# Patient Record
Sex: MALE | Race: WHITE | HISPANIC OR LATINO | Employment: OTHER | URBAN - METROPOLITAN AREA
[De-identification: names, ages, dates, MRNs, and addresses within clinical notes are randomized per-mention and may not be internally consistent; named-entity substitution may affect disease eponyms.]

---

## 2019-11-15 DIAGNOSIS — I63.50 CEREBRAL ARTERY OCCLUSION WITH CEREBRAL INFARCTION: ICD-10-CM

## 2019-11-15 DIAGNOSIS — D68.52 HETEROZYGOUS FOR PROTHROMBIN G20210A MUTATION: ICD-10-CM

## 2019-11-15 DIAGNOSIS — R13.19 OTHER DYSPHAGIA: Primary | ICD-10-CM

## 2019-11-15 DIAGNOSIS — R13.19 CONSTANT LOW-GRADE DYSPHAGIA: Primary | ICD-10-CM

## 2019-12-11 ENCOUNTER — HOSPITAL ENCOUNTER (OUTPATIENT)
Dept: RADIOLOGY | Facility: HOSPITAL | Age: 69
Discharge: HOME | End: 2019-12-11
Attending: NURSE PRACTITIONER
Payer: MEDICARE

## 2019-12-11 DIAGNOSIS — I63.50 CEREBRAL ARTERY OCCLUSION WITH CEREBRAL INFARCTION: ICD-10-CM

## 2019-12-11 DIAGNOSIS — R13.19 CONSTANT LOW-GRADE DYSPHAGIA: ICD-10-CM

## 2019-12-11 PROCEDURE — 74230 X-RAY XM SWLNG FUNCJ C+: CPT | Mod: TC

## 2019-12-12 ENCOUNTER — HOSPITAL ENCOUNTER (EMERGENCY)
Facility: HOSPITAL | Age: 69
Discharge: NURSING, REHAB OR LONG-TERM CARE | End: 2019-12-13
Attending: EMERGENCY MEDICINE
Payer: MEDICARE

## 2019-12-12 DIAGNOSIS — S09.90XA INJURY OF HEAD, INITIAL ENCOUNTER: ICD-10-CM

## 2019-12-12 DIAGNOSIS — S00.83XA CONTUSION OF FOREHEAD, INITIAL ENCOUNTER: Primary | ICD-10-CM

## 2019-12-12 PROCEDURE — 99284 EMERGENCY DEPT VISIT MOD MDM: CPT

## 2019-12-13 VITALS
OXYGEN SATURATION: 88 % | WEIGHT: 150 LBS | DIASTOLIC BLOOD PRESSURE: 75 MMHG | RESPIRATION RATE: 20 BRPM | TEMPERATURE: 98 F | HEART RATE: 97 BPM | HEIGHT: 60 IN | SYSTOLIC BLOOD PRESSURE: 173 MMHG | BODY MASS INDEX: 29.45 KG/M2

## 2019-12-13 NOTE — ED PROVIDER NOTES
Encounter Date: 12/12/2019       History     Chief Complaint   Patient presents with    Head Injury     Chief complaint is fall HPI this patient was noted to have fallen at the nursing home.  Does have a history of stroke.  He is basically nonverbal.  He moves his legs and his arms fairly well. He has had a history of stroke and hypertension.  He is at his baseline per EMS report.  Blood sugar normal. Patient with minor superficial laceration right eyebrow.  No obvious complaints of pain on examination and palpation.  He cannot answer questions.        Review of patient's allergies indicates:  No Known Allergies  No past medical history on file.  No past surgical history on file.  No family history on file.  Social History     Tobacco Use    Smoking status: Not on file   Substance Use Topics    Alcohol use: Not on file    Drug use: Not on file     Review of Systems   Unable to perform ROS: Patient nonverbal       Physical Exam     Initial Vitals [12/12/19 2244]   BP Pulse Resp Temp SpO2   126/62 70 20 98.2 °F (36.8 °C) 97 %      MAP       --         Physical Exam    Nursing note and vitals reviewed.  Constitutional: He appears well-developed and well-nourished.   Small superficial laceration right eyebrow   HENT:   Head: Normocephalic and atraumatic.   Eyes: Conjunctivae, EOM and lids are normal. Pupils are equal, round, and reactive to light.   Neck: Trachea normal. Neck supple. No thyroid mass present.   Cardiovascular: Normal rate, regular rhythm and normal heart sounds.   Pulmonary/Chest: Breath sounds normal. No respiratory distress.   Abdominal: Soft. Bowel sounds are normal. There is no tenderness.   Musculoskeletal:   Patient does move both lower extremities. He does have  in both arms.   Neurological: He is alert. He has normal strength. No cranial nerve deficit or sensory deficit.   Patient unable answer questions.   Skin: Skin is warm and dry.   Psychiatric: He has a normal mood and affect. His  speech is normal and behavior is normal. Judgment and thought content normal.         ED Course   Procedures  Labs Reviewed - No data to display       Imaging Results    None                       Attending Attestation:             Attending ED Notes:   CT scans negative will discharge home no laceration repair needed.                        Clinical Impression:       ICD-10-CM ICD-9-CM   1. Contusion of forehead, initial encounter S00.83XA 920   2. Injury of head, initial encounter S09.90XA 959.01                             Mynor Hanna MD  12/13/19 0404

## 2019-12-13 NOTE — ED NOTES
CLEANED SMALL LACERATION ABOVE RIGHT EYE IN BROW LINE WITH NS.  APPLIED BACITRACIN OINTMENT AND BAND-AID.  PT TOLERATED WELL.

## 2020-03-19 ENCOUNTER — HOSPITAL ENCOUNTER (INPATIENT)
Facility: HOSPITAL | Age: 70
LOS: 18 days | Discharge: HOSPICE | DRG: 208 | End: 2020-04-07
Attending: EMERGENCY MEDICINE | Admitting: INTERNAL MEDICINE
Payer: MEDICARE

## 2020-03-19 DIAGNOSIS — R00.0 SINUS TACHYCARDIA: ICD-10-CM

## 2020-03-19 DIAGNOSIS — R06.02 SHORTNESS OF BREATH: ICD-10-CM

## 2020-03-19 DIAGNOSIS — J96.01 ACUTE HYPOXEMIC RESPIRATORY FAILURE: ICD-10-CM

## 2020-03-19 DIAGNOSIS — J96.91 RESPIRATORY FAILURE WITH HYPOXIA: ICD-10-CM

## 2020-03-19 DIAGNOSIS — R94.31 ABNORMAL EKG: ICD-10-CM

## 2020-03-19 DIAGNOSIS — Z20.822 SUSPECTED COVID-19 VIRUS INFECTION: ICD-10-CM

## 2020-03-19 DIAGNOSIS — J69.0 ASPIRATION PNEUMONIA OF RIGHT UPPER LOBE, UNSPECIFIED ASPIRATION PNEUMONIA TYPE: ICD-10-CM

## 2020-03-19 DIAGNOSIS — J18.9 PNEUMONIA OF BOTH LUNGS DUE TO INFECTIOUS ORGANISM: ICD-10-CM

## 2020-03-19 DIAGNOSIS — J18.9 PNEUMONIA OF BOTH LUNGS DUE TO INFECTIOUS ORGANISM, UNSPECIFIED PART OF LUNG: Primary | ICD-10-CM

## 2020-03-19 PROBLEM — E11.9 TYPE 2 DIABETES MELLITUS: Status: ACTIVE | Noted: 2020-03-19

## 2020-03-19 PROBLEM — R50.9 FEVER: Status: ACTIVE | Noted: 2020-03-19

## 2020-03-19 PROBLEM — R06.82 TACHYPNEA: Status: ACTIVE | Noted: 2020-03-19

## 2020-03-19 PROBLEM — I10 HTN (HYPERTENSION): Status: ACTIVE | Noted: 2020-03-19

## 2020-03-19 LAB
ALBUMIN SERPL BCP-MCNC: 3.9 G/DL (ref 3.5–5.2)
ALLENS TEST: ABNORMAL
ALP SERPL-CCNC: 70 U/L (ref 55–135)
ALT SERPL W/O P-5'-P-CCNC: 16 U/L (ref 10–44)
ANION GAP SERPL CALC-SCNC: 11 MMOL/L (ref 8–16)
APTT PPP: 33.1 SEC (ref 23.6–33.3)
AST SERPL-CCNC: 24 U/L (ref 10–40)
BASOPHILS # BLD AUTO: 0 K/UL (ref 0–0.2)
BASOPHILS NFR BLD: 0 % (ref 0–1.9)
BILIRUB SERPL-MCNC: 0.6 MG/DL (ref 0.1–1)
BILIRUB UR QL STRIP: NEGATIVE
BNP SERPL-MCNC: 13 PG/ML (ref 0–99)
BUN SERPL-MCNC: 22 MG/DL (ref 8–23)
CALCIUM SERPL-MCNC: 9.1 MG/DL (ref 8.7–10.5)
CHLORIDE SERPL-SCNC: 104 MMOL/L (ref 95–110)
CLARITY UR: CLEAR
CO2 SERPL-SCNC: 24 MMOL/L (ref 23–29)
COLOR UR: YELLOW
CREAT SERPL-MCNC: 0.7 MG/DL (ref 0.5–1.4)
DELSYS: ABNORMAL
DEPRECATED S PYO AG THROAT QL EIA: NEGATIVE
DIFFERENTIAL METHOD: ABNORMAL
EOSINOPHIL # BLD AUTO: 0 K/UL (ref 0–0.5)
EOSINOPHIL NFR BLD: 0 % (ref 0–8)
ERYTHROCYTE [DISTWIDTH] IN BLOOD BY AUTOMATED COUNT: 12.2 % (ref 11.5–14.5)
EST. GFR  (AFRICAN AMERICAN): >60 ML/MIN/1.73 M^2
EST. GFR  (NON AFRICAN AMERICAN): >60 ML/MIN/1.73 M^2
FLOW: 3
GLUCOSE SERPL-MCNC: 105 MG/DL (ref 70–110)
GLUCOSE SERPL-MCNC: 95 MG/DL (ref 70–110)
GLUCOSE UR QL STRIP: NEGATIVE
HCO3 UR-SCNC: 21.2 MMOL/L (ref 24–28)
HCT VFR BLD AUTO: 34.9 % (ref 40–54)
HCT VFR BLD CALC: 33 %PCV (ref 36–54)
HGB BLD-MCNC: 11.7 G/DL (ref 14–18)
HGB UR QL STRIP: NEGATIVE
IMM GRANULOCYTES # BLD AUTO: 0.01 K/UL (ref 0–0.04)
IMM GRANULOCYTES NFR BLD AUTO: 0.2 % (ref 0–0.5)
INFLUENZA A, MOLECULAR: NEGATIVE
INFLUENZA B, MOLECULAR: NEGATIVE
INR PPP: 1.3
KETONES UR QL STRIP: ABNORMAL
LACTATE SERPL-SCNC: 1.1 MMOL/L (ref 0.5–1.9)
LEUKOCYTE ESTERASE UR QL STRIP: NEGATIVE
LIPASE SERPL-CCNC: 35 U/L (ref 4–60)
LYMPHOCYTES # BLD AUTO: 1.3 K/UL (ref 1–4.8)
LYMPHOCYTES NFR BLD: 27.1 % (ref 18–48)
MAGNESIUM SERPL-MCNC: 2.1 MG/DL (ref 1.6–2.6)
MCH RBC QN AUTO: 32.1 PG (ref 27–31)
MCHC RBC AUTO-ENTMCNC: 33.5 G/DL (ref 32–36)
MCV RBC AUTO: 96 FL (ref 82–98)
MODE: ABNORMAL
MONOCYTES # BLD AUTO: 0.3 K/UL (ref 0.3–1)
MONOCYTES NFR BLD: 7.1 % (ref 4–15)
NEUTROPHILS # BLD AUTO: 3 K/UL (ref 1.8–7.7)
NEUTROPHILS NFR BLD: 65.6 % (ref 38–73)
NITRITE UR QL STRIP: NEGATIVE
NRBC BLD-RTO: 0 /100 WBC
PCO2 BLDA: 32.7 MMHG (ref 35–45)
PH SMN: 7.42 [PH] (ref 7.35–7.45)
PH UR STRIP: 6 [PH] (ref 5–8)
PHOSPHATE SERPL-MCNC: 3.3 MG/DL (ref 2.7–4.5)
PLATELET # BLD AUTO: 153 K/UL (ref 150–350)
PMV BLD AUTO: 10.9 FL (ref 9.2–12.9)
PO2 BLDA: 87 MMHG (ref 80–100)
POC BE: -3 MMOL/L
POC IONIZED CALCIUM: 1.1 MMOL/L (ref 1.06–1.42)
POC SATURATED O2: 97 % (ref 95–100)
POC TCO2: 22 MMOL/L (ref 23–27)
POTASSIUM BLD-SCNC: 4 MMOL/L (ref 3.5–5.1)
POTASSIUM SERPL-SCNC: 4.1 MMOL/L (ref 3.5–5.1)
PROCALCITONIN SERPL IA-MCNC: <0.05 NG/ML (ref 0–0.5)
PROT SERPL-MCNC: 7.3 G/DL (ref 6–8.4)
PROT UR QL STRIP: ABNORMAL
PROTHROMBIN TIME: 16 SEC (ref 10.6–14.8)
RBC # BLD AUTO: 3.65 M/UL (ref 4.6–6.2)
SAMPLE: ABNORMAL
SITE: ABNORMAL
SODIUM BLD-SCNC: 142 MMOL/L (ref 136–145)
SODIUM SERPL-SCNC: 139 MMOL/L (ref 136–145)
SP GR UR STRIP: 1.03 (ref 1–1.03)
SPECIMEN SOURCE: NORMAL
TROPONIN I SERPL DL<=0.01 NG/ML-MCNC: <0.03 NG/ML
URN SPEC COLLECT METH UR: ABNORMAL
UROBILINOGEN UR STRIP-ACNC: NEGATIVE EU/DL
WBC # BLD AUTO: 4.62 K/UL (ref 3.9–12.7)

## 2020-03-19 PROCEDURE — 63600175 PHARM REV CODE 636 W HCPCS: Performed by: EMERGENCY MEDICINE

## 2020-03-19 PROCEDURE — 87502 INFLUENZA DNA AMP PROBE: CPT

## 2020-03-19 PROCEDURE — 85014 HEMATOCRIT: CPT

## 2020-03-19 PROCEDURE — 94761 N-INVAS EAR/PLS OXIMETRY MLT: CPT

## 2020-03-19 PROCEDURE — 25000003 PHARM REV CODE 250: Performed by: EMERGENCY MEDICINE

## 2020-03-19 PROCEDURE — 84145 PROCALCITONIN (PCT): CPT

## 2020-03-19 PROCEDURE — 87081 CULTURE SCREEN ONLY: CPT

## 2020-03-19 PROCEDURE — S0077 INJECTION, CLINDAMYCIN PHOSP: HCPCS | Performed by: EMERGENCY MEDICINE

## 2020-03-19 PROCEDURE — G0378 HOSPITAL OBSERVATION PER HR: HCPCS

## 2020-03-19 PROCEDURE — 96365 THER/PROPH/DIAG IV INF INIT: CPT | Mod: 59

## 2020-03-19 PROCEDURE — 81003 URINALYSIS AUTO W/O SCOPE: CPT

## 2020-03-19 PROCEDURE — 83735 ASSAY OF MAGNESIUM: CPT

## 2020-03-19 PROCEDURE — 36600 WITHDRAWAL OF ARTERIAL BLOOD: CPT

## 2020-03-19 PROCEDURE — 85730 THROMBOPLASTIN TIME PARTIAL: CPT

## 2020-03-19 PROCEDURE — 84132 ASSAY OF SERUM POTASSIUM: CPT

## 2020-03-19 PROCEDURE — 93005 ELECTROCARDIOGRAM TRACING: CPT | Performed by: INTERNAL MEDICINE

## 2020-03-19 PROCEDURE — 84295 ASSAY OF SERUM SODIUM: CPT

## 2020-03-19 PROCEDURE — 87880 STREP A ASSAY W/OPTIC: CPT

## 2020-03-19 PROCEDURE — 96375 TX/PRO/DX INJ NEW DRUG ADDON: CPT

## 2020-03-19 PROCEDURE — 85610 PROTHROMBIN TIME: CPT

## 2020-03-19 PROCEDURE — 83605 ASSAY OF LACTIC ACID: CPT

## 2020-03-19 PROCEDURE — 99900035 HC TECH TIME PER 15 MIN (STAT)

## 2020-03-19 PROCEDURE — 82330 ASSAY OF CALCIUM: CPT

## 2020-03-19 PROCEDURE — 96368 THER/DIAG CONCURRENT INF: CPT

## 2020-03-19 PROCEDURE — 87086 URINE CULTURE/COLONY COUNT: CPT

## 2020-03-19 PROCEDURE — 87040 BLOOD CULTURE FOR BACTERIA: CPT | Mod: 59

## 2020-03-19 PROCEDURE — 84100 ASSAY OF PHOSPHORUS: CPT

## 2020-03-19 PROCEDURE — 84484 ASSAY OF TROPONIN QUANT: CPT

## 2020-03-19 PROCEDURE — 83690 ASSAY OF LIPASE: CPT

## 2020-03-19 PROCEDURE — U0002 COVID-19 LAB TEST NON-CDC: HCPCS

## 2020-03-19 PROCEDURE — 83880 ASSAY OF NATRIURETIC PEPTIDE: CPT

## 2020-03-19 PROCEDURE — 96361 HYDRATE IV INFUSION ADD-ON: CPT

## 2020-03-19 PROCEDURE — 87641 MR-STAPH DNA AMP PROBE: CPT

## 2020-03-19 PROCEDURE — 82803 BLOOD GASES ANY COMBINATION: CPT

## 2020-03-19 PROCEDURE — 94640 AIRWAY INHALATION TREATMENT: CPT

## 2020-03-19 PROCEDURE — 85025 COMPLETE CBC W/AUTO DIFF WBC: CPT

## 2020-03-19 PROCEDURE — 27000221 HC OXYGEN, UP TO 24 HOURS

## 2020-03-19 PROCEDURE — 80053 COMPREHEN METABOLIC PANEL: CPT

## 2020-03-19 PROCEDURE — 51701 INSERT BLADDER CATHETER: CPT

## 2020-03-19 PROCEDURE — 99285 EMERGENCY DEPT VISIT HI MDM: CPT | Mod: 25

## 2020-03-19 PROCEDURE — 25000242 PHARM REV CODE 250 ALT 637 W/ HCPCS: Performed by: EMERGENCY MEDICINE

## 2020-03-19 RX ORDER — CHOLECALCIFEROL (VITAMIN D3) 25 MCG
1000 TABLET ORAL DAILY
COMMUNITY

## 2020-03-19 RX ORDER — MIRTAZAPINE 15 MG/1
15 TABLET, FILM COATED ORAL NIGHTLY
COMMUNITY

## 2020-03-19 RX ORDER — ASPIRIN 81 MG/1
81 TABLET ORAL DAILY
Status: ON HOLD | COMMUNITY
End: 2020-03-27 | Stop reason: HOSPADM

## 2020-03-19 RX ORDER — TRIAMCINOLONE ACETONIDE 0.25 MG/G
1 CREAM TOPICAL 2 TIMES DAILY
COMMUNITY

## 2020-03-19 RX ORDER — ACETAMINOPHEN 650 MG/1
650 SUPPOSITORY RECTAL
Status: COMPLETED | OUTPATIENT
Start: 2020-03-19 | End: 2020-03-19

## 2020-03-19 RX ORDER — CARBIDOPA AND LEVODOPA 50; 200 MG/1; MG/1
2 TABLET, EXTENDED RELEASE ORAL 3 TIMES DAILY
COMMUNITY

## 2020-03-19 RX ORDER — LISINOPRIL 10 MG/1
10 TABLET ORAL NIGHTLY
COMMUNITY

## 2020-03-19 RX ORDER — KETOCONAZOLE 20 MG/ML
1 SHAMPOO, SUSPENSION TOPICAL
COMMUNITY

## 2020-03-19 RX ORDER — CLINDAMYCIN PHOSPHATE 600 MG/50ML
600 INJECTION, SOLUTION INTRAVENOUS
Status: COMPLETED | OUTPATIENT
Start: 2020-03-19 | End: 2020-03-20

## 2020-03-19 RX ORDER — CARBIDOPA AND LEVODOPA 25; 100 MG/1; MG/1
1 TABLET ORAL 3 TIMES DAILY
Status: ON HOLD | COMMUNITY
End: 2020-03-27 | Stop reason: HOSPADM

## 2020-03-19 RX ORDER — MULTIVITAMIN
1 TABLET ORAL DAILY
COMMUNITY

## 2020-03-19 RX ORDER — DEXTROMETHORPHAN HYDROBROMIDE, GUAIFENESIN 5; 100 MG/5ML; MG/5ML
650 LIQUID ORAL EVERY 6 HOURS PRN
COMMUNITY

## 2020-03-19 RX ORDER — IPRATROPIUM BROMIDE AND ALBUTEROL SULFATE 2.5; .5 MG/3ML; MG/3ML
3 SOLUTION RESPIRATORY (INHALATION)
Status: COMPLETED | OUTPATIENT
Start: 2020-03-19 | End: 2020-03-19

## 2020-03-19 RX ORDER — METHYLPREDNISOLONE SOD SUCC 125 MG
125 VIAL (EA) INJECTION
Status: COMPLETED | OUTPATIENT
Start: 2020-03-19 | End: 2020-03-19

## 2020-03-19 RX ORDER — METFORMIN HYDROCHLORIDE 500 MG/1
500 TABLET ORAL
COMMUNITY
End: 2020-03-19 | Stop reason: CLARIF

## 2020-03-19 RX ADMIN — IPRATROPIUM BROMIDE AND ALBUTEROL SULFATE 3 ML: .5; 3 SOLUTION RESPIRATORY (INHALATION) at 10:03

## 2020-03-19 RX ADMIN — AZITHROMYCIN MONOHYDRATE 500 MG: 500 INJECTION, POWDER, LYOPHILIZED, FOR SOLUTION INTRAVENOUS at 09:03

## 2020-03-19 RX ADMIN — CLINDAMYCIN IN 5 PERCENT DEXTROSE 600 MG: 12 INJECTION, SOLUTION INTRAVENOUS at 10:03

## 2020-03-19 RX ADMIN — CEFTRIAXONE SODIUM 2 G: 2 INJECTION, SOLUTION INTRAVENOUS at 09:03

## 2020-03-19 RX ADMIN — METHYLPREDNISOLONE SODIUM SUCCINATE 125 MG: 125 INJECTION, POWDER, FOR SOLUTION INTRAMUSCULAR; INTRAVENOUS at 10:03

## 2020-03-19 RX ADMIN — SODIUM CHLORIDE 2109 ML: 9 INJECTION, SOLUTION INTRAVENOUS at 07:03

## 2020-03-19 RX ADMIN — ACETAMINOPHEN 650 MG: 650 SUPPOSITORY RECTAL at 07:03

## 2020-03-20 ENCOUNTER — CLINICAL SUPPORT (OUTPATIENT)
Dept: CARDIOLOGY | Facility: HOSPITAL | Age: 70
DRG: 208 | End: 2020-03-20
Attending: EMERGENCY MEDICINE
Payer: MEDICARE

## 2020-03-20 VITALS — WEIGHT: 152.31 LBS | BODY MASS INDEX: 25.38 KG/M2 | HEIGHT: 65 IN

## 2020-03-20 PROBLEM — R13.13 PHARYNGEAL DYSPHAGIA: Status: ACTIVE | Noted: 2020-03-20

## 2020-03-20 PROBLEM — J18.9 PNEUMONIA OF BOTH LUNGS DUE TO INFECTIOUS ORGANISM: Status: ACTIVE | Noted: 2020-03-20

## 2020-03-20 PROBLEM — I63.9 CVA (CEREBRAL VASCULAR ACCIDENT): Chronic | Status: ACTIVE | Noted: 2020-03-20

## 2020-03-20 PROBLEM — R26.81 GAIT INSTABILITY: Status: ACTIVE | Noted: 2020-03-20

## 2020-03-20 LAB
ANION GAP SERPL CALC-SCNC: 5 MMOL/L (ref 8–16)
AORTIC ROOT ANNULUS: 3.2 CM
AORTIC VALVE CUSP SEPERATION: 2.05 CM
AV INDEX (PROSTH): 0.73
AV MEAN GRADIENT: 3 MMHG
AV PEAK GRADIENT: 5 MMHG
AV VALVE AREA: 2.7 CM2
AV VELOCITY RATIO: 72.66
BASOPHILS # BLD AUTO: 0 K/UL (ref 0–0.2)
BASOPHILS NFR BLD: 0 % (ref 0–1.9)
BSA FOR ECHO PROCEDURE: 1.78 M2
BUN SERPL-MCNC: 16 MG/DL (ref 8–23)
CALCIUM SERPL-MCNC: 8.3 MG/DL (ref 8.7–10.5)
CHLORIDE SERPL-SCNC: 113 MMOL/L (ref 95–110)
CO2 SERPL-SCNC: 24 MMOL/L (ref 23–29)
CREAT SERPL-MCNC: 0.6 MG/DL (ref 0.5–1.4)
CV ECHO LV RWT: 0.39 CM
DIFFERENTIAL METHOD: ABNORMAL
DOP CALC AO PEAK VEL: 1.09 M/S
DOP CALC AO VTI: 24.8 CM
DOP CALC LVOT AREA: 3.7 CM2
DOP CALC LVOT DIAMETER: 2.17 CM
DOP CALC LVOT PEAK VEL: 79.2 M/S
DOP CALC LVOT STROKE VOLUME: 66.91 CM3
DOP CALCLVOT PEAK VEL VTI: 18.1 CM
E WAVE DECELERATION TIME: 223.77 MSEC
E/A RATIO: 1.71
E/E' RATIO: 6.96 M/S
ECHO LV POSTERIOR WALL: 0.93 CM (ref 0.6–1.1)
EOSINOPHIL # BLD AUTO: 0 K/UL (ref 0–0.5)
EOSINOPHIL NFR BLD: 0 % (ref 0–8)
ERYTHROCYTE [DISTWIDTH] IN BLOOD BY AUTOMATED COUNT: 12.2 % (ref 11.5–14.5)
EST. GFR  (AFRICAN AMERICAN): >60 ML/MIN/1.73 M^2
EST. GFR  (NON AFRICAN AMERICAN): >60 ML/MIN/1.73 M^2
FRACTIONAL SHORTENING: 31 % (ref 28–44)
GLUCOSE SERPL-MCNC: 128 MG/DL (ref 70–110)
GLUCOSE SERPL-MCNC: 152 MG/DL (ref 70–110)
HCT VFR BLD AUTO: 33.2 % (ref 40–54)
HGB BLD-MCNC: 10.9 G/DL (ref 14–18)
IMM GRANULOCYTES # BLD AUTO: 0.01 K/UL (ref 0–0.04)
IMM GRANULOCYTES NFR BLD AUTO: 0.3 % (ref 0–0.5)
INTERVENTRICULAR SEPTUM: 0.93 CM (ref 0.6–1.1)
IVRT: 58.01 MSEC
LACTATE SERPL-SCNC: 0.9 MMOL/L (ref 0.5–1.9)
LEFT ATRIUM SIZE: 2.8 CM
LEFT INTERNAL DIMENSION IN SYSTOLE: 3.28 CM (ref 2.1–4)
LEFT VENTRICLE MASS INDEX: 86 G/M2
LEFT VENTRICULAR INTERNAL DIMENSION IN DIASTOLE: 4.76 CM (ref 3.5–6)
LEFT VENTRICULAR MASS: 152.25 G
LV LATERAL E/E' RATIO: 5.8 M/S
LV SEPTAL E/E' RATIO: 8.7 M/S
LYMPHOCYTES # BLD AUTO: 0.6 K/UL (ref 1–4.8)
LYMPHOCYTES NFR BLD: 19.9 % (ref 18–48)
MAGNESIUM SERPL-MCNC: 2.1 MG/DL (ref 1.6–2.6)
MCH RBC QN AUTO: 31.8 PG (ref 27–31)
MCHC RBC AUTO-ENTMCNC: 32.8 G/DL (ref 32–36)
MCV RBC AUTO: 97 FL (ref 82–98)
MONOCYTES # BLD AUTO: 0.1 K/UL (ref 0.3–1)
MONOCYTES NFR BLD: 2.6 % (ref 4–15)
MRSA SCREEN BY PCR: NEGATIVE
MV PEAK A VEL: 0.51 M/S
MV PEAK E VEL: 0.87 M/S
NEUTROPHILS # BLD AUTO: 2.3 K/UL (ref 1.8–7.7)
NEUTROPHILS NFR BLD: 77.2 % (ref 38–73)
NRBC BLD-RTO: 0 /100 WBC
PHOSPHATE SERPL-MCNC: 3.8 MG/DL (ref 2.7–4.5)
PISA TR MAX VEL: 2.07 M/S
PLATELET # BLD AUTO: 132 K/UL (ref 150–350)
PMV BLD AUTO: 10.7 FL (ref 9.2–12.9)
POTASSIUM SERPL-SCNC: 3.6 MMOL/L (ref 3.5–5.1)
PV PEAK VELOCITY: 63.56 CM/S
RA PRESSURE: 3 MMHG
RBC # BLD AUTO: 3.43 M/UL (ref 4.6–6.2)
RIGHT VENTRICULAR END-DIASTOLIC DIMENSION: 203 CM
SODIUM SERPL-SCNC: 142 MMOL/L (ref 136–145)
TDI LATERAL: 0.15 M/S
TDI SEPTAL: 0.1 M/S
TDI: 0.13 M/S
TR MAX PG: 17 MMHG
TV REST PULMONARY ARTERY PRESSURE: 20 MMHG
WBC # BLD AUTO: 3.02 K/UL (ref 3.9–12.7)

## 2020-03-20 PROCEDURE — 63600175 PHARM REV CODE 636 W HCPCS: Performed by: NURSE PRACTITIONER

## 2020-03-20 PROCEDURE — 25500020 PHARM REV CODE 255: Performed by: INTERNAL MEDICINE

## 2020-03-20 PROCEDURE — 12000002 HC ACUTE/MED SURGE SEMI-PRIVATE ROOM

## 2020-03-20 PROCEDURE — 83605 ASSAY OF LACTIC ACID: CPT

## 2020-03-20 PROCEDURE — 94640 AIRWAY INHALATION TREATMENT: CPT

## 2020-03-20 PROCEDURE — 80048 BASIC METABOLIC PNL TOTAL CA: CPT

## 2020-03-20 PROCEDURE — 83735 ASSAY OF MAGNESIUM: CPT

## 2020-03-20 PROCEDURE — 82962 GLUCOSE BLOOD TEST: CPT

## 2020-03-20 PROCEDURE — 27000221 HC OXYGEN, UP TO 24 HOURS

## 2020-03-20 PROCEDURE — 85025 COMPLETE CBC W/AUTO DIFF WBC: CPT

## 2020-03-20 PROCEDURE — 36415 COLL VENOUS BLD VENIPUNCTURE: CPT

## 2020-03-20 PROCEDURE — 25000242 PHARM REV CODE 250 ALT 637 W/ HCPCS: Performed by: NURSE PRACTITIONER

## 2020-03-20 PROCEDURE — 25000003 PHARM REV CODE 250: Performed by: INTERNAL MEDICINE

## 2020-03-20 PROCEDURE — 99900035 HC TECH TIME PER 15 MIN (STAT)

## 2020-03-20 PROCEDURE — 94761 N-INVAS EAR/PLS OXIMETRY MLT: CPT

## 2020-03-20 PROCEDURE — 84100 ASSAY OF PHOSPHORUS: CPT

## 2020-03-20 PROCEDURE — 63600175 PHARM REV CODE 636 W HCPCS: Performed by: INTERNAL MEDICINE

## 2020-03-20 PROCEDURE — 93306 TTE W/DOPPLER COMPLETE: CPT

## 2020-03-20 RX ORDER — SODIUM CHLORIDE 9 MG/ML
INJECTION, SOLUTION INTRAVENOUS CONTINUOUS
Status: DISCONTINUED | OUTPATIENT
Start: 2020-03-20 | End: 2020-03-20

## 2020-03-20 RX ORDER — ENOXAPARIN SODIUM 100 MG/ML
40 INJECTION SUBCUTANEOUS
Status: DISCONTINUED | OUTPATIENT
Start: 2020-03-20 | End: 2020-03-29

## 2020-03-20 RX ORDER — IPRATROPIUM BROMIDE AND ALBUTEROL SULFATE 2.5; .5 MG/3ML; MG/3ML
3 SOLUTION RESPIRATORY (INHALATION) EVERY 6 HOURS
Status: DISCONTINUED | OUTPATIENT
Start: 2020-03-20 | End: 2020-03-22

## 2020-03-20 RX ORDER — HYDROXYCHLOROQUINE SULFATE 200 MG/1
200 TABLET, FILM COATED ORAL 2 TIMES DAILY
Status: DISCONTINUED | OUTPATIENT
Start: 2020-03-20 | End: 2020-03-20

## 2020-03-20 RX ORDER — CARBIDOPA AND LEVODOPA 25; 100 MG/1; MG/1
1 TABLET ORAL 3 TIMES DAILY
Status: DISCONTINUED | OUTPATIENT
Start: 2020-03-20 | End: 2020-03-26

## 2020-03-20 RX ORDER — SODIUM CHLORIDE 9 MG/ML
INJECTION, SOLUTION INTRAVENOUS CONTINUOUS
Status: DISCONTINUED | OUTPATIENT
Start: 2020-03-20 | End: 2020-03-25

## 2020-03-20 RX ORDER — LISINOPRIL 10 MG/1
10 TABLET ORAL NIGHTLY
Status: DISCONTINUED | OUTPATIENT
Start: 2020-03-20 | End: 2020-03-26

## 2020-03-20 RX ORDER — HYDROXYCHLOROQUINE SULFATE 200 MG/1
400 TABLET, FILM COATED ORAL 2 TIMES DAILY
Status: COMPLETED | OUTPATIENT
Start: 2020-03-20 | End: 2020-03-20

## 2020-03-20 RX ORDER — MIRTAZAPINE 15 MG/1
15 TABLET, FILM COATED ORAL NIGHTLY
Status: DISCONTINUED | OUTPATIENT
Start: 2020-03-20 | End: 2020-03-20

## 2020-03-20 RX ORDER — ACETAMINOPHEN 650 MG/1
650 SUPPOSITORY RECTAL EVERY 6 HOURS PRN
Status: DISCONTINUED | OUTPATIENT
Start: 2020-03-20 | End: 2020-03-29

## 2020-03-20 RX ORDER — SODIUM CHLORIDE 0.9 % (FLUSH) 0.9 %
10 SYRINGE (ML) INJECTION
Status: DISCONTINUED | OUTPATIENT
Start: 2020-03-20 | End: 2020-03-29

## 2020-03-20 RX ORDER — ACETAMINOPHEN 325 MG/1
650 TABLET ORAL EVERY 6 HOURS PRN
Status: DISCONTINUED | OUTPATIENT
Start: 2020-03-20 | End: 2020-03-29

## 2020-03-20 RX ORDER — HYDROXYCHLOROQUINE SULFATE 200 MG/1
200 TABLET, FILM COATED ORAL 2 TIMES DAILY
Status: DISCONTINUED | OUTPATIENT
Start: 2020-03-21 | End: 2020-03-25

## 2020-03-20 RX ORDER — ASPIRIN 81 MG/1
81 TABLET ORAL DAILY
Status: DISCONTINUED | OUTPATIENT
Start: 2020-03-20 | End: 2020-03-22

## 2020-03-20 RX ADMIN — ASPIRIN 81 MG: 81 TABLET, DELAYED RELEASE ORAL at 04:03

## 2020-03-20 RX ADMIN — CARBIDOPA AND LEVODOPA 1 TABLET: 25; 100 TABLET ORAL at 09:03

## 2020-03-20 RX ADMIN — IOHEXOL 100 ML: 350 INJECTION, SOLUTION INTRAVENOUS at 09:03

## 2020-03-20 RX ADMIN — IPRATROPIUM BROMIDE AND ALBUTEROL SULFATE 3 ML: .5; 3 SOLUTION RESPIRATORY (INHALATION) at 02:03

## 2020-03-20 RX ADMIN — SODIUM CHLORIDE: 0.9 INJECTION, SOLUTION INTRAVENOUS at 09:03

## 2020-03-20 RX ADMIN — AZITHROMYCIN MONOHYDRATE 500 MG: 500 INJECTION, POWDER, LYOPHILIZED, FOR SOLUTION INTRAVENOUS at 09:03

## 2020-03-20 RX ADMIN — IPRATROPIUM BROMIDE AND ALBUTEROL SULFATE 3 ML: .5; 3 SOLUTION RESPIRATORY (INHALATION) at 08:03

## 2020-03-20 RX ADMIN — ENOXAPARIN SODIUM 40 MG: 100 INJECTION SUBCUTANEOUS at 04:03

## 2020-03-20 RX ADMIN — IPRATROPIUM BROMIDE AND ALBUTEROL SULFATE 3 ML: .5; 3 SOLUTION RESPIRATORY (INHALATION) at 09:03

## 2020-03-20 RX ADMIN — HYDROXYCHLOROQUINE SULFATE 400 MG: 200 TABLET, FILM COATED ORAL at 02:03

## 2020-03-20 RX ADMIN — LISINOPRIL 10 MG: 10 TABLET ORAL at 09:03

## 2020-03-20 RX ADMIN — CARBIDOPA AND LEVODOPA 1 TABLET: 25; 100 TABLET ORAL at 04:03

## 2020-03-20 RX ADMIN — HYDROXYCHLOROQUINE SULFATE 400 MG: 200 TABLET, FILM COATED ORAL at 09:03

## 2020-03-20 RX ADMIN — CEFTRIAXONE 1 G: 1 INJECTION, SOLUTION INTRAVENOUS at 10:03

## 2020-03-20 RX ADMIN — SODIUM CHLORIDE: 0.9 INJECTION, SOLUTION INTRAVENOUS at 03:03

## 2020-03-20 NOTE — CONSULTS
"[facility]  Adult Nutrition   Consult Note (Initial Assessment)     SUMMARY     Recommendations/Interventions:    Recommendation/Intervention: 1. Advance diet when medically appropriate per MD. 2. RD to monitor # of days NPO.  Goals: 1. Diet to advance and patient to meet at least 75% of estimated needs via PO intake of meals and supplements.  Nutrition Goal Status: new    Dietitian Rounds Brief:  · Seen 2' consult RE: hx of stroke. Patient was sent from [facility] for possible aspiration pneumonia. Patient has fever and SOB. Patient is non-verbal (due to CVA and parkinson's disease) therefore unable to obtain nutrition related hx. Just recently admitted last night.  Will continue to monitor diet advancement, labs, and plan of care.  Reason for Assessment  Reason For Assessment: consult  Diagnosis: (aspiration pneumonia )  Relevant Medical History: DM, GERD, HTN, Stroke    Nutrition Risk Screen  Nutrition Risk Screen: dysphagia or difficulty swallowing     MST Score: 2  Have you recently lost weight without trying?: Unsure  Weight loss score: 2  Have you been eating poorly because of a decreased appetite?: No(Unable to assess)  Appetite score: 0       Nutrition/Diet History  Spiritual, Cultural Beliefs, Denominational Practices, Values that Affect Care: other (see comments)(pt non verbal)  Food Allergies: NKFA  Factors Affecting Nutritional Intake: NPO    Anthropometrics  Temp: 96.1 °F (35.6 °C)  Height Method: Estimated  Height: 5' 5" (165.1 cm)  Height (inches): 65 in  Weight Method: Bed Scale  Weight: 69.1 kg (152 lb 5.4 oz)  Weight (lb): 152.34 lb  Ideal Body Weight (IBW), Male: 136 lb  % Ideal Body Weight, Male (lb): 109.1 %  BMI (Calculated): 25.4  BMI Grade: 25 - 29.9 - overweight     Weight History:  Wt Readings from Last 10 Encounters:   03/20/20 69.1 kg (152 lb 5.4 oz)   03/20/20 69.1 kg (152 lb 5.4 oz)   12/12/19 68 kg (150 lb)   09/13/16 81.6 kg (180 lb)     Lab/Procedures/Meds: Pertinent " Labs Reviewed  Clinical Chemistry:  Recent Labs   Lab 03/19/20 1930 03/20/20  0416    142   K 4.1 3.6    113*   CO2 24 24    152*   BUN 22 16   CREATININE 0.7 0.6   CALCIUM 9.1 8.3*   PROT 7.3  --    ALBUMIN 3.9  --    BILITOT 0.6  --    ALKPHOS 70  --    AST 24  --    ALT 16  --    ANIONGAP 11 5*   ESTGFRAFRICA >60.0 >60.0   EGFRNONAA >60.0 >60.0   MG 2.1 2.1   PHOS 3.3 3.8   LIPASE 35  --      CBC:   Recent Labs   Lab 03/20/20 0416   WBC 3.02*   RBC 3.43*   HGB 10.9*   HCT 33.2*   *   MCV 97   MCH 31.8*   MCHC 32.8   Cardiac Profile:  Recent Labs   Lab 03/19/20 1930   BNP 13   TROPONINI <0.030     Medications: Pertinent Medications reviewed  Scheduled Meds:   albuterol-ipratropium  3 mL Nebulization Q6H    cefTRIAXone (ROCEPHIN) IVPB  1 g Intravenous Q24H    And    azithromycin  500 mg Intravenous Q24H     Continuous Infusions:  PRN Meds:.acetaminophen, sodium chloride 0.9%    Estimated/Assessed Needs    Weight Used For Calorie Calculations: 69.1 kg (152 lb 5.4 oz)  Energy Calorie Requirements (kcal): 8824-8567 kcals/day (25-30 kcals/kg)  Energy Need Method: Kcal/kg  Protein Requirements: 69-90 g/day (1.0-1.3 g/kg)  Weight Used For Protein Calculations: 69.1 kg (152 lb 5.4 oz)     Estimated Fluid Requirement Method: RDA Method    Nutrition Prescription Ordered    Current Diet Order: NPO    Evaluation of Received Nutrient/Fluid Intake    Energy Calories Required: not meeting needs  Protein Required: not meeting needs  Fluid Required: meeting needs  Tolerance: (NPO)  % Intake of Estimated Energy Needs: 0%  % Meal Intake: NPO    Intake/Output Summary (Last 24 hours) at 3/20/2020 1038  Last data filed at 3/20/2020 0610  Gross per 24 hour   Intake 2650 ml   Output 1500 ml   Net 1150 ml      Nutrition Risk    Level of Risk/Frequency of Follow-up: high   Monitor and Evaluation    Food and Nutrient Adminstration: diet order  Physical Activity and Function: factors affecting access to  physical activity, nutrition-related ADLs and IADLs  Anthropometric Measurements: weight, weight change  Biochemical Data, Medical Tests and Procedures: electrolyte and renal panel, lipid profile, gastrointestinal profile, glucose/endocrine profile, inflammatory profile  Nutrition-Focused Physical Findings: overall appearance     Nutrition Follow-Up    RD Follow-up?: Yes  Mindy Pink RD 03/20/2020 10:42 AM

## 2020-03-20 NOTE — ASSESSMENT & PLAN NOTE
Monitor oxygen levels  Oxygen to keep sats greater then 90%  SLP swallow assessment in am, NPO till then.  IV Azthromycin daily.  Consult to ID regarding Pneumonia vs COVID.

## 2020-03-20 NOTE — PROGRESS NOTES
[facility] Medicine  Progress Note    Patient Name: Mukesh Addison  MRN: [id number]  Patient Class: IP- Inpatient   Admission Date: 3/19/2020  Length of Stay: 0 days  Attending Physician: Elie Mcghee MD  Primary Care Provider: Primary Doctor No        Subjective:     Principal Problem:Pneumonia of both lungs due to infectious organism      Interval History: Admitted for pneumonia. Pt is non-verbal at baseline due to hx of CVA. Tmax overnight of 102.2F. Needing 2L of supplemental oxygen. Appears comfortable during my encounter. No cough.     Review of Systems   Unable to perform ROS: Patient nonverbal     Objective:     Vital Signs (Most Recent):  Temp: 98.4 °F (36.9 °C) (03/20/20 1121)  Pulse: 61 (03/20/20 1121)  Resp: 17 (03/20/20 1121)  BP: 105/60 (03/20/20 1121)  SpO2: 96 % (03/20/20 1121) Vital Signs (24h Range):  Temp:  [96.1 °F (35.6 °C)-102.2 °F (39 °C)] 98.4 °F (36.9 °C)  Pulse:  [58-95] 61  Resp:  [16-38] 17  SpO2:  [93 %-98 %] 96 %  BP: (105-135)/(60-73) 105/60     Weight: 69.1 kg (152 lb 5.4 oz)  Body mass index is 25.35 kg/m².    Intake/Output Summary (Last 24 hours) at 3/20/2020 1405  Last data filed at 3/20/2020 0610  Gross per 24 hour   Intake 2650 ml   Output 1500 ml   Net 1150 ml      Physical Exam   Constitutional: No distress.   Frail  male in no acute distress   HENT:   Head: Normocephalic and atraumatic.   Eyes: Conjunctivae are normal. No scleral icterus.   Neck: Neck supple. No thyromegaly present.   Cardiovascular: Normal rate, regular rhythm, normal heart sounds and intact distal pulses.   Pulmonary/Chest: Effort normal. No accessory muscle usage. No tachypnea. No respiratory distress. He has no wheezes.   Scattered coarse rhonchi (R>L)   Abdominal: Soft. Bowel sounds are normal. He exhibits no distension.   Musculoskeletal: He exhibits deformity. He exhibits no edema.   Mild contractures to upper and lower extremities   Neurological: He is alert. He exhibits  abnormal muscle tone. He displays no seizure activity. GCS eye subscore is 3. GCS verbal subscore is 1. GCS motor subscore is 6.   Following commands    Skin: Skin is warm and dry. Capillary refill takes less than 2 seconds. He is not diaphoretic.   Flushed appearance   Psychiatric: He has a normal mood and affect. He is slowed.       Significant Labs:   CBC:   Recent Labs   Lab 03/19/20 1930 03/19/20 2253 03/20/20  0416   WBC 4.62  --  3.02*   HGB 11.7*  --  10.9*   HCT 34.9* 33* 33.2*     --  132*     CMP:   Recent Labs   Lab 03/19/20 1930 03/20/20  0416    142   K 4.1 3.6    113*   CO2 24 24    152*   BUN 22 16   CREATININE 0.7 0.6   CALCIUM 9.1 8.3*   PROT 7.3  --    ALBUMIN 3.9  --    BILITOT 0.6  --    ALKPHOS 70  --    AST 24  --    ALT 16  --    ANIONGAP 11 5*   EGFRNONAA >60.0 >60.0     Cardiac Markers:   Recent Labs   Lab 03/19/20 1930   BNP 13     Magnesium:   Recent Labs   Lab 03/19/20 1930 03/20/20 0416   MG 2.1 2.1     Troponin:   Recent Labs   Lab 03/19/20 1930   TROPONINI <0.030       Significant Imaging: I have reviewed all pertinent imaging results/findings within the past 24 hours.     Procedure Component Value Units Date/Time   CTA Chest Non Coronary [[id number]] Resulted: 03/20/20 1000   Order Status: Completed Updated: 03/20/20 1003   Narrative:     EXAMINATION:  CTA CHEST NON CORONARY    CLINICAL HISTORY:  PE suspected;    TECHNIQUE:  CMS MANDATED QUALITY DATA - CT RADIATION - 436    All CT scans at this facility utilize dose modulation, iterative reconstruction, and/or weight based dosing when appropriate to reduce radiation dose to as low as reasonably achievable.    Maximum intensity projection coronal and sagittal reformations were created at a separate workstation and stored in the patients permanent medical record.    100  cc Omnipaque 350 was administered.    COMPARISON:  Chest radiograph 03/20/2020    FINDINGS:  Contrast bolus timing is adequate.  No  central pulmonary embolism is evident.  There is moderate respiratory motion which significantly degrades image quality and exam sensitivity regarding peripheral pulmonary arteries.  Within these limitations, no convincing segmental or subsegmental pulmonary arterial filling defect is evident.    There are patchy ground-glass and alveolar opacities within both lungs, upper lobe predominant.  No pleural fluid.  No pneumothorax.    Thoracic aorta is normal in caliber noting mild atherosclerotic calcification of the arch.  Coronary artery atherosclerotic calcification also noted.    Bone window images demonstrate no acute or aggressive osseous abnormality.    Images through the upper abdomen demonstrate cholelithiasis and are otherwise unremarkable.   Impression:       Negative for central pulmonary embolism.  Limited assessment of peripheral pulmonary arteries due to motion artifact.    Patchy opacities within both lungs consistent with multifocal pneumonia.    Atherosclerosis, cholelithiasis, and other incidental findings as above.      Electronically signed by: Cresencio Shaw MD  Date: 03/20/2020  Time: 10:00           Assessment/Plan:      Active Hospital Problems    Diagnosis  POA    *Pneumonia of both lungs due to infectious organism [J18.9]  Yes    Suspected Covid-19 Virus Infection [R68.89]  Yes     Priority: 2     Pharyngeal dysphagia [R13.13]  Yes    CVA (cerebral vascular accident) [I63.9]  Yes     Chronic    Gait instability [R26.81]  Yes    Aspiration pneumonia [J69.0]  Yes    HTN (hypertension) [I10]  Yes      Resolved Hospital Problems   No resolved problems to display.       Plan:  Supplemental oxygen; wean as tolerated  Discussed case with infectious diseases; appreciate input  Continue empiric antibiotic therapy with ceftriaxone and azithromycin; follow blood cultures   Start hydroxychloroquine for suspected COVID-19; PCR is currently pending  Reviewed CTA chest; no central PE, pattern appears  to be lobar (aspiration vs bacterial)  Appreciate recommendations from speech therapy  Recent MBSS with severe pharyngeal dysphagia  Mechanical soft diet as per speech therapy recommendation  Air bone and droplet isolation precautions for suspected COVID-19  Continue home medications for chronic medical conditions  QTc monitoring while on hydroxychloroquine. Will hold home mirtazapine for now        VTE Risk Mitigation (From admission, onward)         Ordered     enoxaparin injection 40 mg  Every 24 hours (non-standard times)      03/20/20 1439     IP VTE LOW RISK PATIENT  Once      03/20/20 0247     Place sequential compression device  Until discontinued      03/20/20 0247                      Elie Mcghee MD  Department of Hospital Medicine   [facility]

## 2020-03-20 NOTE — HPI
Mr Addison is a 68 yo CM pt who resides in [facility]. He was sent here from there for possible aspiration pneumonia, on xray R>L. They had noted that his temperature spiked at 103 degrees. On arrival here it was 102.2 and now 100.9. He is experiencing tachypnea ( respiratory rate 35 with no hypoxia) and is flushed in appearance. He is nonverbal and cannot provide any history. Has no family listed on his demographics. Pt has a history of a CVA and Parkinson's making him non verbal. He also has contractures to his upper and lower extremities. Respirations with some wheezing. Other history from past records GERD and HTN.

## 2020-03-20 NOTE — ED PROVIDER NOTES
Encounter Date: 3/19/2020       History     Chief Complaint   Patient presents with    Shortness of Breath    Fever     Patient unable to give history.  Per nursing home records patient sent here for temperature 103° associated with shortness of breath.  There was concern for aspiration pneumonia.  No other history of present illness available at this time.        Review of patient's allergies indicates:  No Known Allergies  Past Medical History:   Diagnosis Date    Diabetes mellitus     GERD (gastroesophageal reflux disease)     Hypertension     Stroke      History reviewed. No pertinent surgical history.  History reviewed. No pertinent family history.  Social History     Tobacco Use    Smoking status: Never Smoker    Smokeless tobacco: Never Used   Substance Use Topics    Alcohol use: Not on file    Drug use: Not on file     Review of Systems   Unable to perform ROS: Dementia       Physical Exam     Initial Vitals [03/19/20 1912]   BP Pulse Resp Temp SpO2   135/73 81 (!) 28 (!) 102.2 °F (39 °C) (!) 93 %      MAP       --         Physical Exam    Nursing note and vitals reviewed.  Constitutional: He is not diaphoretic. No distress.   HENT:   Head: Normocephalic and atraumatic.   Eyes: Conjunctivae and EOM are normal.   Neck: Normal range of motion. Neck supple.   Cardiovascular: Regular rhythm.   Pulmonary/Chest: Breath sounds normal.   Abdominal: Soft. There is no tenderness.   Musculoskeletal: Normal range of motion.   Skin: No rash noted.   No significant decubitus   Psychiatric:   Patient follows some simple commands but does not answer questions.  Essentially nonverbal.         ED Course   Procedures  Labs Reviewed   CBC W/ AUTO DIFFERENTIAL - Abnormal; Notable for the following components:       Result Value    RBC 3.65 (*)     Hemoglobin 11.7 (*)     Hematocrit 34.9 (*)     Mean Corpuscular Hemoglobin 32.1 (*)     All other components within normal limits   URINALYSIS - Abnormal; Notable for the  following components:    Protein, UA Trace (*)     Ketones, UA Trace (*)     All other components within normal limits   PROTIME-INR - Abnormal; Notable for the following components:    PT 16.0 (*)     All other components within normal limits   THROAT SCREEN, RAPID   CULTURE, BLOOD   CULTURE, BLOOD   CULTURE, URINE   CULTURE, STREP A,  THROAT   COMPREHENSIVE METABOLIC PANEL   LACTIC ACID, PLASMA   MAGNESIUM   PHOSPHORUS   APTT   B-TYPE NATRIURETIC PEPTIDE   LIPASE   TROPONIN I   INFLUENZA A AND B ANTIGEN    Narrative:     Specimen Source->Nasopharyngeal Swab   PROCALCITONIN   SARS-COV-2 (COVID-19) QUALITATIVE PCR          Imaging Results          X-Ray Chest AP Portable (Final result)  Result time 03/19/20 19:41:51    Final result by Gavino Lazo MD (03/19/20 19:41:51)                 Impression:      1. Faint bilateral pulmonary opacities suspicious for bilateral pulmonary infiltrates, right greater than left.  2. No other significant findings.      Electronically signed by: Gavino Lazo MD  Date:    03/19/2020  Time:    19:41             Narrative:    EXAMINATION:  XR CHEST AP PORTABLE    CLINICAL HISTORY:  Shortness of breath, fever    COMPARISON:  June 2014    FINDINGS:  Heart size is normal.  The aorta is elongated.  Faint bilateral pulmonary opacities are noted suspicious for bilateral pulmonary infiltrates, right greater than left.  There are no pleural effusions.  No acute osseous abnormalities are identified.                                 Medical Decision Making:   History:   Old Medical Records: I decided to obtain old medical records.  Clinical Tests:   Lab Tests: Reviewed  Radiological Study: Reviewed  Medical Tests: Reviewed  ED Management:  Patient presents with fever, shortness of breath and possible bilateral pneumonia.  Given group home environment and [place] 19 is a possibility.  Testing initiated.  Broad-spectrum antibiotics initiated for usual pathogens of pneumonia and aspiration.   Hospitalist consulted for admission.    Patient reexamined.  Patient is slightly more tachypneic with slight squeaky expiratory wheeze.  Will give DuoNeb.  Patient low likelihood for corona virus and benefits outweigh risk for corticosteroids.                                 Clinical Impression:       ICD-10-CM ICD-9-CM   1. Pneumonia of both lungs due to infectious organism, unspecified part of lung J18.9 483.8   2. Shortness of breath R06.02 786.05                                Kevin Singh MD  03/19/20 8417

## 2020-03-20 NOTE — ASSESSMENT & PLAN NOTE
Monitor pt's oxygenation and respiratory rate.  Oxygen PRN via NC  Duo nebs Q 6 H  Concern for PE in immobilized pt check CTA of chest.  ABG's reassuring

## 2020-03-20 NOTE — H&P
[facility]  History & Physical    Patient Name: Mukesh Addison  MRN: [id number]  Admission Date: 3/19/2020  Attending Physician: ECTOR Biggs  Primary Care Provider: Primary Doctor No         Patient information was obtained from nursing home, past medical records and ER records.     Subjective:     Principal Problem:Aspiration pneumonia    Chief Complaint:   Chief Complaint   Patient presents with    Shortness of Breath    Fever        HPI: Mr Addison is a 68 yo CM pt who resides in [facility]. He was sent here from there for possible aspiration pneumonia, on xray R>L. They had noted that his temperature spiked at 103 degrees. On arrival here it was 102.2 and now 100.9. He is experiencing tachypnea ( respiratory rate 35 with no hypoxia) and is flushed in appearance. He is nonverbal and cannot provide any history. Has no family listed on his demographics. Pt has a history of a CVA and Parkinson's making him non verbal. He also has contractures to his upper and lower extremities. Respirations with some wheezing. Other history from past records GERD and HTN.    Past Medical History:   Diagnosis Date    Diabetes mellitus     GERD (gastroesophageal reflux disease)     Hypertension     Stroke        History reviewed. No pertinent surgical history.    Review of patient's allergies indicates:  No Known Allergies    No current facility-administered medications on file prior to encounter.      Current Outpatient Medications on File Prior to Encounter   Medication Sig    acetaminophen (TYLENOL) 650 MG TbSR Take 650 mg by mouth every 6 (six) hours as needed.    aspirin (ECOTRIN) 81 MG EC tablet Take 81 mg by mouth once daily.    carbidopa-levodopa  mg (SINEMET)  mg per tablet Take 1 tablet by mouth 3 (three) times daily.    carbidopa-levodopa  mg (SINEMET CR)  mg TbSR Take 2 tablets by mouth 3 (three) times daily.    dext 70/polycarbophil/peg/NaCl  (ARTIFICIAL TEAR SOLUTION OPHT) Place 2 drops into both eyes 2 (two) times daily.    dextromethorphan-quinidine 20-10 mg (NUEDEXTA) 20-10 mg per capsule Take 1 capsule by mouth 2 (two) times daily.    ketoconazole (NIZORAL) 2 % shampoo Apply 1 application topically every Mon, Wed, Fri.    lisinopriL 10 MG tablet Take 10 mg by mouth every evening.    mirtazapine (REMERON) 15 MG tablet Take 15 mg by mouth every evening.    multivitamin (THERAGRAN) per tablet Take 1 tablet by mouth once daily.    soap (BABY SHAMPOO TOP) Apply 1 application topically 2 (two) times daily.    triamcinolone acetonide 0.025% (KENALOG) 0.025 % cream Apply 1 application topically 2 (two) times daily.    vitamin D (VITAMIN D3) 1000 units Tab Take 1,000 Units by mouth once daily.    [DISCONTINUED] metFORMIN (GLUCOPHAGE) 500 MG tablet Take 500 mg by mouth daily with breakfast.     Family History     None        Tobacco Use    Smoking status: Never Smoker    Smokeless tobacco: Never Used   Substance and Sexual Activity    Alcohol use: Not on file    Drug use: Not on file    Sexual activity: Not on file     Review of Systems   Unable to perform ROS: Patient nonverbal     Objective:     Vital Signs (Most Recent):  Temp: (!) 100.9 °F (38.3 °C) (03/19/20 2216)  Pulse: 91 (03/19/20 2235)  Resp: 16 (03/19/20 2235)  BP: 113/64 (03/19/20 2216)  SpO2: 96 % (03/19/20 2235) Vital Signs (24h Range):  Temp:  [100.9 °F (38.3 °C)-102.2 °F (39 °C)] 100.9 °F (38.3 °C)  Pulse:  [81-95] 91  Resp:  [16-38] 16  SpO2:  [93 %-96 %] 96 %  BP: (113-135)/(60-73) 113/64     Weight: 70.3 kg (155 lb)  Body mass index is 24.28 kg/m².    Physical Exam   Constitutional:   Frail appearing male in mild respiratory distress.   HENT:   Head: Normocephalic.   Eyes: Pupils are equal, round, and reactive to light.   Redness to inner eyelids   Neck: Normal range of motion. Neck supple. No thyromegaly present.   Cardiovascular: Regular rhythm, normal heart sounds and  intact distal pulses.   Tachycardic   Pulmonary/Chest: Accessory muscle usage present. Tachypnea noted. He is in respiratory distress. He has wheezes.   Abdominal: Soft. Bowel sounds are normal.   Musculoskeletal: Normal range of motion. He exhibits deformity.   Mild contractures to upper and lower extremities   Neurological: He is alert.   Skin: Skin is warm and dry. Capillary refill takes 2 to 3 seconds.   Flushed appearance   Psychiatric: He has a normal mood and affect.         CRANIAL NERVES     CN III, IV, VI   Pupils are equal, round, and reactive to light.       Significant Labs:   ABGs: No results for input(s): PH, PCO2, HCO3, POCSATURATED, BE, TOTALHB, COHB, METHB, O2HB, POCFIO2 in the last 48 hours.  CBC:   Recent Labs   Lab 03/19/20 1930   WBC 4.62   HGB 11.7*   HCT 34.9*        CMP:   Recent Labs   Lab 03/19/20 1930      K 4.1      CO2 24      BUN 22   CREATININE 0.7   CALCIUM 9.1   PROT 7.3   ALBUMIN 3.9   BILITOT 0.6   ALKPHOS 70   AST 24   ALT 16   ANIONGAP 11   EGFRNONAA >60.0     Cardiac Markers:   Recent Labs   Lab 03/19/20 1930   BNP 13     Lactic Acid:   Recent Labs   Lab 03/19/20 1930   LACTATE 1.1     Urine Studies:   Recent Labs   Lab 03/19/20  2103   COLORU Yellow   APPEARANCEUA Clear   PHUR 6.0   SPECGRAV 1.030   PROTEINUA Trace*   GLUCUA Negative   KETONESU Trace*   BILIRUBINUA Negative   OCCULTUA Negative   NITRITE Negative   UROBILINOGEN Negative   LEUKOCYTESUR Negative       Significant Imaging:  Imaging Results          X-Ray Chest AP Portable (Final result)  Result time 03/19/20 19:41:51    Final result by Gavino Lazo MD (03/19/20 19:41:51)                 Impression:      1. Faint bilateral pulmonary opacities suspicious for bilateral pulmonary infiltrates, right greater than left.  2. No other significant findings.      Electronically signed by: Gavino Lazo MD  Date:    03/19/2020  Time:    19:41             Narrative:    EXAMINATION:  XR  CHEST AP PORTABLE    CLINICAL HISTORY:  Shortness of breath, fever    COMPARISON:  June 2014    FINDINGS:  Heart size is normal.  The aorta is elongated.  Faint bilateral pulmonary opacities are noted suspicious for bilateral pulmonary infiltrates, right greater than left.  There are no pleural effusions.  No acute osseous abnormalities are identified.                            No results found for this or any previous visit.    Assessment/Plan:     * Aspiration pneumonia  Monitor oxygen levels  Oxygen to keep sats greater then 90%  SLP swallow assessment in am, NPO till then.  IV Azthromycin daily.  Consult to ID regarding Pneumonia vs COVID.      Tachypnea  Monitor pt's oxygenation and respiratory rate.  Oxygen PRN via NC  Duo nebs Q 6 H  Concern for PE in immobilized pt check CTA of chest.  ABG's reassuring      HTN (hypertension)  Monitor BP  Hold PO meds for now.      Fever  Monitor VS Q 3 h  Tylenol supp PRN for temp >101  IV  cc/h      Suspected Covid-19 Virus Infection  COVID possibility pt resides in a nursing facility, testing done  Keep in isolation        VTE Risk Mitigation (From admission, onward)    None             Vanessa Mccullough, TREEP  Department of Hospital Medicine   [facility]

## 2020-03-20 NOTE — RESPIRATORY THERAPY
This note also relates to the following rows which could not be included:  SpO2 - Cannot attach notes to unvalidated device data  Pulse - Cannot attach notes to unvalidated device data  Resp - Cannot attach notes to unvalidated device data       03/19/20 4681   PRE-TX-O2   O2 Device (Oxygen Therapy) nasal cannula   Flow (L/min) 3   Labs   $ Was an ABG obtained? Arterial Puncture;ISTAT - Blood gas;ISTAT - Calcium;ISTAT - Hematocrit;ISTAT - Potassium;ISTAT - Sodium   $ Labs Tech Time 15 min   ABG

## 2020-03-20 NOTE — PLAN OF CARE
03/20/20 1213   Discharge Assessment   Assessment Type Discharge Planning Assessment   Confirmed/corrected address and phone number on facesheet? Yes   Assessment information obtained from? Caregiver   Expected Length of Stay (days) 2   Communicated expected length of stay with patient/caregiver yes   Prior to hospitilization cognitive status: Not Oriented to Place;Not Oriented to Time   Prior to hospitalization functional status: Assistive Equipment;Needs Assistance   Current cognitive status: Not Oriented to Place;Not Oriented to Time   Current Functional Status: Assistive Equipment;Needs Assistance   Facility Arrived From: [facility]   Lives With alone   Able to Return to Prior Arrangements yes   Is patient able to care for self after discharge? Unable to determine at this time (comments)   Who are your caregiver(s) and their phone number(s)? [facility] Staff and his Sister Mrs Martin at cell [phone]   Patient's perception of discharge disposition nursing home   Readmission Within the Last 30 Days no previous admission in last 30 days   Patient currently being followed by outpatient case management? Yes   If yes, name of outpatient case management following: other (comments)  ([facility] CM/SW.)   Patient currently receives any other outside agency services? No   Equipment Currently Used at Home shower chair;wheelchair   Do you have any problems affording any of your prescribed medications? No   Is the patient taking medications as prescribed? yes   Does the patient have transportation home? Yes   Transportation Anticipated agency  ([facility])   Discharge Plan A Return to nursing home   Discharge Plan B Return to Nursing Home   DME Needed Upon Discharge  none   Patient/Family in Agreement with Plan yes     Pt with non verbal hx, no family numbers on FS, went to room doorway and asked pt about doing an assessment, he opened his eyes upon request but no response to yes or no questions . Called  [facility] at [phone] and spoke with Mrs Holliday, gave this CM sister Mrs Martin cell [phone], called and spoke pt sister and completed initial assesment, she has this CM number [phone] and the unit number [id number] for any further needs. CM will follow for DC Planning needs.

## 2020-03-20 NOTE — RESPIRATORY THERAPY
03/19/20 2238   Patient Assessment/Suction   Level of Consciousness (AVPU) responds to voice   Respiratory Effort Shallow   Expansion/Accessory Muscles/Retractions expansion symmetric;no retractions;no use of accessory muscles   All Lung Fields Breath Sounds diminished   Rhythm/Pattern, Respiratory pattern regular;unlabored   PRE-TX-O2   O2 Device (Oxygen Therapy) nasal cannula   $ Is the patient on Low Flow Oxygen? Yes   Flow (L/min) 3   SpO2 96 %   Pulse Oximetry Type Continuous   $ Pulse Oximetry - Multiple Charge Pulse Oximetry - Multiple   Pulse 91   Resp 16   Aerosol Therapy   $ Aerosol Therapy Charges Aerosol Treatment   Daily Review of Necessity (SVN) completed   Respiratory Treatment Status (SVN) given   Treatment Route (SVN) air;mask   Patient Position (SVN) Canales's   Post Treatment Assessment (SVN) breath sounds unchanged   Signs of Intolerance (SVN) none   Breath Sounds Post-Respiratory Treatment   Throughout All Fields Post-Treatment All Fields   Throughout All Fields Post-Treatment no change   Post-treatment Heart Rate (beats/min) 96   Post-treatment Resp Rate (breaths/min) 22   Respiratory Evaluation   $ Care Plan Tech Time 15 min   Evaluation For New Orders

## 2020-03-20 NOTE — PLAN OF CARE
Pt stable. VSS WDL. Pt turned q 2hrs; pt has a pillow to elevated his heels & I put a pillow under his back (when turning from side to side). Pt had some redness on his sacrum and heels, but it is blanchable.    Tried to feed pt, but he has very low appetite. Pt will restart IV fluids at 1930        Problem: Infection  Goal: Infection Symptom Resolution  Outcome: Ongoing, Progressing

## 2020-03-20 NOTE — SUBJECTIVE & OBJECTIVE
Past Medical History:   Diagnosis Date    Diabetes mellitus     GERD (gastroesophageal reflux disease)     Hypertension     Stroke        History reviewed. No pertinent surgical history.    Review of patient's allergies indicates:  No Known Allergies    No current facility-administered medications on file prior to encounter.      Current Outpatient Medications on File Prior to Encounter   Medication Sig    acetaminophen (TYLENOL) 650 MG TbSR Take 650 mg by mouth every 6 (six) hours as needed.    aspirin (ECOTRIN) 81 MG EC tablet Take 81 mg by mouth once daily.    carbidopa-levodopa  mg (SINEMET)  mg per tablet Take 1 tablet by mouth 3 (three) times daily.    carbidopa-levodopa  mg (SINEMET CR)  mg TbSR Take 2 tablets by mouth 3 (three) times daily.    dext 70/polycarbophil/peg/NaCl (ARTIFICIAL TEAR SOLUTION OPHT) Place 2 drops into both eyes 2 (two) times daily.    dextromethorphan-quinidine 20-10 mg (NUEDEXTA) 20-10 mg per capsule Take 1 capsule by mouth 2 (two) times daily.    ketoconazole (NIZORAL) 2 % shampoo Apply 1 application topically every Mon, Wed, Fri.    lisinopriL 10 MG tablet Take 10 mg by mouth every evening.    mirtazapine (REMERON) 15 MG tablet Take 15 mg by mouth every evening.    multivitamin (THERAGRAN) per tablet Take 1 tablet by mouth once daily.    soap (BABY SHAMPOO TOP) Apply 1 application topically 2 (two) times daily.    triamcinolone acetonide 0.025% (KENALOG) 0.025 % cream Apply 1 application topically 2 (two) times daily.    vitamin D (VITAMIN D3) 1000 units Tab Take 1,000 Units by mouth once daily.    [DISCONTINUED] metFORMIN (GLUCOPHAGE) 500 MG tablet Take 500 mg by mouth daily with breakfast.     Family History     None        Tobacco Use    Smoking status: Never Smoker    Smokeless tobacco: Never Used   Substance and Sexual Activity    Alcohol use: Not on file    Drug use: Not on file    Sexual activity: Not on file     Review of  Systems   Unable to perform ROS: Patient nonverbal     Objective:     Vital Signs (Most Recent):  Temp: (!) 100.9 °F (38.3 °C) (03/19/20 2216)  Pulse: 91 (03/19/20 2235)  Resp: 16 (03/19/20 2235)  BP: 113/64 (03/19/20 2216)  SpO2: 96 % (03/19/20 2235) Vital Signs (24h Range):  Temp:  [100.9 °F (38.3 °C)-102.2 °F (39 °C)] 100.9 °F (38.3 °C)  Pulse:  [81-95] 91  Resp:  [16-38] 16  SpO2:  [93 %-96 %] 96 %  BP: (113-135)/(60-73) 113/64     Weight: 70.3 kg (155 lb)  Body mass index is 24.28 kg/m².    Physical Exam   Constitutional:   Frail appearing male in mild respiratory distress.   HENT:   Head: Normocephalic.   Eyes: Pupils are equal, round, and reactive to light.   Redness to inner eyelids   Neck: Normal range of motion. Neck supple. No thyromegaly present.   Cardiovascular: Regular rhythm, normal heart sounds and intact distal pulses.   Tachycardic   Pulmonary/Chest: Accessory muscle usage present. Tachypnea noted. He is in respiratory distress. He has wheezes.   Abdominal: Soft. Bowel sounds are normal.   Musculoskeletal: Normal range of motion. He exhibits deformity.   Mild contractures to upper and lower extremities   Neurological: He is alert.   Skin: Skin is warm and dry. Capillary refill takes 2 to 3 seconds.   Flushed appearance   Psychiatric: He has a normal mood and affect.         CRANIAL NERVES     CN III, IV, VI   Pupils are equal, round, and reactive to light.       Significant Labs:   ABGs: No results for input(s): PH, PCO2, HCO3, POCSATURATED, BE, TOTALHB, COHB, METHB, O2HB, POCFIO2 in the last 48 hours.  CBC:   Recent Labs   Lab 03/19/20 1930   WBC 4.62   HGB 11.7*   HCT 34.9*        CMP:   Recent Labs   Lab 03/19/20 1930      K 4.1      CO2 24      BUN 22   CREATININE 0.7   CALCIUM 9.1   PROT 7.3   ALBUMIN 3.9   BILITOT 0.6   ALKPHOS 70   AST 24   ALT 16   ANIONGAP 11   EGFRNONAA >60.0     Cardiac Markers:   Recent Labs   Lab 03/19/20  1930   BNP 13     Lactic Acid:    Recent Labs   Lab 03/19/20  1930   LACTATE 1.1     Urine Studies:   Recent Labs   Lab 03/19/20  2103   COLORU Yellow   APPEARANCEUA Clear   PHUR 6.0   SPECGRAV 1.030   PROTEINUA Trace*   GLUCUA Negative   KETONESU Trace*   BILIRUBINUA Negative   OCCULTUA Negative   NITRITE Negative   UROBILINOGEN Negative   LEUKOCYTESUR Negative       Significant Imaging:  Imaging Results          X-Ray Chest AP Portable (Final result)  Result time 03/19/20 19:41:51    Final result by Gavino Lazo MD (03/19/20 19:41:51)                 Impression:      1. Faint bilateral pulmonary opacities suspicious for bilateral pulmonary infiltrates, right greater than left.  2. No other significant findings.      Electronically signed by: Gavino Lazo MD  Date:    03/19/2020  Time:    19:41             Narrative:    EXAMINATION:  XR CHEST AP PORTABLE    CLINICAL HISTORY:  Shortness of breath, fever    COMPARISON:  June 2014    FINDINGS:  Heart size is normal.  The aorta is elongated.  Faint bilateral pulmonary opacities are noted suspicious for bilateral pulmonary infiltrates, right greater than left.  There are no pleural effusions.  No acute osseous abnormalities are identified.                            No results found for this or any previous visit.

## 2020-03-20 NOTE — PT/OT/SLP PROGRESS
Speech Language Pathology      Mukesh Addison  MRN: [id number]    Patient not seen today secondary to: orders received from ED. Order being discharged secondary to current workup and isolation status. Please note Pt with history of severe pharyngeal dysphagia. Recent MBS completed 12/2010 w/ aspiration of thin and nectar thick liquids. Nectar thick liquids with CHIN TUCK & mechanical soft solids recommended however, if Pt unable to consistently complete chin tuck, HONEY THICK LIQUID would be necessary.     MD to consider honey thick liquid with mechanical soft OR puree for pleasure pending Pt's medical presentation.     Kaveh Davis, REBECA-SLP

## 2020-03-20 NOTE — SUBJECTIVE & OBJECTIVE
Interval History: Admitted for pneumonia. Pt is non-verbal at baseline due to hx of CVA. Tmax overnight of 102.2F. Needing 2L of supplemental oxygen. Appears comfortable during my encounter. No cough.     Review of Systems   Unable to perform ROS: Patient nonverbal     Objective:     Vital Signs (Most Recent):  Temp: 98.4 °F (36.9 °C) (03/20/20 1121)  Pulse: 61 (03/20/20 1121)  Resp: 17 (03/20/20 1121)  BP: 105/60 (03/20/20 1121)  SpO2: 96 % (03/20/20 1121) Vital Signs (24h Range):  Temp:  [96.1 °F (35.6 °C)-102.2 °F (39 °C)] 98.4 °F (36.9 °C)  Pulse:  [58-95] 61  Resp:  [16-38] 17  SpO2:  [93 %-98 %] 96 %  BP: (105-135)/(60-73) 105/60     Weight: 69.1 kg (152 lb 5.4 oz)  Body mass index is 25.35 kg/m².    Intake/Output Summary (Last 24 hours) at 3/20/2020 1405  Last data filed at 3/20/2020 0610  Gross per 24 hour   Intake 2650 ml   Output 1500 ml   Net 1150 ml      Physical Exam   Constitutional: No distress.   Frail  male in no acute distress   HENT:   Head: Normocephalic and atraumatic.   Eyes: Conjunctivae are normal. No scleral icterus.   Neck: Neck supple. No thyromegaly present.   Cardiovascular: Normal rate, regular rhythm, normal heart sounds and intact distal pulses.   Pulmonary/Chest: Effort normal. No accessory muscle usage. No tachypnea. No respiratory distress. He has no wheezes.   Scattered coarse rhonchi (R>L)   Abdominal: Soft. Bowel sounds are normal. He exhibits no distension.   Musculoskeletal: He exhibits deformity. He exhibits no edema.   Mild contractures to upper and lower extremities   Neurological: He is alert. He exhibits abnormal muscle tone. He displays no seizure activity. GCS eye subscore is 3. GCS verbal subscore is 1. GCS motor subscore is 6.   Following commands    Skin: Skin is warm and dry. Capillary refill takes less than 2 seconds. He is not diaphoretic.   Flushed appearance   Psychiatric: He has a normal mood and affect. He is slowed.       Significant Labs:   CBC:    Recent Labs   Lab 03/19/20 1930 03/19/20  2253 03/20/20  0416   WBC 4.62  --  3.02*   HGB 11.7*  --  10.9*   HCT 34.9* 33* 33.2*     --  132*     CMP:   Recent Labs   Lab 03/19/20 1930 03/20/20  0416    142   K 4.1 3.6    113*   CO2 24 24    152*   BUN 22 16   CREATININE 0.7 0.6   CALCIUM 9.1 8.3*   PROT 7.3  --    ALBUMIN 3.9  --    BILITOT 0.6  --    ALKPHOS 70  --    AST 24  --    ALT 16  --    ANIONGAP 11 5*   EGFRNONAA >60.0 >60.0     Cardiac Markers:   Recent Labs   Lab 03/19/20 1930   BNP 13     Magnesium:   Recent Labs   Lab 03/19/20 1930 03/20/20 0416   MG 2.1 2.1     Troponin:   Recent Labs   Lab 03/19/20 1930   TROPONINI <0.030       Significant Imaging: I have reviewed all pertinent imaging results/findings within the past 24 hours.     Procedure Component Value Units Date/Time   CTA Chest Non Coronary [[id number]] Resulted: 03/20/20 1000   Order Status: Completed Updated: 03/20/20 1003   Narrative:     EXAMINATION:  CTA CHEST NON CORONARY    CLINICAL HISTORY:  PE suspected;    TECHNIQUE:  CMS MANDATED QUALITY DATA - CT RADIATION - 436    All CT scans at this facility utilize dose modulation, iterative reconstruction, and/or weight based dosing when appropriate to reduce radiation dose to as low as reasonably achievable.    Maximum intensity projection coronal and sagittal reformations were created at a separate workstation and stored in the patients permanent medical record.    100  cc Omnipaque 350 was administered.    COMPARISON:  Chest radiograph 03/20/2020    FINDINGS:  Contrast bolus timing is adequate.  No central pulmonary embolism is evident.  There is moderate respiratory motion which significantly degrades image quality and exam sensitivity regarding peripheral pulmonary arteries.  Within these limitations, no convincing segmental or subsegmental pulmonary arterial filling defect is evident.    There are patchy ground-glass and alveolar opacities within both  lungs, upper lobe predominant.  No pleural fluid.  No pneumothorax.    Thoracic aorta is normal in caliber noting mild atherosclerotic calcification of the arch.  Coronary artery atherosclerotic calcification also noted.    Bone window images demonstrate no acute or aggressive osseous abnormality.    Images through the upper abdomen demonstrate cholelithiasis and are otherwise unremarkable.   Impression:       Negative for central pulmonary embolism.  Limited assessment of peripheral pulmonary arteries due to motion artifact.    Patchy opacities within both lungs consistent with multifocal pneumonia.    Atherosclerosis, cholelithiasis, and other incidental findings as above.      Electronically signed by: Cresencio Shaw MD  Date: 03/20/2020  Time: 10:00

## 2020-03-21 LAB
ANION GAP SERPL CALC-SCNC: 8 MMOL/L (ref 8–16)
BASOPHILS # BLD AUTO: 0 K/UL (ref 0–0.2)
BASOPHILS NFR BLD: 0 % (ref 0–1.9)
BUN SERPL-MCNC: 19 MG/DL (ref 8–23)
CALCIUM SERPL-MCNC: 8.6 MG/DL (ref 8.7–10.5)
CHLORIDE SERPL-SCNC: 112 MMOL/L (ref 95–110)
CO2 SERPL-SCNC: 23 MMOL/L (ref 23–29)
CREAT SERPL-MCNC: 0.7 MG/DL (ref 0.5–1.4)
DIFFERENTIAL METHOD: ABNORMAL
EOSINOPHIL # BLD AUTO: 0 K/UL (ref 0–0.5)
EOSINOPHIL NFR BLD: 0 % (ref 0–8)
ERYTHROCYTE [DISTWIDTH] IN BLOOD BY AUTOMATED COUNT: 12.2 % (ref 11.5–14.5)
EST. GFR  (AFRICAN AMERICAN): >60 ML/MIN/1.73 M^2
EST. GFR  (NON AFRICAN AMERICAN): >60 ML/MIN/1.73 M^2
GLUCOSE SERPL-MCNC: 102 MG/DL (ref 70–110)
GLUCOSE SERPL-MCNC: 111 MG/DL (ref 70–110)
GLUCOSE SERPL-MCNC: 115 MG/DL (ref 70–110)
GLUCOSE SERPL-MCNC: 120 MG/DL (ref 70–110)
GLUCOSE SERPL-MCNC: 141 MG/DL (ref 70–110)
HCT VFR BLD AUTO: 33.6 % (ref 40–54)
HGB BLD-MCNC: 11.1 G/DL (ref 14–18)
IMM GRANULOCYTES # BLD AUTO: 0.02 K/UL (ref 0–0.04)
IMM GRANULOCYTES NFR BLD AUTO: 0.3 % (ref 0–0.5)
LYMPHOCYTES # BLD AUTO: 1.1 K/UL (ref 1–4.8)
LYMPHOCYTES NFR BLD: 16.8 % (ref 18–48)
MAGNESIUM SERPL-MCNC: 2.2 MG/DL (ref 1.6–2.6)
MCH RBC QN AUTO: 32 PG (ref 27–31)
MCHC RBC AUTO-ENTMCNC: 33 G/DL (ref 32–36)
MCV RBC AUTO: 97 FL (ref 82–98)
MONOCYTES # BLD AUTO: 0.6 K/UL (ref 0.3–1)
MONOCYTES NFR BLD: 8.5 % (ref 4–15)
NEUTROPHILS # BLD AUTO: 5.1 K/UL (ref 1.8–7.7)
NEUTROPHILS NFR BLD: 74.4 % (ref 38–73)
NRBC BLD-RTO: 0 /100 WBC
PHOSPHATE SERPL-MCNC: 3.2 MG/DL (ref 2.7–4.5)
PLATELET # BLD AUTO: 150 K/UL (ref 150–350)
PMV BLD AUTO: 11 FL (ref 9.2–12.9)
POTASSIUM SERPL-SCNC: 3.5 MMOL/L (ref 3.5–5.1)
RBC # BLD AUTO: 3.47 M/UL (ref 4.6–6.2)
SODIUM SERPL-SCNC: 143 MMOL/L (ref 136–145)
WBC # BLD AUTO: 6.79 K/UL (ref 3.9–12.7)

## 2020-03-21 PROCEDURE — 25000003 PHARM REV CODE 250: Performed by: INTERNAL MEDICINE

## 2020-03-21 PROCEDURE — 25000242 PHARM REV CODE 250 ALT 637 W/ HCPCS: Performed by: NURSE PRACTITIONER

## 2020-03-21 PROCEDURE — 94640 AIRWAY INHALATION TREATMENT: CPT

## 2020-03-21 PROCEDURE — 84100 ASSAY OF PHOSPHORUS: CPT

## 2020-03-21 PROCEDURE — 85025 COMPLETE CBC W/AUTO DIFF WBC: CPT

## 2020-03-21 PROCEDURE — 99900035 HC TECH TIME PER 15 MIN (STAT)

## 2020-03-21 PROCEDURE — 63600175 PHARM REV CODE 636 W HCPCS: Performed by: NURSE PRACTITIONER

## 2020-03-21 PROCEDURE — 83735 ASSAY OF MAGNESIUM: CPT

## 2020-03-21 PROCEDURE — 12000002 HC ACUTE/MED SURGE SEMI-PRIVATE ROOM

## 2020-03-21 PROCEDURE — 80048 BASIC METABOLIC PNL TOTAL CA: CPT

## 2020-03-21 PROCEDURE — 63600175 PHARM REV CODE 636 W HCPCS: Performed by: INTERNAL MEDICINE

## 2020-03-21 PROCEDURE — 94761 N-INVAS EAR/PLS OXIMETRY MLT: CPT

## 2020-03-21 PROCEDURE — 36415 COLL VENOUS BLD VENIPUNCTURE: CPT

## 2020-03-21 PROCEDURE — 27000221 HC OXYGEN, UP TO 24 HOURS

## 2020-03-21 RX ADMIN — ACETAMINOPHEN 650 MG: 325 TABLET ORAL at 08:03

## 2020-03-21 RX ADMIN — ASPIRIN 81 MG: 81 TABLET, DELAYED RELEASE ORAL at 09:03

## 2020-03-21 RX ADMIN — HYDROXYCHLOROQUINE SULFATE 200 MG: 200 TABLET, FILM COATED ORAL at 09:03

## 2020-03-21 RX ADMIN — CARBIDOPA AND LEVODOPA 1 TABLET: 25; 100 TABLET ORAL at 04:03

## 2020-03-21 RX ADMIN — ENOXAPARIN SODIUM 40 MG: 100 INJECTION SUBCUTANEOUS at 04:03

## 2020-03-21 RX ADMIN — IPRATROPIUM BROMIDE AND ALBUTEROL SULFATE 3 ML: .5; 3 SOLUTION RESPIRATORY (INHALATION) at 02:03

## 2020-03-21 RX ADMIN — CARBIDOPA AND LEVODOPA 1 TABLET: 25; 100 TABLET ORAL at 08:03

## 2020-03-21 RX ADMIN — IPRATROPIUM BROMIDE AND ALBUTEROL SULFATE 3 ML: .5; 3 SOLUTION RESPIRATORY (INHALATION) at 08:03

## 2020-03-21 RX ADMIN — SODIUM CHLORIDE: 0.9 INJECTION, SOLUTION INTRAVENOUS at 04:03

## 2020-03-21 RX ADMIN — LISINOPRIL 10 MG: 10 TABLET ORAL at 08:03

## 2020-03-21 RX ADMIN — AZITHROMYCIN MONOHYDRATE 500 MG: 500 INJECTION, POWDER, LYOPHILIZED, FOR SOLUTION INTRAVENOUS at 08:03

## 2020-03-21 RX ADMIN — HYDROXYCHLOROQUINE SULFATE 200 MG: 200 TABLET, FILM COATED ORAL at 08:03

## 2020-03-21 RX ADMIN — CARBIDOPA AND LEVODOPA 1 TABLET: 25; 100 TABLET ORAL at 09:03

## 2020-03-21 RX ADMIN — IPRATROPIUM BROMIDE AND ALBUTEROL SULFATE 3 ML: .5; 3 SOLUTION RESPIRATORY (INHALATION) at 01:03

## 2020-03-21 RX ADMIN — CEFTRIAXONE 1 G: 1 INJECTION, SOLUTION INTRAVENOUS at 10:03

## 2020-03-21 RX ADMIN — SODIUM CHLORIDE: 0.9 INJECTION, SOLUTION INTRAVENOUS at 11:03

## 2020-03-21 NOTE — PROGRESS NOTES
[facility] Medicine  Progress Note    Patient Name: Mukesh Addison  MRN: [id number]  Patient Class: IP- Inpatient   Admission Date: 3/19/2020  Length of Stay: 1 days  Attending Physician: Elie Mcghee MD  Primary Care Provider: Primary Doctor No        Subjective:     Principal Problem:Pneumonia of both lungs due to infectious organism      Interval History: Afebrile overnight. Appears comfortable during my encounter. No reported cough.     Review of Systems   Unable to perform ROS: Patient nonverbal     Objective:     Vital Signs (Most Recent):  Temp: 98.2 °F (36.8 °C) (03/21/20 1645)  Pulse: 84 (03/21/20 1645)  Resp: 18 (03/21/20 1645)  BP: 134/63 (03/21/20 1645)  SpO2: (!) 93 % (03/21/20 1645) Vital Signs (24h Range):  Temp:  [97.4 °F (36.3 °C)-98.8 °F (37.1 °C)] 98.2 °F (36.8 °C)  Pulse:  [66-97] 84  Resp:  [16-30] 18  SpO2:  [92 %-98 %] 93 %  BP: (102-134)/(50-65) 134/63     Weight: 69.1 kg (152 lb 5.4 oz)  Body mass index is 25.35 kg/m².    Intake/Output Summary (Last 24 hours) at 3/21/2020 1725  Last data filed at 3/21/2020 1645  Gross per 24 hour   Intake 1137.5 ml   Output 1850 ml   Net -712.5 ml      Physical Exam   Constitutional: No distress.   Frail  male in no acute distress   HENT:   Head: Normocephalic and atraumatic.   Eyes: Conjunctivae are normal. No scleral icterus.   Neck: Neck supple. No thyromegaly present.   Cardiovascular: Normal rate, regular rhythm, normal heart sounds and intact distal pulses.   Pulmonary/Chest: Effort normal. No accessory muscle usage. No tachypnea. No respiratory distress. He has no wheezes.   Scattered coarse rhonchi (R>L)   Abdominal: Soft. Bowel sounds are normal. He exhibits no distension.   Musculoskeletal: He exhibits deformity. He exhibits no edema.   Mild contractures to upper and lower extremities   Neurological: He is alert. He exhibits abnormal muscle tone. He displays no seizure activity. GCS eye subscore is 3. GCS verbal  subscore is 1. GCS motor subscore is 6.   Following commands    Skin: Skin is warm and dry. Capillary refill takes less than 2 seconds. He is not diaphoretic.   Flushed appearance   Psychiatric: He has a normal mood and affect. He is slowed.   Nursing note and vitals reviewed.      Significant Labs:   CBC:   Recent Labs   Lab 03/19/20 1930 03/19/20  2253 03/20/20 0416 03/21/20  0347   WBC 4.62  --  3.02* 6.79   HGB 11.7*  --  10.9* 11.1*   HCT 34.9* 33* 33.2* 33.6*     --  132* 150     CMP:   Recent Labs   Lab 03/19/20 1930 03/20/20 0416 03/21/20  0347    142 143   K 4.1 3.6 3.5    113* 112*   CO2 24 24 23    152* 115*   BUN 22 16 19   CREATININE 0.7 0.6 0.7   CALCIUM 9.1 8.3* 8.6*   PROT 7.3  --   --    ALBUMIN 3.9  --   --    BILITOT 0.6  --   --    ALKPHOS 70  --   --    AST 24  --   --    ALT 16  --   --    ANIONGAP 11 5* 8   EGFRNONAA >60.0 >60.0 >60.0     Microbiology Results (last 7 days)     Procedure Component Value Units Date/Time    Strep A culture, throat [[id number]] Collected:  03/19/20 2000    Order Status:  Completed Specimen:  Throat Updated:  03/21/20 0820     Strep A Culture No significant growth    Urine culture [[id number]] Collected:  03/19/20 2103    Order Status:  Completed Specimen:  Urine, Clean Catch Updated:  03/21/20 0800     Urine Culture, Routine No growth to date    Narrative:       Indicated criteria for high risk culture:->Other  Other (specify):->possible sepsis    Blood culture x two cultures. Draw prior to antibiotics. [[id number]] Collected:  03/19/20 2005    Order Status:  Completed Specimen:  Blood from Peripheral, Antecubital, Right Updated:  03/20/20 2232     Blood Culture, Routine No Growth to date      No Growth to date    Narrative:       Aerobic and anaerobic    Blood culture x two cultures. Draw prior to antibiotics. [[id number]] Collected:  03/19/20 1930    Order Status:  Completed Specimen:  Blood from Peripheral, Upper Arm, Right Updated:   03/20/20 2232     Blood Culture, Routine No Growth to date      No Growth to date    Narrative:       Aerobic and anaerobic    MRSA Screen by PCR [[id number]] Collected:  03/19/20 2210    Order Status:  Completed Specimen:  Nasopharyngeal Swab from Nasal Updated:  03/20/20 0033     MRSA SCREEN BY PCR Negative    Throat Screen, Rapid [[id number]] Collected:  03/19/20 2000    Order Status:  Completed Specimen:  Throat Updated:  03/19/20 2041     Rapid Strep A Screen Negative     Comment: See Micro for reflexed Strep culture.               Assessment/Plan:      Active Hospital Problems    Diagnosis  POA    *Pneumonia of both lungs due to infectious organism [J18.9]  Yes    Suspected Covid-19 Virus Infection [R68.89]  Yes     Priority: 2     Pharyngeal dysphagia [R13.13]  Yes    CVA (cerebral vascular accident) [I63.9]  Yes     Chronic    Gait instability [R26.81]  Yes    Aspiration pneumonia [J69.0]  Yes    HTN (hypertension) [I10]  Yes      Resolved Hospital Problems   No resolved problems to display.       Plan:  Supplemental oxygen; wean as tolerated  Continue empiric antibiotic therapy with ceftriaxone and azithromycin; follow blood cultures   Continue hydroxychloroquine for suspected COVID-19; PCR is currently pending  Reviewed CTA chest; no central PE, pattern appears to be lobar (aspiration vs bacterial)  Appreciate recommendations from speech therapy  Recent MBSS with severe pharyngeal dysphagia. Honey thick diet as per speech therapy recommendation  Air bone and droplet isolation precautions for suspected COVID-19  Continue home medications for chronic medical conditions  QTc monitoring while on hydroxychloroquine. Will hold home mirtazapine for now      VTE Risk Mitigation (From admission, onward)         Ordered     enoxaparin injection 40 mg  Every 24 hours (non-standard times)      03/20/20 1439     IP VTE LOW RISK PATIENT  Once      03/20/20 0247     Place sequential compression device  Until  discontinued      03/20/20 0247                      Elie Mcghee MD  Department of Hospital Medicine   [facility]

## 2020-03-21 NOTE — CARE UPDATE
03/21/20 1432   Patient Assessment/Suction   Level of Consciousness (AVPU) responds to voice   Respiratory Effort Normal;Unlabored   Expansion/Accessory Muscles/Retractions no use of accessory muscles;no retractions;expansion symmetric   All Lung Fields Breath Sounds diminished   Rhythm/Pattern, Respiratory unlabored;pattern regular   Cough Frequency infrequent   Cough Type no productive sputum   PRE-TX-O2   O2 Device (Oxygen Therapy) nasal cannula   Flow (L/min) 2   SpO2 (!) 93 %   Pulse Oximetry Type Intermittent   $ Pulse Oximetry - Multiple Charge Pulse Oximetry - Multiple   Pulse 76   Resp 16   Aerosol Therapy   $ Aerosol Therapy Charges Aerosol Treatment   Daily Review of Necessity (SVN) completed   Respiratory Treatment Status (SVN) given   Treatment Route (SVN) oxygen;mask   Patient Position (SVN) semi-Canales's   Post Treatment Assessment (SVN) breath sounds unchanged   Signs of Intolerance (SVN) none   Respiratory Evaluation   $ Care Plan Tech Time 15 min

## 2020-03-21 NOTE — CARE UPDATE
03/21/20 0834   Patient Assessment/Suction   Level of Consciousness (AVPU) alert   Expansion/Accessory Muscles/Retractions no retractions;no use of accessory muscles   All Lung Fields Breath Sounds diminished   Rhythm/Pattern, Respiratory tachypneic;shallow   Cough Frequency frequent   Cough Type no productive sputum;nonproductive   PRE-TX-O2   O2 Device (Oxygen Therapy) nasal cannula   $ Is the patient on Low Flow Oxygen? Yes   Flow (L/min) 2   SpO2 (!) 92 %   Pulse Oximetry Type Intermittent   $ Pulse Oximetry - Multiple Charge Pulse Oximetry - Multiple   Pulse 97   Resp (!) 22   Positioning HOB elevated 45 degrees   Aerosol Therapy   $ Aerosol Therapy Charges Aerosol Treatment   Daily Review of Necessity (SVN) completed   Respiratory Treatment Status (SVN) given   Treatment Route (SVN) mask;air   Patient Position (SVN) semi-Canales's   Post Treatment Assessment (SVN) breath sounds unchanged   Signs of Intolerance (SVN) none   Respiratory Evaluation   $ Care Plan Tech Time 15 min

## 2020-03-21 NOTE — SUBJECTIVE & OBJECTIVE
Interval History:  No overnight events reported. Plan to d/c to nursing home with hospice. Awaiting COVID-19 result.    Review of Systems   Unable to perform ROS: Patient nonverbal     Objective:     Vital Signs (Most Recent):  Temp: 98.6 °F (37 °C) (03/28/20 1126)  Pulse: (!) 56 (03/28/20 1126)  Resp: 18 (03/28/20 1126)  BP: 131/63 (03/28/20 1126)  SpO2: 97 % (03/28/20 1126) Vital Signs (24h Range):  Temp:  [98.5 °F (36.9 °C)-99 °F (37.2 °C)] 98.6 °F (37 °C)  Pulse:  [56-66] 56  Resp:  [18-26] 18  SpO2:  [96 %-99 %] 97 %  BP: (118-134)/(63-79) 131/63     Weight: 70.2 kg (154 lb 12.2 oz)  Body mass index is 25.75 kg/m².    Intake/Output Summary (Last 24 hours) at 3/28/2020 1547  Last data filed at 3/28/2020 0549  Gross per 24 hour   Intake --   Output 1050 ml   Net -1050 ml      Physical Exam   Constitutional:   Frail  male in no acute distress   HENT:   Head: Normocephalic and atraumatic.   Eyes: Conjunctivae are normal. No scleral icterus.   Neck: Neck supple. No thyromegaly present.   Cardiovascular: Normal rate, regular rhythm, normal heart sounds and intact distal pulses.   Pulmonary/Chest:   Scattered coarse rhonchi (R>L)   Abdominal: Soft. Bowel sounds are normal. He exhibits no distension.   Musculoskeletal: He exhibits deformity. He exhibits no edema.   Mild contractures to upper and lower extremities   Neurological: He is alert. He displays no atrophy.   Non-focal    Skin: Skin is warm and dry. Capillary refill takes less than 2 seconds. He is not diaphoretic.   Nursing note and vitals reviewed.      Significant Labs:   CBC:   Recent Labs   Lab 03/27/20 0411 03/28/20  0406   WBC 5.54 6.08   HGB 10.4* 9.9*   HCT 30.7* 29.8*    237     CMP:   Recent Labs   Lab 03/27/20  0411 03/28/20  0406    138   K 3.5 4.1    107   CO2 25 25   * 116*   BUN 17 17   CREATININE 0.5 0.4*   CALCIUM 8.1* 8.3*   PROT 6.7 6.8   ALBUMIN 2.8* 2.6*   BILITOT 0.5 0.5   ALKPHOS 46* 66   AST 47* 91*    ALT 38 85*   ANIONGAP 9 6*   EGFRNONAA >60.0 >60.0

## 2020-03-21 NOTE — PLAN OF CARE
Problem: Infection  Goal: Infection Symptom Resolution  Outcome: Ongoing, Not Progressing     Problem: Adult Inpatient Plan of Care  Goal: Plan of Care Review  Outcome: Ongoing, Not Progressing  Goal: Patient-Specific Goal (Individualization)  Outcome: Ongoing, Not Progressing  Goal: Absence of Hospital-Acquired Illness or Injury  Outcome: Ongoing, Not Progressing  Goal: Optimal Comfort and Wellbeing  Outcome: Ongoing, Not Progressing  Goal: Readiness for Transition of Care  Outcome: Ongoing, Not Progressing  Goal: Rounds/Family Conference  Outcome: Ongoing, Not Progressing     Problem: Fall Injury Risk  Goal: Absence of Fall and Fall-Related Injury  Outcome: Ongoing, Not Progressing     Problem: Skin Injury Risk Increased  Goal: Skin Health and Integrity  Outcome: Ongoing, Not Progressing     Problem: Gas Exchange Impaired  Goal: Optimal Gas Exchange  Outcome: Ongoing, Not Progressing     Problem: Fluid Imbalance (Pneumonia)  Goal: Fluid Balance  Outcome: Ongoing, Not Progressing     Problem: Infection (Pneumonia)  Goal: Resolution of Infection Signs/Symptoms  Outcome: Ongoing, Not Progressing     Problem: Respiratory Compromise (Pneumonia)  Goal: Effective Oxygenation and Ventilation  Outcome: Ongoing, Not Progressing

## 2020-03-21 NOTE — NURSING
I spoke to lab tech today, who confirmed COVID-19 test was done in the E.R. on 03/19/20, as ordered by Dr. Singh, and was sent to [facility]. We are still waiting on results.

## 2020-03-21 NOTE — PLAN OF CARE
Problem: Infection  Goal: Infection Symptom Resolution  Outcome: Ongoing, Progressing     Problem: Adult Inpatient Plan of Care  Goal: Plan of Care Review  Outcome: Ongoing, Progressing     Problem: Fall Injury Risk  Goal: Absence of Fall and Fall-Related Injury  Outcome: Ongoing, Progressing     Problem: Skin Injury Risk Increased  Goal: Skin Health and Integrity  Outcome: Ongoing, Progressing

## 2020-03-21 NOTE — PLAN OF CARE
03/20/20 2002   Patient Assessment/Suction   Level of Consciousness (AVPU) responds to voice   Respiratory Effort Normal;Unlabored   Expansion/Accessory Muscles/Retractions no use of accessory muscles   All Lung Fields Breath Sounds diminished   Rhythm/Pattern, Respiratory unlabored   PRE-TX-O2   O2 Device (Oxygen Therapy) nasal cannula   Flow (L/min) 2   SpO2 98 %   Pulse 68   Resp 18   Aerosol Therapy   $ Aerosol Therapy Charges Aerosol Treatment   Daily Review of Necessity (SVN) completed   Respiratory Treatment Status (SVN) given   Treatment Route (SVN) mask   Patient Position (SVN) HOB elevated   Post Treatment Assessment (SVN) breath sounds unchanged   Signs of Intolerance (SVN) none   Breath Sounds Post-Respiratory Treatment   Post-treatment Heart Rate (beats/min) 63   Post-treatment Resp Rate (breaths/min) 18   Respiratory Evaluation   $ Care Plan Tech Time 15 min   Evaluation For Transfer

## 2020-03-22 ENCOUNTER — ANESTHESIA (OUTPATIENT)
Dept: INTENSIVE CARE | Facility: HOSPITAL | Age: 70
DRG: 208 | End: 2020-03-22
Payer: MEDICARE

## 2020-03-22 ENCOUNTER — ANESTHESIA EVENT (OUTPATIENT)
Dept: INTENSIVE CARE | Facility: HOSPITAL | Age: 70
DRG: 208 | End: 2020-03-22
Payer: MEDICARE

## 2020-03-22 PROBLEM — E87.6 HYPOKALEMIA: Status: ACTIVE | Noted: 2020-03-22

## 2020-03-22 PROBLEM — J96.01 ACUTE RESPIRATORY FAILURE WITH HYPOXIA: Status: ACTIVE | Noted: 2020-03-22

## 2020-03-22 PROBLEM — D64.9 ANEMIA: Status: ACTIVE | Noted: 2020-03-22

## 2020-03-22 LAB
ALLENS TEST: ABNORMAL
ALLENS TEST: ABNORMAL
ANION GAP SERPL CALC-SCNC: 13 MMOL/L (ref 8–16)
APTT PPP: 28.1 SEC (ref 23.6–33.3)
BACTERIA THROAT CULT: NORMAL
BACTERIA THROAT CULT: NORMAL
BACTERIA UR CULT: NO GROWTH
BASOPHILS # BLD AUTO: 0.01 K/UL (ref 0–0.2)
BASOPHILS NFR BLD: 0.2 % (ref 0–1.9)
BUN SERPL-MCNC: 20 MG/DL (ref 8–23)
CA-I BLDV-SCNC: 1.12 MMOL/L (ref 1.06–1.42)
CALCIUM SERPL-MCNC: 8.5 MG/DL (ref 8.7–10.5)
CHLORIDE SERPL-SCNC: 108 MMOL/L (ref 95–110)
CO2 SERPL-SCNC: 20 MMOL/L (ref 23–29)
CREAT SERPL-MCNC: 0.6 MG/DL (ref 0.5–1.4)
CRP SERPL-MCNC: 7.64 MG/DL (ref 0–0.75)
DELSYS: ABNORMAL
DELSYS: ABNORMAL
DIFFERENTIAL METHOD: ABNORMAL
EOSINOPHIL # BLD AUTO: 0 K/UL (ref 0–0.5)
EOSINOPHIL NFR BLD: 0 % (ref 0–8)
ERYTHROCYTE [DISTWIDTH] IN BLOOD BY AUTOMATED COUNT: 12.3 % (ref 11.5–14.5)
ERYTHROCYTE [SEDIMENTATION RATE] IN BLOOD BY WESTERGREN METHOD: 16 MM/H
EST. GFR  (AFRICAN AMERICAN): >60 ML/MIN/1.73 M^2
EST. GFR  (NON AFRICAN AMERICAN): >60 ML/MIN/1.73 M^2
FIO2: 40
FLOW: 2
GLUCOSE SERPL-MCNC: 102 MG/DL (ref 70–110)
GLUCOSE SERPL-MCNC: 103 MG/DL (ref 70–110)
GLUCOSE SERPL-MCNC: 109 MG/DL (ref 70–110)
HCO3 UR-SCNC: 23.2 MMOL/L (ref 24–28)
HCO3 UR-SCNC: 26.1 MMOL/L (ref 24–28)
HCT VFR BLD AUTO: 32.5 % (ref 40–54)
HCT VFR BLD CALC: 31 %PCV (ref 36–54)
HCT VFR BLD CALC: 32 %PCV (ref 36–54)
HGB BLD-MCNC: 10.7 G/DL (ref 14–18)
IMM GRANULOCYTES # BLD AUTO: 0.01 K/UL (ref 0–0.04)
IMM GRANULOCYTES NFR BLD AUTO: 0.2 % (ref 0–0.5)
INR PPP: 1.1
LACTATE SERPL-SCNC: 1.2 MMOL/L (ref 0.5–1.9)
LYMPHOCYTES # BLD AUTO: 0.9 K/UL (ref 1–4.8)
LYMPHOCYTES NFR BLD: 18.1 % (ref 18–48)
MAGNESIUM SERPL-MCNC: 2.1 MG/DL (ref 1.6–2.6)
MCH RBC QN AUTO: 31.8 PG (ref 27–31)
MCHC RBC AUTO-ENTMCNC: 32.9 G/DL (ref 32–36)
MCV RBC AUTO: 96 FL (ref 82–98)
MODE: ABNORMAL
MODE: ABNORMAL
MONOCYTES # BLD AUTO: 0.4 K/UL (ref 0.3–1)
MONOCYTES NFR BLD: 8.3 % (ref 4–15)
NEUTROPHILS # BLD AUTO: 3.4 K/UL (ref 1.8–7.7)
NEUTROPHILS NFR BLD: 73.2 % (ref 38–73)
NRBC BLD-RTO: 0 /100 WBC
PCO2 BLDA: 33.9 MMHG (ref 35–45)
PCO2 BLDA: 38.3 MMHG (ref 35–45)
PEEP: 7
PH SMN: 7.44 [PH] (ref 7.35–7.45)
PH SMN: 7.44 [PH] (ref 7.35–7.45)
PHOSPHATE SERPL-MCNC: 3.6 MG/DL (ref 2.7–4.5)
PLATELET # BLD AUTO: 154 K/UL (ref 150–350)
PMV BLD AUTO: 10.6 FL (ref 9.2–12.9)
PO2 BLDA: 112 MMHG (ref 80–100)
PO2 BLDA: 61 MMHG (ref 80–100)
POC BE: -1 MMOL/L
POC BE: 2 MMOL/L
POC IONIZED CALCIUM: 1.19 MMOL/L (ref 1.06–1.42)
POC IONIZED CALCIUM: 1.2 MMOL/L (ref 1.06–1.42)
POC SATURATED O2: 92 % (ref 95–100)
POC SATURATED O2: 99 % (ref 95–100)
POC TCO2: 24 MMOL/L (ref 23–27)
POC TCO2: 27 MMOL/L (ref 23–27)
POTASSIUM BLD-SCNC: 3.1 MMOL/L (ref 3.5–5.1)
POTASSIUM BLD-SCNC: 3.4 MMOL/L (ref 3.5–5.1)
POTASSIUM SERPL-SCNC: 3.3 MMOL/L (ref 3.5–5.1)
PROTHROMBIN TIME: 14.1 SEC (ref 10.6–14.8)
RBC # BLD AUTO: 3.37 M/UL (ref 4.6–6.2)
SAMPLE: ABNORMAL
SAMPLE: ABNORMAL
SITE: ABNORMAL
SITE: ABNORMAL
SODIUM BLD-SCNC: 141 MMOL/L (ref 136–145)
SODIUM BLD-SCNC: 143 MMOL/L (ref 136–145)
SODIUM SERPL-SCNC: 141 MMOL/L (ref 136–145)
SP02: 94
TROPONIN I SERPL DL<=0.01 NG/ML-MCNC: <0.03 NG/ML
VT: 350
WBC # BLD AUTO: 4.69 K/UL (ref 3.9–12.7)

## 2020-03-22 PROCEDURE — 25000003 PHARM REV CODE 250

## 2020-03-22 PROCEDURE — 25000003 PHARM REV CODE 250: Performed by: INTERNAL MEDICINE

## 2020-03-22 PROCEDURE — 99291 PR CRITICAL CARE, E/M 30-74 MINUTES: ICD-10-PCS | Mod: ,,, | Performed by: INTERNAL MEDICINE

## 2020-03-22 PROCEDURE — 84132 ASSAY OF SERUM POTASSIUM: CPT

## 2020-03-22 PROCEDURE — 20000000 HC ICU ROOM

## 2020-03-22 PROCEDURE — 80048 BASIC METABOLIC PNL TOTAL CA: CPT

## 2020-03-22 PROCEDURE — 85014 HEMATOCRIT: CPT

## 2020-03-22 PROCEDURE — 82330 ASSAY OF CALCIUM: CPT

## 2020-03-22 PROCEDURE — 85730 THROMBOPLASTIN TIME PARTIAL: CPT

## 2020-03-22 PROCEDURE — 36600 WITHDRAWAL OF ARTERIAL BLOOD: CPT

## 2020-03-22 PROCEDURE — 31500 INSERT EMERGENCY AIRWAY: CPT

## 2020-03-22 PROCEDURE — 83605 ASSAY OF LACTIC ACID: CPT

## 2020-03-22 PROCEDURE — 85610 PROTHROMBIN TIME: CPT

## 2020-03-22 PROCEDURE — 36620 INSERTION CATHETER ARTERY: CPT

## 2020-03-22 PROCEDURE — 83735 ASSAY OF MAGNESIUM: CPT

## 2020-03-22 PROCEDURE — 87070 CULTURE OTHR SPECIMN AEROBIC: CPT

## 2020-03-22 PROCEDURE — 86140 C-REACTIVE PROTEIN: CPT

## 2020-03-22 PROCEDURE — 94640 AIRWAY INHALATION TREATMENT: CPT

## 2020-03-22 PROCEDURE — 37799 UNLISTED PX VASCULAR SURGERY: CPT

## 2020-03-22 PROCEDURE — 85041 AUTOMATED RBC COUNT: CPT

## 2020-03-22 PROCEDURE — 63600175 PHARM REV CODE 636 W HCPCS: Performed by: NURSE PRACTITIONER

## 2020-03-22 PROCEDURE — 25000242 PHARM REV CODE 250 ALT 637 W/ HCPCS: Performed by: NURSE PRACTITIONER

## 2020-03-22 PROCEDURE — 94002 VENT MGMT INPAT INIT DAY: CPT

## 2020-03-22 PROCEDURE — 94761 N-INVAS EAR/PLS OXIMETRY MLT: CPT

## 2020-03-22 PROCEDURE — S0028 INJECTION, FAMOTIDINE, 20 MG: HCPCS | Performed by: INTERNAL MEDICINE

## 2020-03-22 PROCEDURE — 36415 COLL VENOUS BLD VENIPUNCTURE: CPT

## 2020-03-22 PROCEDURE — 27000221 HC OXYGEN, UP TO 24 HOURS

## 2020-03-22 PROCEDURE — 84100 ASSAY OF PHOSPHORUS: CPT

## 2020-03-22 PROCEDURE — 85025 COMPLETE CBC W/AUTO DIFF WBC: CPT

## 2020-03-22 PROCEDURE — 99900026 HC AIRWAY MAINTENANCE (STAT)

## 2020-03-22 PROCEDURE — 84295 ASSAY OF SERUM SODIUM: CPT

## 2020-03-22 PROCEDURE — 63600175 PHARM REV CODE 636 W HCPCS: Performed by: INTERNAL MEDICINE

## 2020-03-22 PROCEDURE — 84484 ASSAY OF TROPONIN QUANT: CPT

## 2020-03-22 PROCEDURE — 87205 SMEAR GRAM STAIN: CPT

## 2020-03-22 PROCEDURE — 99900035 HC TECH TIME PER 15 MIN (STAT)

## 2020-03-22 PROCEDURE — 99291 CRITICAL CARE FIRST HOUR: CPT | Mod: ,,, | Performed by: INTERNAL MEDICINE

## 2020-03-22 PROCEDURE — 82803 BLOOD GASES ANY COMBINATION: CPT

## 2020-03-22 PROCEDURE — 21400001 HC TELEMETRY ROOM

## 2020-03-22 RX ORDER — LANOLIN ALCOHOL/MO/W.PET/CERES
800 CREAM (GRAM) TOPICAL
Status: DISCONTINUED | OUTPATIENT
Start: 2020-03-22 | End: 2020-03-25

## 2020-03-22 RX ORDER — CHLORHEXIDINE GLUCONATE ORAL RINSE 1.2 MG/ML
15 SOLUTION DENTAL 2 TIMES DAILY
Status: DISCONTINUED | OUTPATIENT
Start: 2020-03-22 | End: 2020-03-29

## 2020-03-22 RX ORDER — FAMOTIDINE 10 MG/ML
20 INJECTION INTRAVENOUS 2 TIMES DAILY
Status: DISCONTINUED | OUTPATIENT
Start: 2020-03-22 | End: 2020-03-29

## 2020-03-22 RX ORDER — IPRATROPIUM BROMIDE AND ALBUTEROL SULFATE 2.5; .5 MG/3ML; MG/3ML
3 SOLUTION RESPIRATORY (INHALATION) EVERY 6 HOURS PRN
Status: DISCONTINUED | OUTPATIENT
Start: 2020-03-22 | End: 2020-03-26

## 2020-03-22 RX ORDER — MUPIROCIN 20 MG/G
OINTMENT TOPICAL 2 TIMES DAILY
Status: COMPLETED | OUTPATIENT
Start: 2020-03-22 | End: 2020-03-26

## 2020-03-22 RX ORDER — PROPOFOL 10 MG/ML
5 INJECTION, EMULSION INTRAVENOUS CONTINUOUS
Status: DISCONTINUED | OUTPATIENT
Start: 2020-03-22 | End: 2020-03-25

## 2020-03-22 RX ORDER — SODIUM,POTASSIUM PHOSPHATES 280-250MG
2 POWDER IN PACKET (EA) ORAL
Status: DISCONTINUED | OUTPATIENT
Start: 2020-03-22 | End: 2020-03-25

## 2020-03-22 RX ORDER — NAPROXEN SODIUM 220 MG/1
81 TABLET, FILM COATED ORAL DAILY
Status: DISCONTINUED | OUTPATIENT
Start: 2020-03-22 | End: 2020-03-26

## 2020-03-22 RX ORDER — POTASSIUM CHLORIDE 20 MEQ/15ML
40 SOLUTION ORAL
Status: DISCONTINUED | OUTPATIENT
Start: 2020-03-22 | End: 2020-03-25

## 2020-03-22 RX ADMIN — PIPERACILLIN AND TAZOBACTAM 3.38 G: 3; .375 INJECTION, POWDER, FOR SOLUTION INTRAVENOUS at 09:03

## 2020-03-22 RX ADMIN — MUPIROCIN: 20 OINTMENT TOPICAL at 12:03

## 2020-03-22 RX ADMIN — POTASSIUM CHLORIDE 40 MEQ: 20 SOLUTION ORAL at 04:03

## 2020-03-22 RX ADMIN — HYDROXYCHLOROQUINE SULFATE 200 MG: 200 TABLET, FILM COATED ORAL at 12:03

## 2020-03-22 RX ADMIN — CHLORHEXIDINE GLUCONATE 15 ML: 1.2 RINSE ORAL at 12:03

## 2020-03-22 RX ADMIN — PROPOFOL 50 MCG/KG/MIN: 10 INJECTION, EMULSION INTRAVENOUS at 11:03

## 2020-03-22 RX ADMIN — IPRATROPIUM BROMIDE AND ALBUTEROL SULFATE 3 ML: .5; 3 SOLUTION RESPIRATORY (INHALATION) at 12:03

## 2020-03-22 RX ADMIN — MUPIROCIN: 20 OINTMENT TOPICAL at 09:03

## 2020-03-22 RX ADMIN — PROPOFOL 50 MCG/KG/MIN: 10 INJECTION, EMULSION INTRAVENOUS at 02:03

## 2020-03-22 RX ADMIN — CARBIDOPA AND LEVODOPA 1 TABLET: 25; 100 TABLET ORAL at 03:03

## 2020-03-22 RX ADMIN — FAMOTIDINE 20 MG: 10 INJECTION INTRAVENOUS at 09:03

## 2020-03-22 RX ADMIN — PROPOFOL 5 MCG/KG/MIN: 10 INJECTION, EMULSION INTRAVENOUS at 06:03

## 2020-03-22 RX ADMIN — SODIUM CHLORIDE: 0.9 INJECTION, SOLUTION INTRAVENOUS at 06:03

## 2020-03-22 RX ADMIN — HYDROXYCHLOROQUINE SULFATE 200 MG: 200 TABLET, FILM COATED ORAL at 09:03

## 2020-03-22 RX ADMIN — LISINOPRIL 10 MG: 10 TABLET ORAL at 09:03

## 2020-03-22 RX ADMIN — PIPERACILLIN AND TAZOBACTAM 3.38 G: 3; .375 INJECTION, POWDER, FOR SOLUTION INTRAVENOUS at 12:03

## 2020-03-22 RX ADMIN — PIPERACILLIN AND TAZOBACTAM 3.38 G: 3; .375 INJECTION, POWDER, FOR SOLUTION INTRAVENOUS at 04:03

## 2020-03-22 RX ADMIN — PROPOFOL 50 MCG/KG/MIN: 10 INJECTION, EMULSION INTRAVENOUS at 03:03

## 2020-03-22 RX ADMIN — AZITHROMYCIN MONOHYDRATE 500 MG: 500 INJECTION, POWDER, LYOPHILIZED, FOR SOLUTION INTRAVENOUS at 09:03

## 2020-03-22 RX ADMIN — CHLORHEXIDINE GLUCONATE 15 ML: 1.2 RINSE ORAL at 09:03

## 2020-03-22 RX ADMIN — CARBIDOPA AND LEVODOPA 1 TABLET: 25; 100 TABLET ORAL at 12:03

## 2020-03-22 RX ADMIN — ASPIRIN 81 MG 81 MG: 81 TABLET ORAL at 01:03

## 2020-03-22 RX ADMIN — CARBIDOPA AND LEVODOPA 1 TABLET: 25; 100 TABLET ORAL at 09:03

## 2020-03-22 RX ADMIN — PROPOFOL 50 MCG/KG/MIN: 10 INJECTION, EMULSION INTRAVENOUS at 12:03

## 2020-03-22 RX ADMIN — FAMOTIDINE 20 MG: 10 INJECTION INTRAVENOUS at 12:03

## 2020-03-22 RX ADMIN — IPRATROPIUM BROMIDE AND ALBUTEROL SULFATE 3 ML: .5; 3 SOLUTION RESPIRATORY (INHALATION) at 07:03

## 2020-03-22 NOTE — SUBJECTIVE & OBJECTIVE
Past Medical History:   Diagnosis Date    Diabetes mellitus     GERD (gastroesophageal reflux disease)     Hypertension     Stroke        History reviewed. No pertinent surgical history.    Review of patient's allergies indicates:  No Known Allergies    Family History     None        Tobacco Use    Smoking status: Never Smoker    Smokeless tobacco: Never Used   Substance and Sexual Activity    Alcohol use: Not on file    Drug use: Not on file    Sexual activity: Not on file         Review of Systems   Unable to perform ROS: Intubated     Objective:     Vital Signs (Most Recent):  Temp: 99.3 °F (37.4 °C) (03/22/20 0200)  Pulse: 80 (03/22/20 0739)  Resp: (!) 30 (03/22/20 0739)  BP: (!) 93/56 (03/22/20 0358)  SpO2: 98 % (03/22/20 0739) Vital Signs (24h Range):  Temp:  [98.2 °F (36.8 °C)-101 °F (38.3 °C)] 99.3 °F (37.4 °C)  Pulse:  [] 80  Resp:  [14-30] 30  SpO2:  [93 %-98 %] 98 %  BP: ()/(53-65) 93/56     Weight: 69.1 kg (152 lb 5.4 oz)  Body mass index is 25.35 kg/m².      Intake/Output Summary (Last 24 hours) at 3/22/2020 1024  Last data filed at 3/22/2020 0123  Gross per 24 hour   Intake 540 ml   Output 850 ml   Net -310 ml       Physical Exam   Constitutional: He appears well-developed and well-nourished. No distress.   Intubated, ventilated, sedated  No distress   HENT:   Head: Normocephalic and atraumatic.   Right Ear: External ear normal.   Left Ear: External ear normal.   Nose: Nose normal.   Mouth/Throat: Oropharynx is clear and moist.   intubated   Eyes: Pupils are equal, round, and reactive to light. EOM are normal.   Neck: Normal range of motion. Neck supple. No JVD present. No tracheal deviation present. No thyromegaly present.   Cardiovascular: Normal rate, regular rhythm, normal heart sounds and intact distal pulses. Exam reveals no gallop and no friction rub.   No murmur heard.  Pulmonary/Chest: Effort normal and breath sounds normal. No stridor. No respiratory distress. He has no  wheezes. He has no rales. He exhibits no tenderness.   + rhonchi  No acc m use   Abdominal: Soft. Bowel sounds are normal. He exhibits distension. There is no tenderness. There is no rebound and no guarding.   hyperactive   Genitourinary:   Genitourinary Comments: holbrook   Musculoskeletal: Normal range of motion. He exhibits no edema or tenderness.   Lymphadenopathy:     He has no cervical adenopathy.   Neurological: He has normal reflexes. No cranial nerve deficit.   Sedated, decreased movement  H/o parkinsons   Skin: He is not diaphoretic.   Psychiatric:   Not able to assess   Nursing note and vitals reviewed.      Vents:  Vent Mode: A/C (03/22/20 0521)  Ventilator Initiated: Yes (03/22/20 0255)  Set Rate: 16 BPM (03/22/20 0521)  Vt Set: 350 mL (03/22/20 0521)  Pressure Support: 0 cmH20 (03/22/20 0521)  PEEP/CPAP: 7 cmH20 (03/22/20 0521)  Oxygen Concentration (%): 30 (03/22/20 0739)  Peak Airway Pressure: 19 cmH2O (03/22/20 0521)  Plateau Pressure: 0 cmH20 (03/22/20 0521)  Total Ve: 10.6 mL (03/22/20 0521)  F/VT Ratio<105 (RSBI): (!) 75.68 (03/22/20 0521)    Lines/Drains/Airways     Drain                 Urethral Catheter 03/19/20 2102 Straight-tip 16 Fr. 2 days          Airway                 Airway - Non-Surgical 03/22/20 0230 Endotracheal Tube less than 1 day          Arterial Line                 Arterial Line 03/22/20 0328 Right Radial less than 1 day          Peripheral Intravenous Line                 Peripheral IV - Single Lumen 03/19/20 1830 20 G Left Forearm 2 days         Peripheral IV - Single Lumen 03/19/20 1925 18 G Right Upper Arm 2 days                Significant Labs:    CBC/Anemia Profile:  Recent Labs   Lab 03/21/20  0347 03/22/20  0213 03/22/20  0306 03/22/20  0358   WBC 6.79  --  4.69  --    HGB 11.1*  --  10.7*  --    HCT 33.6* 31* 32.5* 32*     --  154  --    MCV 97  --  96  --    RDW 12.2  --  12.3  --         Chemistries:  Recent Labs   Lab 03/21/20  0347 03/22/20  0306    942    K 3.5 3.3*   * 108   CO2 23 20*   BUN 19 20   CREATININE 0.7 0.6   CALCIUM 8.6* 8.5*   MG 2.2 2.1   PHOS 3.2 3.6       Lactic Acid:   Recent Labs   Lab 03/22/20  0306   LACTATE 1.2     Troponin:   Recent Labs   Lab 03/22/20  0306   TROPONINI <0.030     All pertinent labs within the past 24 hours have been reviewed.    Recent Labs     03/22/20  0358   PH 7.441   PCO2 38.3   PO2 112*   HCO3 26.1   POCSATURATED 99   BE 2     Microbiology Results (last 7 days)     Procedure Component Value Units Date/Time    Strep A culture, throat [[id number]] Collected:  03/19/20 2000    Order Status:  Completed Specimen:  Throat Updated:  03/22/20 0843     Strep A Culture No significant growth      No  Group A  Streptococcus isolated    Urine culture [[id number]] Collected:  03/19/20 2103    Order Status:  Completed Specimen:  Urine, Clean Catch Updated:  03/22/20 0749     Urine Culture, Routine No growth    Narrative:       Indicated criteria for high risk culture:->Other  Other (specify):->possible sepsis    Blood culture x two cultures. Draw prior to antibiotics. [[id number]] Collected:  03/19/20 2005    Order Status:  Completed Specimen:  Blood from Peripheral, Antecubital, Right Updated:  03/21/20 2232     Blood Culture, Routine No Growth to date      No Growth to date      No Growth to date    Narrative:       Aerobic and anaerobic    Blood culture x two cultures. Draw prior to antibiotics. [[id number]] Collected:  03/19/20 1930    Order Status:  Completed Specimen:  Blood from Peripheral, Upper Arm, Right Updated:  03/21/20 2232     Blood Culture, Routine No Growth to date      No Growth to date      No Growth to date    Narrative:       Aerobic and anaerobic    MRSA Screen by PCR [[id number]] Collected:  03/19/20 2210    Order Status:  Completed Specimen:  Nasopharyngeal Swab from Nasal Updated:  03/20/20 0033     MRSA SCREEN BY PCR Negative    Throat Screen, Rapid [[id number]] Collected:  03/19/20 2000    Order Status:   Completed Specimen:  Throat Updated:  03/19/20 2041     Rapid Strep A Screen Negative     Comment: See Micro for reflexed Strep culture.                 Significant Imaging:   I have reviewed and interpreted all pertinent imaging results/findings within the past 24 hours.     XR CHEST AP PORTABLE    CLINICAL HISTORY:  ET tube placement;    FINDINGS:  Portable chest at 02:37 is compared to a prior study dated 03/20/2020 shows normal cardiomediastinal silhouette.    There is placement of an endotracheal tube with the tip approximately 2.5 cm above the dominique.    There is hypoinflation with progression of the interstitial and alveolar opacities in both lungs predominantly in the right upper lobe.  There is relatively sparing of the lung bases.  Pulmonary vasculature is normal. No acute osseous abnormality.      Impression       Progression of the interstitial and alveolar opacities in both lungs predominantly in the upper and mid lungs.  Differential includes viral or atypical pneumonia versus edema      Electronically signed by: Roz Eugene MD  Date: 03/22/2020  Time: 07:10

## 2020-03-22 NOTE — NURSING
Upon entering pt's room at 0155, noted decline in pt's resp status- pt's Resp rate has increased to 40-48 and breathing is very labored. VS: T 99.3 Axillary, /56, HR , O2 sat 94% on 4L NC. Notified William Respiratory therapist(arrived bedside at approx 0200), charge nurse, Dr. Ball(arrived bedside at approx 0205); order obtained to move pt to ICU; Pt being transferred to ICU at this time.

## 2020-03-22 NOTE — PLAN OF CARE
03/21/20 2023   Patient Assessment/Suction   Level of Consciousness (AVPU) alert   Respiratory Effort Normal;Unlabored   Expansion/Accessory Muscles/Retractions no use of accessory muscles   All Lung Fields Breath Sounds rhonchi;equal bilaterally;diminished   Rhythm/Pattern, Respiratory unlabored   Cough Frequency frequent   Cough Type loose;nonproductive   PRE-TX-O2   O2 Device (Oxygen Therapy) nasal cannula   Flow (L/min) 2   SpO2 96 %   Pulse Oximetry Type Intermittent   $ Pulse Oximetry - Multiple Charge Pulse Oximetry - Multiple   Pulse 80   Resp 20   Aerosol Therapy   $ Aerosol Therapy Charges Aerosol Treatment   Daily Review of Necessity (SVN) completed   Respiratory Treatment Status (SVN) given   Treatment Route (SVN) mask   Patient Position (SVN) HOB elevated   Post Treatment Assessment (SVN) breath sounds unchanged   Signs of Intolerance (SVN) none   Breath Sounds Post-Respiratory Treatment   Post-treatment Heart Rate (beats/min) 72   Post-treatment Resp Rate (breaths/min) 20   Respiratory Evaluation   $ Care Plan Tech Time 15 min   Evaluation For Re-Eval 3 day

## 2020-03-22 NOTE — NURSING
Notified Pt's sister, Veronica, regarding pt's transfer to ICU, room 3026; pt's ICU nurse, Cira, is resuming pt's care; Veronica verbalizes understanding.

## 2020-03-22 NOTE — RESPIRATORY THERAPY
Pt orally intubated per md. Pt intubated via 8.0mm ett and secured at 24cm donavan at lip. Pt placed on pb 840 vent at settings as charted. Pt tolerated well with no adverse reactions noted. Pt appears to be resting comfortably with no apparent distress noted.

## 2020-03-22 NOTE — CARE UPDATE
This note also relates to the following rows which could not be included:  Oxygen Concentration (%) - Cannot attach notes to unvalidated device data  SpO2 - Cannot attach notes to unvalidated device data  Pulse - Cannot attach notes to unvalidated device data  Resp - Cannot attach notes to unvalidated device data  Ventilation Type - Cannot attach notes to unvalidated device data  Vent Mode - Cannot attach notes to unvalidated device data  Set Rate - Cannot attach notes to unvalidated device data  Vt Set - Cannot attach notes to unvalidated device data  PEEP/CPAP - Cannot attach notes to unvalidated device data  Pressure Support - Cannot attach notes to unvalidated device data  Waveform - Cannot attach notes to unvalidated device data  Peak Flow - Cannot attach notes to unvalidated device data  Plateau Set/Insp. Hold (sec) - Cannot attach notes to unvalidated device data  Trigger Sensitivity Flow/I-Trigger - Cannot attach notes to unvalidated device data  Resp Rate Total - Cannot attach notes to unvalidated device data  Peak Airway Pressure - Cannot attach notes to unvalidated device data  Mean Airway Pressure - Cannot attach notes to unvalidated device data  Plateau Pressure - Cannot attach notes to unvalidated device data  Exhaled Vt - Cannot attach notes to unvalidated device data  Total Ve - Cannot attach notes to unvalidated device data  I:E Ratio Measured - Cannot attach notes to unvalidated device data  Resp Rate High Alarm - Cannot attach notes to unvalidated device data  Press High Alarm - Cannot attach notes to unvalidated device data  Apnea Rate - Cannot attach notes to unvalidated device data  Apnea Volume (mL) - Cannot attach notes to unvalidated device data  Apnea Oxygen Concentration  - Cannot attach notes to unvalidated device data  Apnea Flow Rate (L/min) - Cannot attach notes to unvalidated device data  T Apnea - Cannot attach notes to unvalidated device data       03/22/20 0741   Patient  Assessment/Suction   Level of Consciousness (AVPU) unresponsive   Respiratory Effort Normal;Unlabored   Expansion/Accessory Muscles/Retractions no use of accessory muscles;no retractions;expansion symmetric   All Lung Fields Breath Sounds equal bilaterally;diminished   Rhythm/Pattern, Respiratory assisted mechanically   Cough Frequency infrequent;with stimulation   Cough Type assisted   Suction Method tracheal;oral   $ Suction Charges Inline Suction Procedure Stat Charge   Secretions Amount moderate   Secretions Color white;yellow   Secretions Characteristics thin   Aerosol Therapy   $ Aerosol Therapy Charges Aerosol Treatment   Daily Review of Necessity (SVN) completed   Respiratory Treatment Status (SVN) given   Treatment Route (SVN) in-line;ventilator   Patient Position (SVN) HOB elevated   Post Treatment Assessment (SVN) breath sounds unchanged   Signs of Intolerance (SVN) none   Breath Sounds Post-Respiratory Treatment   Throughout All Fields Post-Treatment All Fields   Throughout All Fields Post-Treatment diminished   Post-treatment Heart Rate (beats/min) 84   Post-treatment Resp Rate (breaths/min) 27        Airway - Non-Surgical 03/22/20 0230 Endotracheal Tube   Placement Date/Time: 03/22/20 0230   Present Prior to Hospital Arrival?: No  Method of Intubation: Direct laryngoscopy  Inserted by: MD  Staff/Resident Name(s): DR WITT  Airway Device: Endotracheal Tube  Mask Ventilation: Easy  Airway Device Size: ...   Secured at 24 cm   Measured At Lips   Secured Location Left   Secured by Commercial tube byrnes   Bite Block none   Site Condition Cool;Dry   Status Secured   Site Assessment Dry;Clean   Vent Select   Charged w/in last 24h YES   Preset Conventional Ventilator Settings   Vent ID [id number]   Vent Type    Conventional Ventilator Alarms   Alarms On Y   Respiratory Evaluation   $ Care Plan Tech Time 15 min   oral care done.

## 2020-03-22 NOTE — PROGRESS NOTES
Formerly [facility], later [facility] Medicine  Progress Note    Patient Name: Mukehs Addison  MRN: [id number]  Patient Class: IP- Inpatient   Admission Date: 3/19/2020  Length of Stay: 2 days  Attending Physician: Elie Mcghee MD  Primary Care Provider: Primary Doctor No        Subjective:     Principal Problem:Acute hypoxemic respiratory failure      Interval History:  Developed acute onset severe respiratory distress overnight; seems like this happened after he was fed.  Reviewed documentation from overnight physician.  Patient was subsequently intubated for respiratory distress.  Suspect aspiration related pneumonitis.  Currently intubated and sedated in ICU.  On 30% FiO2. CXR with progressive interstitial and alveolar opacities bilaterally predominantly located over the upper and mid lungs.    Review of Systems   Unable to perform ROS: Intubated     Objective:     Vital Signs (Most Recent):  Temp: 99.3 °F (37.4 °C) (03/22/20 0200)  Pulse: 80 (03/22/20 0739)  Resp: (!) 30 (03/22/20 0739)  BP: (!) 93/56 (03/22/20 0358)  SpO2: 98 % (03/22/20 0739) Vital Signs (24h Range):  Temp:  [98.2 °F (36.8 °C)-101 °F (38.3 °C)] 99.3 °F (37.4 °C)  Pulse:  [] 80  Resp:  [14-30] 30  SpO2:  [93 %-98 %] 98 %  BP: ()/(53-65) 93/56     Weight: 69.1 kg (152 lb 5.4 oz)  Body mass index is 25.35 kg/m².    Intake/Output Summary (Last 24 hours) at 3/22/2020 1034  Last data filed at 3/22/2020 0123  Gross per 24 hour   Intake 540 ml   Output 850 ml   Net -310 ml      Physical Exam   Constitutional: He is sedated and intubated.   Frail  male in no acute distress   HENT:   Head: Normocephalic and atraumatic.   Eyes: Conjunctivae are normal. No scleral icterus.   Neck: Neck supple. No thyromegaly present.   Cardiovascular: Normal rate, regular rhythm, normal heart sounds and intact distal pulses.   Pulmonary/Chest: He is intubated.   Scattered coarse rhonchi (R>L)   Abdominal: Soft. Bowel sounds are normal. He exhibits no  distension.   Musculoskeletal: He exhibits deformity. He exhibits no edema.   Mild contractures to upper and lower extremities   Neurological: He displays no atrophy.   Sedated   Skin: Skin is warm and dry. Capillary refill takes less than 2 seconds. He is not diaphoretic.   Psychiatric:   Unable to assess   Nursing note and vitals reviewed.      Significant Labs:   CBC:   Recent Labs   Lab 03/21/20  0347 03/22/20  0213 03/22/20  0306 03/22/20  0358   WBC 6.79  --  4.69  --    HGB 11.1*  --  10.7*  --    HCT 33.6* 31* 32.5* 32*     --  154  --      CMP:   Recent Labs   Lab 03/21/20 0347 03/22/20  0306    141   K 3.5 3.3*   * 108   CO2 23 20*   * 103   BUN 19 20   CREATININE 0.7 0.6   CALCIUM 8.6* 8.5*   ANIONGAP 8 13   EGFRNONAA >60.0 >60.0     Procedure Component Value Units Date/Time   X-Ray Chest AP Portable [[id number]] Resulted: 03/22/20 0710   Order Status: Completed Updated: 03/22/20 0713   Narrative:     EXAMINATION:  XR CHEST AP PORTABLE    CLINICAL HISTORY:  ET tube placement;    FINDINGS:  Portable chest at 02:37 is compared to a prior study dated 03/20/2020 shows normal cardiomediastinal silhouette.    There is placement of an endotracheal tube with the tip approximately 2.5 cm above the dominique.    There is hypoinflation with progression of the interstitial and alveolar opacities in both lungs predominantly in the right upper lobe.  There is relatively sparing of the lung bases.  Pulmonary vasculature is normal. No acute osseous abnormality.   Impression:       Progression of the interstitial and alveolar opacities in both lungs predominantly in the upper and mid lungs.  Differential includes viral or atypical pneumonia versus edema      Electronically signed by: Roz Eugene MD  Date: 03/22/2020  Time: 07:10         Microbiology Results (last 7 days)     Procedure Component Value Units Date/Time    Culture, Respiratory with Gram Stain [[id number]]     Order Status:  No result  Specimen:  Respiratory from Tracheal Aspirate     Strep A culture, throat [[id number]] Collected:  03/19/20 2000    Order Status:  Completed Specimen:  Throat Updated:  03/22/20 0843     Strep A Culture No significant growth      No  Group A  Streptococcus isolated    Urine culture [[id number]] Collected:  03/19/20 2103    Order Status:  Completed Specimen:  Urine, Clean Catch Updated:  03/22/20 0749     Urine Culture, Routine No growth    Narrative:       Indicated criteria for high risk culture:->Other  Other (specify):->possible sepsis    Blood culture x two cultures. Draw prior to antibiotics. [[id number]] Collected:  03/19/20 2005    Order Status:  Completed Specimen:  Blood from Peripheral, Antecubital, Right Updated:  03/21/20 2232     Blood Culture, Routine No Growth to date      No Growth to date      No Growth to date    Narrative:       Aerobic and anaerobic    Blood culture x two cultures. Draw prior to antibiotics. [[id number]] Collected:  03/19/20 1930    Order Status:  Completed Specimen:  Blood from Peripheral, Upper Arm, Right Updated:  03/21/20 2232     Blood Culture, Routine No Growth to date      No Growth to date      No Growth to date    Narrative:       Aerobic and anaerobic    MRSA Screen by PCR [[id number]] Collected:  03/19/20 2210    Order Status:  Completed Specimen:  Nasopharyngeal Swab from Nasal Updated:  03/20/20 0033     MRSA SCREEN BY PCR Negative    Throat Screen, Rapid [[id number]] Collected:  03/19/20 2000    Order Status:  Completed Specimen:  Throat Updated:  03/19/20 2041     Rapid Strep A Screen Negative     Comment: See Micro for reflexed Strep culture.               Assessment/Plan:      Active Hospital Problems    Diagnosis  POA    *Acute hypoxemic respiratory failure [J96.01]  Yes    Suspected Covid-19 Virus Infection [R68.89]  Yes     Priority: 2     Hypokalemia [E87.6]  No    Anemia [D64.9]  Yes    Pneumonia of both lungs due to infectious organism [J18.9]  Yes     Pharyngeal dysphagia [R13.13]  Yes    CVA (cerebral vascular accident) [I63.9]  Yes     Chronic    Gait instability [R26.81]  Yes    Aspiration pneumonia [J69.0]  Yes    HTN (hypertension) [I10]  Yes      Resolved Hospital Problems   No resolved problems to display.       Plan:  ICU care; mechanical ventilation for acute respiratory failure   Antibiotics broadened last night to piperacillin-tazobactam; will formally consult ID for further guidance   Continue hydroxychloroquine for suspected COVID-19; PCR is currently pending  Recent MBSS from 12/2019 with severe pharyngeal dysphagia  Strongly suspect pt is at high risk for aspiration even with modified diet. Will discuss with family about feeding tube   Air bone and droplet isolation precautions for suspected COVID-19  Continue home medications for chronic medical conditions  QTc monitoring while on hydroxychloroquine. Will hold home mirtazapine for now      VTE Risk Mitigation (From admission, onward)         Ordered     enoxaparin injection 40 mg  Every 24 hours (non-standard times)      03/20/20 1439     IP VTE LOW RISK PATIENT  Once      03/20/20 0247     Place sequential compression device  Until discontinued      03/20/20 0247                      Elie Mcghee MD  Department of Hospital Medicine   Formerly [facility]

## 2020-03-22 NOTE — NURSING TRANSFER
Nursing Transfer Note      3/22/2020     Transfer from RM 3011 to room 3026.    Transfer via hospital bed.     Transfer with oxygen.     Medicines sent: yes    Chart send with patient: YES    Notified: notified Respiratory therapist, charge nurse, Dr. Ball, attempted to notify pt's sister Veronica-no answer, message left on voicemail to please call nurses station.    Bedside report given at 0230 to Cira Bass RN in ICU resuming pt's care.

## 2020-03-22 NOTE — CONSULTS
Formerly [facility]  Pulmonology  Consult Note    Patient Name: Mukesh Addison  MRN: [id number]  Admission Date: 3/19/2020  Hospital Length of Stay: 2 days  Code Status: Full Code  Attending Physician: Elie Mcghee MD  Primary Care Provider: Primary Doctor No   Principal Problem: Acute hypoxemic respiratory failure    Inpatient consult to Pulmonology  Consult performed by: You Taylor MD  Consult ordered by: Karri Ball MD        Subjective:     HPI:  70 yo male admitted from [place] with suspected aspiration pneumonia (also being ruled out for Covid).  Last PM after eating he developed respiratory distress and required intubation/ventilation and I was asked to see in consultation.  He is currently sedated and ventilated and not able to provide any history.  Chart has been reviewed and case discussed with Dr Ball this AM.      Past Medical History:   Diagnosis Date    Diabetes mellitus     GERD (gastroesophageal reflux disease)     Hypertension     Stroke        History reviewed. No pertinent surgical history.    Review of patient's allergies indicates:  No Known Allergies    Family History     None        Tobacco Use    Smoking status: Never Smoker    Smokeless tobacco: Never Used   Substance and Sexual Activity    Alcohol use: Not on file    Drug use: Not on file    Sexual activity: Not on file         Review of Systems   Unable to perform ROS: Intubated     Objective:     Vital Signs (Most Recent):  Temp: 99.3 °F (37.4 °C) (03/22/20 0200)  Pulse: 80 (03/22/20 0739)  Resp: (!) 30 (03/22/20 0739)  BP: (!) 93/56 (03/22/20 0358)  SpO2: 98 % (03/22/20 0739) Vital Signs (24h Range):  Temp:  [98.2 °F (36.8 °C)-101 °F (38.3 °C)] 99.3 °F (37.4 °C)  Pulse:  [] 80  Resp:  [14-30] 30  SpO2:  [93 %-98 %] 98 %  BP: ()/(53-65) 93/56     Weight: 69.1 kg (152 lb 5.4 oz)  Body mass index is 25.35 kg/m².      Intake/Output Summary (Last 24 hours) at 3/22/2020 1024  Last data filed at  3/22/2020 0123  Gross per 24 hour   Intake 540 ml   Output 850 ml   Net -310 ml       Physical Exam   Constitutional: He appears well-developed and well-nourished. No distress.   Intubated, ventilated, sedated  No distress   HENT:   Head: Normocephalic and atraumatic.   Right Ear: External ear normal.   Left Ear: External ear normal.   Nose: Nose normal.   Mouth/Throat: Oropharynx is clear and moist.   intubated   Eyes: Pupils are equal, round, and reactive to light. EOM are normal.   Neck: Normal range of motion. Neck supple. No JVD present. No tracheal deviation present. No thyromegaly present.   Cardiovascular: Normal rate, regular rhythm, normal heart sounds and intact distal pulses. Exam reveals no gallop and no friction rub.   No murmur heard.  Pulmonary/Chest: Effort normal and breath sounds normal. No stridor. No respiratory distress. He has no wheezes. He has no rales. He exhibits no tenderness.   + rhonchi  No acc m use   Abdominal: Soft. Bowel sounds are normal. He exhibits distension. There is no tenderness. There is no rebound and no guarding.   hyperactive   Genitourinary:   Genitourinary Comments: [place]   Musculoskeletal: Normal range of motion. He exhibits no edema or tenderness.   Lymphadenopathy:     He has no cervical adenopathy.   Neurological: He has normal reflexes. No cranial nerve deficit.   Sedated, decreased movement  H/o parkinsons   Skin: He is not diaphoretic.   Psychiatric:   Not able to assess   Nursing note and vitals reviewed.      Vents:  Vent Mode: A/C (03/22/20 0521)  Ventilator Initiated: Yes (03/22/20 0255)  Set Rate: 16 BPM (03/22/20 0521)  Vt Set: 350 mL (03/22/20 0521)  Pressure Support: 0 cmH20 (03/22/20 0521)  PEEP/CPAP: 7 cmH20 (03/22/20 0521)  Oxygen Concentration (%): 30 (03/22/20 0739)  Peak Airway Pressure: 19 cmH2O (03/22/20 0521)  Plateau Pressure: 0 cmH20 (03/22/20 0521)  Total Ve: 10.6 mL (03/22/20 0521)  F/VT Ratio<105 (RSBI): (!) 75.68 (03/22/20  0521)    Lines/Drains/Airways     Drain                 Urethral Catheter 03/19/20 2102 Straight-tip 16 Fr. 2 days          Airway                 Airway - Non-Surgical 03/22/20 0230 Endotracheal Tube less than 1 day          Arterial Line                 Arterial Line 03/22/20 0328 Right Radial less than 1 day          Peripheral Intravenous Line                 Peripheral IV - Single Lumen 03/19/20 1830 20 G Left Forearm 2 days         Peripheral IV - Single Lumen 03/19/20 1925 18 G Right Upper Arm 2 days                Significant Labs:    CBC/Anemia Profile:  Recent Labs   Lab 03/21/20  0347 03/22/20  0213 03/22/20  0306 03/22/20  0358   WBC 6.79  --  4.69  --    HGB 11.1*  --  10.7*  --    HCT 33.6* 31* 32.5* 32*     --  154  --    MCV 97  --  96  --    RDW 12.2  --  12.3  --         Chemistries:  Recent Labs   Lab 03/21/20  0347 03/22/20  0306    141   K 3.5 3.3*   * 108   CO2 23 20*   BUN 19 20   CREATININE 0.7 0.6   CALCIUM 8.6* 8.5*   MG 2.2 2.1   PHOS 3.2 3.6       Lactic Acid:   Recent Labs   Lab 03/22/20  0306   LACTATE 1.2     Troponin:   Recent Labs   Lab 03/22/20  0306   TROPONINI <0.030     All pertinent labs within the past 24 hours have been reviewed.    Recent Labs     03/22/20  0358   PH 7.441   PCO2 38.3   PO2 112*   HCO3 26.1   POCSATURATED 99   BE 2     Microbiology Results (last 7 days)     Procedure Component Value Units Date/Time    Strep A culture, throat [[id number]] Collected:  03/19/20 2000    Order Status:  Completed Specimen:  Throat Updated:  03/22/20 0843     Strep A Culture No significant growth      No  Group A  Streptococcus isolated    Urine culture [[id number]] Collected:  03/19/20 2103    Order Status:  Completed Specimen:  Urine, Clean Catch Updated:  03/22/20 0749     Urine Culture, Routine No growth    Narrative:       Indicated criteria for high risk culture:->Other  Other (specify):->possible sepsis    Blood culture x two cultures. Draw prior to  antibiotics. [[id number]] Collected:  03/19/20 2005    Order Status:  Completed Specimen:  Blood from Peripheral, Antecubital, Right Updated:  03/21/20 2232     Blood Culture, Routine No Growth to date      No Growth to date      No Growth to date    Narrative:       Aerobic and anaerobic    Blood culture x two cultures. Draw prior to antibiotics. [[id number]] Collected:  03/19/20 1930    Order Status:  Completed Specimen:  Blood from Peripheral, Upper Arm, Right Updated:  03/21/20 2232     Blood Culture, Routine No Growth to date      No Growth to date      No Growth to date    Narrative:       Aerobic and anaerobic    MRSA Screen by PCR [[id number]] Collected:  03/19/20 2210    Order Status:  Completed Specimen:  Nasopharyngeal Swab from Nasal Updated:  03/20/20 0033     MRSA SCREEN BY PCR Negative    Throat Screen, Rapid [[id number]] Collected:  03/19/20 2000    Order Status:  Completed Specimen:  Throat Updated:  03/19/20 2041     Rapid Strep A Screen Negative     Comment: See Micro for reflexed Strep culture.                 Significant Imaging:   I have reviewed and interpreted all pertinent imaging results/findings within the past 24 hours.     XR CHEST AP PORTABLE    CLINICAL HISTORY:  ET tube placement;    FINDINGS:  Portable chest at 02:37 is compared to a prior study dated 03/20/2020 shows normal cardiomediastinal silhouette.    There is placement of an endotracheal tube with the tip approximately 2.5 cm above the dominique.    There is hypoinflation with progression of the interstitial and alveolar opacities in both lungs predominantly in the right upper lobe.  There is relatively sparing of the lung bases.  Pulmonary vasculature is normal. No acute osseous abnormality.      Impression       Progression of the interstitial and alveolar opacities in both lungs predominantly in the upper and mid lungs.  Differential includes viral or atypical pneumonia versus edema      Electronically signed by: Roz Eugene  MD  Date: 03/22/2020  Time: 07:10         Assessment/Plan:     * Acute hypoxemic respiratory failure  · Now on vent   · No weaning, adjust as needed and follow    Aspiration pneumonia  · By history  · Continue antibiotics    Suspected Covid-19 Virus Infection  · I am told that there has been a + test on another pt from [place]  · Test pending  · Take precautions    Anemia  · No acute blood loss reported, follow     Hypokalemia  · Replace     CVA (cerebral vascular accident)  · Aware     Pneumonia of both lungs due to infectious organism  · As above, continue treatments    HTN (hypertension)  · Aware, follow     Critical Care Time    I have spent > 35 minutes providing critical care services for this pt for the above diagnoses.  These services have included pt evaluation, pt exam, ventilator assessment, discussions with staff, chart review, data review, note preparation and .  The patient has life threatening illness with a high risk of decompensation and/or death.        Thank you for your consult. I will follow-up with patient. Please contact us if you have any additional questions.     You Taylor MD  Pulmonology  [facility]

## 2020-03-22 NOTE — PLAN OF CARE
Problem: Oral Intake Inadequate  Goal: Improved Oral Intake  Outcome: Ongoing, Progressing  Intervention: Promote and Optimize Oral Intake  Flowsheets (Taken 3/21/2020 1923)  Oral Nutrition Promotion: calorie dense liquids provided

## 2020-03-22 NOTE — HPI
70 yo male admitted from [place] with suspected aspiration pneumonia (also being ruled out for Covid).  Last PM after eating he developed respiratory distress and required intubation/ventilation and I was asked to see in consultation.  He is currently sedated and ventilated and not able to provide any history.  Chart has been reviewed and case discussed with Dr Ball this AM.

## 2020-03-22 NOTE — ASSESSMENT & PLAN NOTE
Agency/Facility Name: [facility]  Spoke To: Isabel  Outcome: Patient accepted pending insurance [employer].    · I am told that there has been a + test on another pt from [place]  · Test pending  · Take precautions

## 2020-03-22 NOTE — CONSULTS
Consult Note  Infectious Disease    Reason for Consult:  Worsening respiratory failure, suspect COVID and aspiration    HPI: Mukesh Addison is a   69 y.o. male who was brought from the nursing home on 03/19 with a temperature of 103°, shortness of breath and concern for aspiration pneumonia.  Initial chest x-ray had right greater than left basilar infiltrate.  Testing for corona virus was performed, he was isolated and started on broad-spectrum antibiotics for aspiration pneumonia, including rocephin and  azithromycin.  Initial WBC 4.6, procal normal, lactic acid normal, flu negative, MRSA screen neg, CXR and CT chest more consistent with RUL focal pneumonia, but  Breathing and CXR worsened today after eating and he was moved to ICU and intubated. Blood and urine cultures from admit are negative. Spiked again last night COVID test pending. He was started on HCQ on 3/20 pending the test.  Rocephin was changed to zosyn.    flu test : 3/19 neg  Procalcitonin:  Normal 3/19  COVID 19: 3/19   G6PD:  3/22  QTC baseline:  419  QTC serial:326   HIV Ab :  Troponin: normal on admit  BNP : normal on admit  Hydroxychloroquine started : 3/20  Darunavir/CbST started  :  azithromycin started: 3/19  Remdesivir  started : not available    Review of patient's allergies indicates:  No Known Allergies  Past Medical History:   Diagnosis Date    Diabetes mellitus     GERD (gastroesophageal reflux disease)     Hypertension     Stroke     Parkinson's disease  nonverbal  History of dysphagia   History reviewed. No pertinent surgical history.  Social History     Socioeconomic History    Marital status: Single     Spouse name: Not on file    Number of children: Not on file    Years of education: Not on file    Highest education level: Not on file   Occupational History    Not on file   Social Needs    Financial resource strain: Not on file    Food insecurity:     Worry: Not on file     Inability: Not on file    Transportation needs:      Medical: Not on file     Non-medical: Not on file   Tobacco Use    Smoking status: Never Smoker    Smokeless tobacco: Never Used   Substance and Sexual Activity    Alcohol use: Not on file    Drug use: Not on file    Sexual activity: Not on file   Lifestyle    Physical activity:     Days per week: Not on file     Minutes per session: Not on file    Stress: Not on file   Relationships    Social connections:     Talks on phone: Not on file     Gets together: Not on file     Attends Faith service: Not on file     Active member of club or organization: Not on file     Attends meetings of clubs or organizations: Not on file     Relationship status: Not on file   Other Topics Concern    Not on file   Social History Narrative    Not on file     History reviewed. No pertinent family history.    Pertinent medications noted:     Review of Systems: unobtainable    EXAM & DIAGNOSTICS REVIEWED:   Vitals:     Temp:  [98.2 °F (36.8 °C)-101 °F (38.3 °C)]   Temp: 99.3 °F (37.4 °C) (03/22/20 0200)  Pulse: 80 (03/22/20 0739)  Resp: (!) 30 (03/22/20 0739)  BP: (!) 93/56 (03/22/20 0358)  SpO2: 98 % (03/22/20 0739)    Intake/Output Summary (Last 24 hours) at 3/22/2020 1023  Last data filed at 3/22/2020 0123  Gross per 24 hour   Intake 540 ml   Output 850 ml   Net -310 ml       General:  Uncomfortable with intubation  Eyes:  Anicteric, PERRL,   ENT:  No ulcers, exudates, thrush, nares patent, dentition is poor  Neck:  supple, no masses or adenopathy appreciated  Lungs: Coarse without consolidation  Heart:  RRR, no gallop/murmur/rub noted  Abd:  Soft, NT, ND, normal BS, no masses or organomegaly appreciated.  :   Gerardo, urine clear, no flank tenderness  Musc:  Joints without effusion, swelling, erythema, synovitis,  With extensive muscle wasting.   Skin:  Mild seborrhea. No palmar or plantar lesions. No subungual petechiae  Wound:   Neuro:  sedated, partially. Uncomfortable, wasted musculature,    Psych:  sedated  Lymphatic:     No cervical, supraclavicular,   nodes  Extrem: No edema, erythema, phlebitis, cellulitis, warm and well perfused, pos clubbing, SCDs in place  VAD:   peripherals    Isolation:  Airborne, contact, droplet    Lines/Tubes/Drains:    General Labs reviewed:  Recent Labs   Lab 03/20/20 0416 03/21/20 0347 03/22/20  0213 03/22/20  0306 03/22/20  0358   WBC 3.02* 6.79  --  4.69  --    HGB 10.9* 11.1*  --  10.7*  --    HCT 33.2* 33.6* 31* 32.5* 32*   * 150  --  154  --        Recent Labs   Lab 03/19/20  1930 03/20/20 0416 03/21/20 0347 03/22/20  0306    142 143 141   K 4.1 3.6 3.5 3.3*    113* 112* 108   CO2 24 24 23 20*   BUN 22 16 19 20   CREATININE 0.7 0.6 0.7 0.6   CALCIUM 9.1 8.3* 8.6* 8.5*   PROT 7.3  --   --   --    BILITOT 0.6  --   --   --    ALKPHOS 70  --   --   --    ALT 16  --   --   --    AST 24  --   --   --      No results for input(s): CRP in the last 168 hours.        Micro:  Microbiology Results (last 7 days)     Procedure Component Value Units Date/Time    Strep A culture, throat [[id number]] Collected:  03/19/20 2000    Order Status:  Completed Specimen:  Throat Updated:  03/22/20 0843     Strep A Culture No significant growth      No  Group A  Streptococcus isolated    Urine culture [[id number]] Collected:  03/19/20 2103    Order Status:  Completed Specimen:  Urine, Clean Catch Updated:  03/22/20 0749     Urine Culture, Routine No growth    Narrative:       Indicated criteria for high risk culture:->Other  Other (specify):->possible sepsis    Blood culture x two cultures. Draw prior to antibiotics. [[id number]] Collected:  03/19/20 2005    Order Status:  Completed Specimen:  Blood from Peripheral, Antecubital, Right Updated:  03/21/20 2232     Blood Culture, Routine No Growth to date      No Growth to date      No Growth to date    Narrative:       Aerobic and anaerobic    Blood culture x two cultures. Draw prior to antibiotics. [[id number]] Collected:   03/19/20 1930    Order Status:  Completed Specimen:  Blood from Peripheral, Upper Arm, Right Updated:  03/21/20 2232     Blood Culture, Routine No Growth to date      No Growth to date      No Growth to date    Narrative:       Aerobic and anaerobic    MRSA Screen by PCR [[id number]] Collected:  03/19/20 2210    Order Status:  Completed Specimen:  Nasopharyngeal Swab from Nasal Updated:  03/20/20 0033     MRSA SCREEN BY PCR Negative    Throat Screen, Rapid [[id number]] Collected:  03/19/20 2000    Order Status:  Completed Specimen:  Throat Updated:  03/19/20 2041     Rapid Strep A Screen Negative     Comment: See Micro for reflexed Strep culture.           Imaging Reviewed:   CXRs    CT chest    Cardiology:    IMPRESSION & PLAN   1. Aspiration pneumonia, x 2?, now with respiratory failure  2. LTC resident, COVID pending  3. Debilitated and non verbal from Parkinson's   4.       Recommendations:  Check G6PD  Sputum culture  Maintain isolation  Continue zosyn for aspiration pneumonia

## 2020-03-22 NOTE — PROGRESS NOTES
I was called by the house supervisor that patient is in severe respiratory distress and I need to be at bedside to evaluate this patient. When I walked in to the room, I saw Mr. Addison in severe respiratory distress. He was breathing over 40 times a minute. He was on nasal canula at 3-4 L and saturating in 80's. I increased the oxygen to 10 L after which he was able to breath in early 90s. He was completely altered and was able to follow my commands intermittently. His encephalopathy precluded a thorough neurological exam however I was able to rule out stroke as the exam was non focal. On respiratory exam he was tachypnea, belly breathing, using all his accessory muscles and had coarse breath sounds with wheezing all over his chest. Abdominal exam was soft, non tender and non distended. I immediately arranged a bed and transferred him to ICU. ABG obtained showed PaO2 of 60% with normal PH and PCO2. I called ER doctor for intubation. He was successfully intubated. CXR was obtained that showed worsening opacity in the right upper lobe and bilateral hilar region. ET tube was pulled out 2 cm and position was confirmed.      I was told that just before this happened he was fed and his breathing got worst after that. I reviewed previous notes. He has severe pharyngeal dysphagia since 2019 and speech has evaluated him during this admission for the same. In the light of these findings, I will broaden his antibiotic coverage to Zosyn. Differentials at this point include acute hypoxemic respiratory failure due aspiration pneumonitis vs aspiration pneumonia vs worsening COVID-19 infection. I have ordered propofol for sedation. Mechanical ventilation was adjusted according to Ideal body weight. All repeat labs were reviewed. Awaiting repeat ABG. Pulmonary consulted for further vent management.     Plan was discussed with the RN, RT present at bedside. I updated the family and answered all their questions to full  satisfaction.    The patient is critically ill due to acute hypoxemic failure, Pneumonia, aspiration, covid 19 infection, encephalopathy.  I spent 45 minutes directly caring for this patient including reviewing records, assessing the patient, adjustment of medications, starting antibiotics,  Sedation and ventilation adjustment and calling family. This does not include time spent performing separately billed procedures ans is not concurrent with other providers.

## 2020-03-22 NOTE — PROGRESS NOTES
"[facility]  Adult Nutrition   Progress Note (Follow-Up)    SUMMARY     Recommendations  Recommendation/Intervention: 1. Continue Diet Dysphagia Mechanical Soft (IDDSI Level 5) Honey Thick and advance as medically able per SLP recommendations. 2. RD added ONS, Ensure Enlive TID (to provide 1050 kcal/day and 60 g/day protein)    Goals: 1. Patient to tolerate diet consistency. 2. Patient to meet at least 75% of estimated energy and protein needs via PO intake of meals and supplements.   Nutrition Goal Status: new  Communication of RD Recs: reviewed with RN    Dietitian Rounds Brief  Diet initiated per SLP recommendations. Patient coughing and not tolerating meats. Patient received sausage today and could not tolerate. RN states patient needs very soft meats chopped. RD added minced/finely chopped meats to diet. RD added ensure enlive TID (honey tick) to better meet needs.     Reason for Assessment  Reason For Assessment: RD follow-up    Nutrition Risk Screen  Nutrition Risk Screen: dysphagia or difficulty swallowing     MST Score: 2  Have you recently lost weight without trying?: Unsure  Weight loss score: 2  Have you been eating poorly because of a decreased appetite?: No(Unable to assess)  Appetite score: 0       Nutrition/Diet History  Spiritual, Cultural Beliefs, Rastafari Practices, Values that Affect Care: other (see comments)(pt non verbal)  Food Allergies: NKFA  Factors Affecting Nutritional Intake: difficulty/impaired swallowing    Anthropometrics  Temp: 98.2 °F (36.8 °C)  Height Method: Estimated  Height: 5' 5" (165.1 cm)  Height (inches): 65 in  Weight Method: Bed Scale  Weight: 69.1 kg (152 lb 5.4 oz)  Weight (lb): 152.34 lb  Ideal Body Weight (IBW), Male: 136 lb  % Ideal Body Weight, Male (lb): 109.1 %  BMI (Calculated): 25.4  BMI Grade: 25 - 29.9 - overweight       Weight History:  Wt Readings from Last 10 Encounters:   03/20/20 69.1 kg (152 lb 5.4 oz)   03/20/20 69.1 kg (152 lb 5.4 oz) "   12/12/19 68 kg (150 lb)   09/13/16 81.6 kg (180 lb)       Lab/Procedures/Meds: Pertinent Labs Reviewed  Clinical Chemistry:  Recent Labs   Lab 03/19/20  1930 03/20/20  0416 03/21/20  0347    142 143   K 4.1 3.6 3.5    113* 112*   CO2 24 24 23    152* 115*   BUN 22 16 19   CREATININE 0.7 0.6 0.7   CALCIUM 9.1 8.3* 8.6*   PROT 7.3  --   --    ALBUMIN 3.9  --   --    BILITOT 0.6  --   --    ALKPHOS 70  --   --    AST 24  --   --    ALT 16  --   --    ANIONGAP 11 5* 8   ESTGFRAFRICA >60.0 >60.0 >60.0   EGFRNONAA >60.0 >60.0 >60.0   MG 2.1 2.1 2.2   PHOS 3.3 3.8 3.2   LIPASE 35  --   --      CBC:   Recent Labs   Lab 03/21/20  0347   WBC 6.79   RBC 3.47*   HGB 11.1*   HCT 33.6*      MCV 97   MCH 32.0*   MCHC 33.0     Cardiac Profile:  Recent Labs   Lab 03/19/20 1930   BNP 13   TROPONINI <0.030     Medications: Pertinent Medications reviewed  Scheduled Meds:   albuterol-ipratropium  3 mL Nebulization Q6H    aspirin  81 mg Oral Daily    cefTRIAXone (ROCEPHIN) IVPB  1 g Intravenous Q24H    And    azithromycin  500 mg Intravenous Q24H    carbidopa-levodopa  mg  1 tablet Oral TID    enoxparin  40 mg Subcutaneous Q24H    hydroxychloroquine  200 mg Oral BID    lisinopriL  10 mg Oral QHS     Continuous Infusions:   sodium chloride 0.9% 75 mL/hr at 03/21/20 0456     PRN Meds:.acetaminophen, acetaminophen, dextrose 50%, dextrose 50%, insulin regular, sodium chloride 0.9%    Estimated/Assessed Needs  Weight Used For Calorie Calculations: 69.1 kg (152 lb 5.4 oz)  Energy Calorie Requirements (kcal): 3882-6499 kcals/day (25-30 kcals/kg)  Energy Need Method: Kcal/kg  Protein Requirements: 69-90 g/day (1.0-1.3 g/kg)  Weight Used For Protein Calculations: 69.1 kg (152 lb 5.4 oz)     Estimated Fluid Requirement Method: RDA Method  RDA Method (mL): 0169       Nutrition Prescription Ordered  Current Diet Order: Dysphagia Mechanical Soft (IDDSI Level 5) Honey Thick    Evaluation of Received  Nutrient/Fluid Intake  Energy Calories Required: not meeting needs  Protein Required: not meeting needs  Fluid Required: not meeting needs  Tolerance: not tolerating     Intake/Output Summary (Last 24 hours) at 3/21/2020 1921  Last data filed at 3/21/2020 1645  Gross per 24 hour   Intake 1137.5 ml   Output 1850 ml   Net -712.5 ml      % Intake of Estimated Energy Needs: 25 - 50 %  % Meal Intake: 25 - 50 %    Nutrition Risk  Level of Risk/Frequency of Follow-up: high     Monitor and Evaluation  Food and Nutrient Intake: energy intake, food and beverage intake  Food and Nutrient Adminstration: diet order  Physical Activity and Function: nutrition-related ADLs and IADLs, factors affecting access to physical activity  Anthropometric Measurements: weight, weight change, body mass index  Biochemical Data, Medical Tests and Procedures: electrolyte and renal panel, glucose/endocrine profile, lipid profile, gastrointestinal profile, inflammatory profile  Nutrition-Focused Physical Findings: overall appearance     Nutrition Follow-Up  RD Follow-up?: Yes     Alee Jaquez RD 03/21/2020 7:22 PM

## 2020-03-22 NOTE — ANESTHESIA PROCEDURE NOTES
Arterial    Diagnosis: Respiratory failure    Patient location during procedure: ICU  Procedure start time: 3/22/2020 3:28 AM  Timeout: 3/22/2020 3:28 AM  Procedure end time: 3/22/2020 3:34 AM    Staffing  Authorizing Provider: Jose Lambert MD  Performing Provider: Jose Lambert MD    Anesthesiologist was present at the time of the procedure.    Preanesthetic Checklist  Completed: patient identified, site marked, surgical consent, pre-op evaluation, timeout performed, IV checked, risks and benefits discussed, monitors and equipment checked and anesthesia consent givenArterial  Skin Prep: chlorhexidine gluconate  Local Infiltration: none  Orientation: right  Location: radial  Catheter Size: 20 G  Catheter placement by Ultrasound guidance. Heme positive aspiration all ports.  Vessel Caliber: large, patent, compressibility normal  Vascular Doppler:  not done  Needle advanced into vessel with real time Ultrasound guidance.  Sterile sheath used.Insertion Attempts: 1  Assessment  Dressing: secured with tape and tegaderm and sutured in place and taped  Patient: Tolerated well

## 2020-03-23 LAB
ALLENS TEST: ABNORMAL
BASOPHILS # BLD AUTO: 0 K/UL (ref 0–0.2)
BASOPHILS NFR BLD: 0 % (ref 0–1.9)
DELSYS: ABNORMAL
DIFFERENTIAL METHOD: ABNORMAL
EOSINOPHIL # BLD AUTO: 0 K/UL (ref 0–0.5)
EOSINOPHIL NFR BLD: 0.6 % (ref 0–8)
ERYTHROCYTE [DISTWIDTH] IN BLOOD BY AUTOMATED COUNT: 12.4 % (ref 11.5–14.5)
ERYTHROCYTE [SEDIMENTATION RATE] IN BLOOD BY WESTERGREN METHOD: 16 MM/H
FIO2: 30
GLUCOSE SERPL-MCNC: 84 MG/DL (ref 70–110)
GLUCOSE SERPL-MCNC: 85 MG/DL (ref 70–110)
GLUCOSE SERPL-MCNC: 88 MG/DL (ref 70–110)
HCO3 UR-SCNC: 22.6 MMOL/L (ref 24–28)
HCO3 UR-SCNC: 22.7 MMOL/L (ref 24–28)
HCO3 UR-SCNC: 23.9 MMOL/L (ref 24–28)
HCT VFR BLD AUTO: 23.1 % (ref 40–54)
HCT VFR BLD CALC: 28 %PCV (ref 36–54)
HCT VFR BLD CALC: 28 %PCV (ref 36–54)
HCT VFR BLD CALC: 31 %PCV (ref 36–54)
HGB BLD-MCNC: 7.7 G/DL (ref 14–18)
IMM GRANULOCYTES # BLD AUTO: 0.02 K/UL (ref 0–0.04)
IMM GRANULOCYTES NFR BLD AUTO: 0.6 % (ref 0–0.5)
LYMPHOCYTES # BLD AUTO: 0.7 K/UL (ref 1–4.8)
LYMPHOCYTES NFR BLD: 20.4 % (ref 18–48)
MCH RBC QN AUTO: 32.2 PG (ref 27–31)
MCHC RBC AUTO-ENTMCNC: 33.3 G/DL (ref 32–36)
MCV RBC AUTO: 97 FL (ref 82–98)
MODE: ABNORMAL
MONOCYTES # BLD AUTO: 0.2 K/UL (ref 0.3–1)
MONOCYTES NFR BLD: 7.5 % (ref 4–15)
NEUTROPHILS # BLD AUTO: 2.3 K/UL (ref 1.8–7.7)
NEUTROPHILS NFR BLD: 70.9 % (ref 38–73)
NRBC BLD-RTO: 0 /100 WBC
PCO2 BLDA: 35.7 MMHG (ref 35–45)
PCO2 BLDA: 36.9 MMHG (ref 35–45)
PCO2 BLDA: 37.5 MMHG (ref 35–45)
PEEP: 5
PEEP: 5
PEEP: 7
PH SMN: 7.39 [PH] (ref 7.35–7.45)
PH SMN: 7.4 [PH] (ref 7.35–7.45)
PH SMN: 7.43 [PH] (ref 7.35–7.45)
PLATELET # BLD AUTO: 114 K/UL (ref 150–350)
PMV BLD AUTO: 10.8 FL (ref 9.2–12.9)
PO2 BLDA: 60 MMHG (ref 80–100)
PO2 BLDA: 76 MMHG (ref 80–100)
PO2 BLDA: 98 MMHG (ref 80–100)
POC BE: -2 MMOL/L
POC BE: -2 MMOL/L
POC BE: 0 MMOL/L
POC IONIZED CALCIUM: 1.16 MMOL/L (ref 1.06–1.42)
POC IONIZED CALCIUM: 1.2 MMOL/L (ref 1.06–1.42)
POC IONIZED CALCIUM: 1.2 MMOL/L (ref 1.06–1.42)
POC PCO2 TEMP: 36.9 MMHG
POC PH TEMP: 7.4
POC PO2 TEMP: 76 MMHG
POC SATURATED O2: 90 % (ref 95–100)
POC SATURATED O2: 95 % (ref 95–100)
POC SATURATED O2: 98 % (ref 95–100)
POC TCO2: 24 MMOL/L (ref 23–27)
POC TCO2: 24 MMOL/L (ref 23–27)
POC TCO2: 25 MMOL/L (ref 23–27)
POC TEMPERATURE: ABNORMAL
POTASSIUM BLD-SCNC: 3.4 MMOL/L (ref 3.5–5.1)
POTASSIUM BLD-SCNC: 3.6 MMOL/L (ref 3.5–5.1)
POTASSIUM BLD-SCNC: 3.7 MMOL/L (ref 3.5–5.1)
PS: 10
RBC # BLD AUTO: 2.39 M/UL (ref 4.6–6.2)
SAMPLE: ABNORMAL
SITE: ABNORMAL
SODIUM BLD-SCNC: 139 MMOL/L (ref 136–145)
SODIUM BLD-SCNC: 141 MMOL/L (ref 136–145)
SODIUM BLD-SCNC: 143 MMOL/L (ref 136–145)
SP02: 93
SPONT RATE: 37
VT: 350
WBC # BLD AUTO: 3.19 K/UL (ref 3.9–12.7)

## 2020-03-23 PROCEDURE — 63600175 PHARM REV CODE 636 W HCPCS: Performed by: NURSE PRACTITIONER

## 2020-03-23 PROCEDURE — 83605 ASSAY OF LACTIC ACID: CPT

## 2020-03-23 PROCEDURE — 84295 ASSAY OF SERUM SODIUM: CPT

## 2020-03-23 PROCEDURE — 85025 COMPLETE CBC W/AUTO DIFF WBC: CPT

## 2020-03-23 PROCEDURE — 21400001 HC TELEMETRY ROOM

## 2020-03-23 PROCEDURE — 86140 C-REACTIVE PROTEIN: CPT

## 2020-03-23 PROCEDURE — 82803 BLOOD GASES ANY COMBINATION: CPT

## 2020-03-23 PROCEDURE — 36415 COLL VENOUS BLD VENIPUNCTURE: CPT

## 2020-03-23 PROCEDURE — 25000003 PHARM REV CODE 250: Performed by: INTERNAL MEDICINE

## 2020-03-23 PROCEDURE — 84100 ASSAY OF PHOSPHORUS: CPT

## 2020-03-23 PROCEDURE — 37799 UNLISTED PX VASCULAR SURGERY: CPT

## 2020-03-23 PROCEDURE — 99291 CRITICAL CARE FIRST HOUR: CPT | Mod: ,,, | Performed by: INTERNAL MEDICINE

## 2020-03-23 PROCEDURE — 80048 BASIC METABOLIC PNL TOTAL CA: CPT

## 2020-03-23 PROCEDURE — 99291 PR CRITICAL CARE, E/M 30-74 MINUTES: ICD-10-PCS | Mod: ,,, | Performed by: INTERNAL MEDICINE

## 2020-03-23 PROCEDURE — 93005 ELECTROCARDIOGRAM TRACING: CPT | Performed by: SPECIALIST

## 2020-03-23 PROCEDURE — 63600175 PHARM REV CODE 636 W HCPCS: Performed by: INTERNAL MEDICINE

## 2020-03-23 PROCEDURE — S0028 INJECTION, FAMOTIDINE, 20 MG: HCPCS | Performed by: INTERNAL MEDICINE

## 2020-03-23 PROCEDURE — 99900035 HC TECH TIME PER 15 MIN (STAT)

## 2020-03-23 PROCEDURE — 84132 ASSAY OF SERUM POTASSIUM: CPT

## 2020-03-23 PROCEDURE — 82330 ASSAY OF CALCIUM: CPT

## 2020-03-23 PROCEDURE — 20000000 HC ICU ROOM

## 2020-03-23 PROCEDURE — 99900026 HC AIRWAY MAINTENANCE (STAT)

## 2020-03-23 PROCEDURE — 85014 HEMATOCRIT: CPT

## 2020-03-23 PROCEDURE — 94761 N-INVAS EAR/PLS OXIMETRY MLT: CPT

## 2020-03-23 PROCEDURE — 27000221 HC OXYGEN, UP TO 24 HOURS

## 2020-03-23 PROCEDURE — 94003 VENT MGMT INPAT SUBQ DAY: CPT

## 2020-03-23 PROCEDURE — 83735 ASSAY OF MAGNESIUM: CPT

## 2020-03-23 RX ADMIN — FAMOTIDINE 20 MG: 10 INJECTION INTRAVENOUS at 09:03

## 2020-03-23 RX ADMIN — PIPERACILLIN AND TAZOBACTAM 3.38 G: 3; .375 INJECTION, POWDER, FOR SOLUTION INTRAVENOUS at 03:03

## 2020-03-23 RX ADMIN — CARBIDOPA AND LEVODOPA 1 TABLET: 25; 100 TABLET ORAL at 08:03

## 2020-03-23 RX ADMIN — MUPIROCIN: 20 OINTMENT TOPICAL at 09:03

## 2020-03-23 RX ADMIN — FAMOTIDINE 20 MG: 10 INJECTION INTRAVENOUS at 08:03

## 2020-03-23 RX ADMIN — CHLORHEXIDINE GLUCONATE 15 ML: 1.2 RINSE ORAL at 08:03

## 2020-03-23 RX ADMIN — PROPOFOL 50 MCG/KG/MIN: 10 INJECTION, EMULSION INTRAVENOUS at 12:03

## 2020-03-23 RX ADMIN — HYDROXYCHLOROQUINE SULFATE 200 MG: 200 TABLET, FILM COATED ORAL at 09:03

## 2020-03-23 RX ADMIN — PIPERACILLIN AND TAZOBACTAM 3.38 G: 3; .375 INJECTION, POWDER, FOR SOLUTION INTRAVENOUS at 09:03

## 2020-03-23 RX ADMIN — HYDROXYCHLOROQUINE SULFATE 200 MG: 200 TABLET, FILM COATED ORAL at 08:03

## 2020-03-23 RX ADMIN — ASPIRIN 81 MG 81 MG: 81 TABLET ORAL at 08:03

## 2020-03-23 RX ADMIN — LISINOPRIL 10 MG: 10 TABLET ORAL at 09:03

## 2020-03-23 RX ADMIN — PROPOFOL 50 MCG/KG/MIN: 10 INJECTION, EMULSION INTRAVENOUS at 08:03

## 2020-03-23 RX ADMIN — ENOXAPARIN SODIUM 40 MG: 100 INJECTION SUBCUTANEOUS at 03:03

## 2020-03-23 RX ADMIN — CARBIDOPA AND LEVODOPA 1 TABLET: 25; 100 TABLET ORAL at 03:03

## 2020-03-23 RX ADMIN — PROPOFOL 50 MCG/KG/MIN: 10 INJECTION, EMULSION INTRAVENOUS at 04:03

## 2020-03-23 RX ADMIN — AZITHROMYCIN MONOHYDRATE 500 MG: 500 INJECTION, POWDER, LYOPHILIZED, FOR SOLUTION INTRAVENOUS at 09:03

## 2020-03-23 RX ADMIN — POTASSIUM CHLORIDE 40 MEQ: 20 SOLUTION ORAL at 04:03

## 2020-03-23 RX ADMIN — CARBIDOPA AND LEVODOPA 1 TABLET: 25; 100 TABLET ORAL at 09:03

## 2020-03-23 RX ADMIN — CHLORHEXIDINE GLUCONATE 15 ML: 1.2 RINSE ORAL at 09:03

## 2020-03-23 RX ADMIN — PROPOFOL 5 MCG/KG/MIN: 10 INJECTION, EMULSION INTRAVENOUS at 10:03

## 2020-03-23 RX ADMIN — PIPERACILLIN AND TAZOBACTAM 3.38 G: 3; .375 INJECTION, POWDER, FOR SOLUTION INTRAVENOUS at 04:03

## 2020-03-23 NOTE — PROGRESS NOTES
[facility]  Pulmonology  Progress Note    Patient Name: Mukesh Addison  MRN: [id number]  Admission Date: 3/19/2020  Hospital Length of Stay: 3 days  Code Status: Full Code  Attending Provider: Elie Mcghee MD  Primary Care Provider: Primary Doctor No   Principal Problem: Acute hypoxemic respiratory failure    Subjective:     Interval History:     3/23/2020 - Pt stable overnight, looks better this A and O2 has been decreased.  Has not had much in the way of secretions.  We will plan to try a SBT and see how he does (hopefully we can extubate).  HGB has decreased but no active bleeding reported.    Review of Systems   Unable to perform ROS: Intubated         Objective:     Vital Signs (Most Recent):  Temp: 97.4 °F (36.3 °C) (03/23/20 0400)  Pulse: 71 (03/23/20 0900)  Resp: (!) 28 (03/23/20 0900)  BP: (!) 102/59 (03/23/20 0900)  SpO2: 100 % (03/23/20 0900) Vital Signs (24h Range):  Temp:  [97 °F (36.1 °C)-97.4 °F (36.3 °C)] 97.4 °F (36.3 °C)  Pulse:  [65-80] 71  Resp:  [25-56] 28  SpO2:  [93 %-100 %] 100 %  BP: ()/(50-63) 102/59  Arterial Line BP: (141)/(67) 141/67     Weight: 69.1 kg (152 lb 5.4 oz)  Body mass index is 25.35 kg/m².      Intake/Output Summary (Last 24 hours) at 3/23/2020 1626  Last data filed at 3/23/2020 0425  Gross per 24 hour   Intake 3069.27 ml   Output 950 ml   Net 2119.27 ml       Physical Exam   Constitutional: He appears well-developed and well-nourished. No distress.   chronically ill male  Intubated, sedated   HENT:   Head: Normocephalic and atraumatic.   Right Ear: External ear normal.   Left Ear: External ear normal.   Nose: Nose normal.   Mouth/Throat: Oropharynx is clear and moist.   intubated   Eyes: Pupils are equal, round, and reactive to light. EOM are normal.   Neck: Normal range of motion. Neck supple. No JVD present. No tracheal deviation present. No thyromegaly present.   Cardiovascular: Normal rate, regular rhythm, normal heart sounds and intact distal pulses.  Exam reveals no gallop and no friction rub.   No murmur heard.  Pulmonary/Chest: Effort normal and breath sounds normal. No stridor. No respiratory distress. He has no wheezes. He has no rales. He exhibits no tenderness.   ventilated   Abdominal: Soft. Bowel sounds are normal. He exhibits no distension. There is no tenderness. There is no rebound and no guarding.   Genitourinary:   Genitourinary Comments: holbrook   Musculoskeletal: Normal range of motion. He exhibits no edema or tenderness.   Lymphadenopathy:     He has no cervical adenopathy.   Neurological: He has normal reflexes. No cranial nerve deficit.   sedated   Skin: He is not diaphoretic.   Psychiatric:   Not able to assess   Nursing note and vitals reviewed.      Vents:  Vent Mode: A/C (03/23/20 0900)  Ventilator Initiated: Yes (03/22/20 0255)  Set Rate: 16 BPM (03/23/20 0900)  Vt Set: 350 mL (03/23/20 0900)  Pressure Support: 0 cmH20 (03/23/20 0900)  PEEP/CPAP: 5 cmH20 (03/23/20 0900)  Oxygen Concentration (%): 30 (03/23/20 0900)  Peak Airway Pressure: 20 cmH2O (03/23/20 0900)  Plateau Pressure: 12 cmH20 (03/23/20 0900)  Total Ve: 10.4 mL (03/23/20 0900)  F/VT Ratio<105 (RSBI): (!) 76.5 (03/23/20 0900)    Lines/Drains/Airways     Drain                 Urethral Catheter 03/19/20 2102 Straight-tip 16 Fr. 3 days          Airway                 Airway - Non-Surgical 03/22/20 0230 Endotracheal Tube 1 day          Arterial Line                 Arterial Line 03/22/20 0328 Right Radial 1 day          Peripheral Intravenous Line                 Peripheral IV - Single Lumen 03/19/20 1830 20 G Left Forearm 3 days         Peripheral IV - Single Lumen 03/19/20 1925 18 G Right Upper Arm 3 days                Significant Labs:    CBC/Anemia Profile:  Recent Labs   Lab 03/22/20  0306 03/22/20  0358 03/23/20  0327 03/23/20  0419   WBC 4.69  --   --  3.19*   HGB 10.7*  --   --  7.7*   HCT 32.5* 32* 28* 23.1*     --   --  114*   MCV 96  --   --  97   RDW 12.3  --   --   12.4        Chemistries:  Recent Labs   Lab 03/22/20  0306      K 3.3*      CO2 20*   BUN 20   CREATININE 0.6   CALCIUM 8.5*   MG 2.1   PHOS 3.6       All pertinent labs within the past 24 hours have been reviewed.    Recent Labs     03/23/20  0327   PH 7.434   PCO2 35.7   PO2 98   HCO3 23.9*   POCSATURATED 98   BE 0     Microbiology Results (last 7 days)     Procedure Component Value Units Date/Time    Culture, Respiratory with Gram Stain [[id number]] Collected:  03/22/20 1401    Order Status:  Completed Specimen:  Respiratory from Tracheal Aspirate Updated:  03/23/20 0705     Respiratory Culture No Growth     Gram Stain (Respiratory) <10 epithelial cells per low power field.     Gram Stain (Respiratory) Few WBC's     Gram Stain (Respiratory) Rare Gram positive cocci    Blood culture x two cultures. Draw prior to antibiotics. [[id number]] Collected:  03/19/20 2005    Order Status:  Completed Specimen:  Blood from Peripheral, Antecubital, Right Updated:  03/22/20 2232     Blood Culture, Routine No Growth to date      No Growth to date      No Growth to date      No Growth to date    Narrative:       Aerobic and anaerobic    Blood culture x two cultures. Draw prior to antibiotics. [[id number]] Collected:  03/19/20 1930    Order Status:  Completed Specimen:  Blood from Peripheral, Upper Arm, Right Updated:  03/22/20 2232     Blood Culture, Routine No Growth to date      No Growth to date      No Growth to date      No Growth to date    Narrative:       Aerobic and anaerobic    Culture, Respiratory with Gram Stain [[id number]] Collected:  03/22/20 1401    Order Status:  Canceled Specimen:  Respiratory from Tracheal Aspirate     Strep A culture, throat [[id number]] Collected:  03/19/20 2000    Order Status:  Completed Specimen:  Throat Updated:  03/22/20 0843     Strep A Culture No significant growth      No  Group A  Streptococcus isolated    Urine culture [[id number]] Collected:  03/19/20 2103    Order Status:   Completed Specimen:  Urine, Clean Catch Updated:  03/22/20 0749     Urine Culture, Routine No growth    Narrative:       Indicated criteria for high risk culture:->Other  Other (specify):->possible sepsis    MRSA Screen by PCR [[id number]] Collected:  03/19/20 2210    Order Status:  Completed Specimen:  Nasopharyngeal Swab from Nasal Updated:  03/20/20 0033     MRSA SCREEN BY PCR Negative    Throat Screen, Rapid [[id number]] Collected:  03/19/20 2000    Order Status:  Completed Specimen:  Throat Updated:  03/19/20 2041     Rapid Strep A Screen Negative     Comment: See Micro for reflexed Strep culture.               Significant Imaging:  I have reviewed and interpreted all pertinent imaging results/findings within the past 24 hours.     CXR (3/23) - bilateral infiltrates +/- better    EKG  Vent. Rate : 076 BPM     Atrial Rate : 076 BPM     P-R Int : 116 ms          QRS Dur : 078 ms      QT Int : 376 ms       P-R-T Axes : 050 028 036 degrees     QTc Int : 423 ms    Normal sinus rhythm  Normal ECG  When compared with ECG of 19-MAR-2020 19:48,  No significant change was found  Confirmed by Jesus DOMINGUEZ, Caio MCLEAN ([id number]) on 3/23/2020 11:33:03 AM    Assessment/Plan:     * Acute hypoxemic respiratory failure  · Better , proceed with SBT and possible extubation today    Aspiration pneumonia  · By history  · Continue antibiotics    Suspected Covid-19 Virus Infection  · I am told that there has been a + test on another pt from [place]  · Test pending  · Take precautions    Anemia  · H/H has decreased but no reported blood loss  ·  follow and transfuse for hgb < 7    Hypokalemia  · Replace     CVA (cerebral vascular accident)  · Aware     Pneumonia of both lungs due to infectious organism  · As above, continue treatments    HTN (hypertension)  · Aware, follow     Critical Care Time    I have spent > 35 minutes providing critical care services for this pt for the above diagnoses.  These services have included pt evaluation, pt  exam, ventilator assessment, SBT assessment, discussions with staff, chart review, data review, note preparation and .  The patient has life threatening illness with a high risk of decompensation and/or death.         You Taylor MD  Pulmonology  [facility]

## 2020-03-23 NOTE — CARE UPDATE
03/23/20 0900   PRE-TX-O2   Oxygen Concentration (%) 30   SpO2 100 %   Pulse 71   Resp (!) 28   BP (!) 102/59   Vent Select   Charged w/in last 24h YES   Preset Conventional Ventilator Settings   Ventilation Type VC   Vent Mode A/C   Set Rate 16 BPM   Vt Set 350 mL   PEEP/CPAP 5 cmH20   Pressure Support 0 cmH20   Waveform RAMP   Peak Flow 60 L/min   Plateau Set/Insp. Hold (sec) 0   Trigger Sensitivity Flow/I-Trigger 3 L/min   Patient Ventilator Parameters   Resp Rate Total 28 br/min   Peak Airway Pressure 20 cmH2O   Mean Airway Pressure 9.3 cmH20   Plateau Pressure 12 cmH20   Exhaled Vt 366 mL   Total Ve 10.4 mL   I:E Ratio Measured 1:2.50   Conventional Ventilator Alarms   Resp Rate High Alarm 50 br/min   Press High Alarm 50 cmH2O   Apnea Rate 10   Apnea Volume (mL) 1 mL   Apnea Oxygen Concentration  100   Apnea Flow Rate (L/min) 60   T Apnea 20 sec(s)   Ready to Wean/Extubation Screen   FIO2<=50 (chart decimal) 0.3   MV<16L (chart vol.) 10.4   PEEP <=8 (chart #) 5   Ready to Wean Parameters   F/VT Ratio<105 (RSBI) (!) 76.5   PEEP changed to 5 from 7 per MD

## 2020-03-23 NOTE — SUBJECTIVE & OBJECTIVE
Interval History:     3/23/2020 - Pt stable overnight, looks better this A and O2 has been decreased.  Has not had much in the way of secretions.  We will plan to try a SBT and see how he does (hopefully we can extubate).  HGB has decreased but no active bleeding reported.    Review of Systems   Unable to perform ROS: Intubated         Objective:     Vital Signs (Most Recent):  Temp: 97.4 °F (36.3 °C) (03/23/20 0400)  Pulse: 71 (03/23/20 0900)  Resp: (!) 28 (03/23/20 0900)  BP: (!) 102/59 (03/23/20 0900)  SpO2: 100 % (03/23/20 0900) Vital Signs (24h Range):  Temp:  [97 °F (36.1 °C)-97.4 °F (36.3 °C)] 97.4 °F (36.3 °C)  Pulse:  [65-80] 71  Resp:  [25-56] 28  SpO2:  [93 %-100 %] 100 %  BP: ()/(50-63) 102/59  Arterial Line BP: (141)/(67) 141/67     Weight: 69.1 kg (152 lb 5.4 oz)  Body mass index is 25.35 kg/m².      Intake/Output Summary (Last 24 hours) at 3/23/2020 1626  Last data filed at 3/23/2020 0425  Gross per 24 hour   Intake 3069.27 ml   Output 950 ml   Net 2119.27 ml       Physical Exam   Constitutional: He appears well-developed and well-nourished. No distress.   chronically ill male  Intubated, sedated   HENT:   Head: Normocephalic and atraumatic.   Right Ear: External ear normal.   Left Ear: External ear normal.   Nose: Nose normal.   Mouth/Throat: Oropharynx is clear and moist.   intubated   Eyes: Pupils are equal, round, and reactive to light. EOM are normal.   Neck: Normal range of motion. Neck supple. No JVD present. No tracheal deviation present. No thyromegaly present.   Cardiovascular: Normal rate, regular rhythm, normal heart sounds and intact distal pulses. Exam reveals no gallop and no friction rub.   No murmur heard.  Pulmonary/Chest: Effort normal and breath sounds normal. No stridor. No respiratory distress. He has no wheezes. He has no rales. He exhibits no tenderness.   ventilated   Abdominal: Soft. Bowel sounds are normal. He exhibits no distension. There is no tenderness. There is no  rebound and no guarding.   Genitourinary:   Genitourinary Comments: holbrook   Musculoskeletal: Normal range of motion. He exhibits no edema or tenderness.   Lymphadenopathy:     He has no cervical adenopathy.   Neurological: He has normal reflexes. No cranial nerve deficit.   sedated   Skin: He is not diaphoretic.   Psychiatric:   Not able to assess   Nursing note and vitals reviewed.      Vents:  Vent Mode: A/C (03/23/20 0900)  Ventilator Initiated: Yes (03/22/20 0255)  Set Rate: 16 BPM (03/23/20 0900)  Vt Set: 350 mL (03/23/20 0900)  Pressure Support: 0 cmH20 (03/23/20 0900)  PEEP/CPAP: 5 cmH20 (03/23/20 0900)  Oxygen Concentration (%): 30 (03/23/20 0900)  Peak Airway Pressure: 20 cmH2O (03/23/20 0900)  Plateau Pressure: 12 cmH20 (03/23/20 0900)  Total Ve: 10.4 mL (03/23/20 0900)  F/VT Ratio<105 (RSBI): (!) 76.5 (03/23/20 0900)    Lines/Drains/Airways     Drain                 Urethral Catheter 03/19/20 2102 Straight-tip 16 Fr. 3 days          Airway                 Airway - Non-Surgical 03/22/20 0230 Endotracheal Tube 1 day          Arterial Line                 Arterial Line 03/22/20 0328 Right Radial 1 day          Peripheral Intravenous Line                 Peripheral IV - Single Lumen 03/19/20 1830 20 G Left Forearm 3 days         Peripheral IV - Single Lumen 03/19/20 1925 18 G Right Upper Arm 3 days                Significant Labs:    CBC/Anemia Profile:  Recent Labs   Lab 03/22/20  0306 03/22/20  0358 03/23/20  0327 03/23/20  0419   WBC 4.69  --   --  3.19*   HGB 10.7*  --   --  7.7*   HCT 32.5* 32* 28* 23.1*     --   --  114*   MCV 96  --   --  97   RDW 12.3  --   --  12.4        Chemistries:  Recent Labs   Lab 03/22/20  0306      K 3.3*      CO2 20*   BUN 20   CREATININE 0.6   CALCIUM 8.5*   MG 2.1   PHOS 3.6       All pertinent labs within the past 24 hours have been reviewed.    Recent Labs     03/23/20  0327   PH 7.434   PCO2 35.7   PO2 98   HCO3 23.9*   POCSATURATED 98   BE 0      Microbiology Results (last 7 days)     Procedure Component Value Units Date/Time    Culture, Respiratory with Gram Stain [[id number]] Collected:  03/22/20 1401    Order Status:  Completed Specimen:  Respiratory from Tracheal Aspirate Updated:  03/23/20 0705     Respiratory Culture No Growth     Gram Stain (Respiratory) <10 epithelial cells per low power field.     Gram Stain (Respiratory) Few WBC's     Gram Stain (Respiratory) Rare Gram positive cocci    Blood culture x two cultures. Draw prior to antibiotics. [[id number]] Collected:  03/19/20 2005    Order Status:  Completed Specimen:  Blood from Peripheral, Antecubital, Right Updated:  03/22/20 2232     Blood Culture, Routine No Growth to date      No Growth to date      No Growth to date      No Growth to date    Narrative:       Aerobic and anaerobic    Blood culture x two cultures. Draw prior to antibiotics. [[id number]] Collected:  03/19/20 1930    Order Status:  Completed Specimen:  Blood from Peripheral, Upper Arm, Right Updated:  03/22/20 2232     Blood Culture, Routine No Growth to date      No Growth to date      No Growth to date      No Growth to date    Narrative:       Aerobic and anaerobic    Culture, Respiratory with Gram Stain [[id number]] Collected:  03/22/20 1401    Order Status:  Canceled Specimen:  Respiratory from Tracheal Aspirate     Strep A culture, throat [[id number]] Collected:  03/19/20 2000    Order Status:  Completed Specimen:  Throat Updated:  03/22/20 0843     Strep A Culture No significant growth      No  Group A  Streptococcus isolated    Urine culture [[id number]] Collected:  03/19/20 2103    Order Status:  Completed Specimen:  Urine, Clean Catch Updated:  03/22/20 0749     Urine Culture, Routine No growth    Narrative:       Indicated criteria for high risk culture:->Other  Other (specify):->possible sepsis    MRSA Screen by PCR [[id number]] Collected:  03/19/20 2210    Order Status:  Completed Specimen:  Nasopharyngeal Swab from  Nasal Updated:  03/20/20 0033     MRSA SCREEN BY PCR Negative    Throat Screen, Rapid [[id number]] Collected:  03/19/20 2000    Order Status:  Completed Specimen:  Throat Updated:  03/19/20 2041     Rapid Strep A Screen Negative     Comment: See Micro for reflexed Strep culture.               Significant Imaging:  I have reviewed and interpreted all pertinent imaging results/findings within the past 24 hours.     CXR (3/23) - bilateral infiltrates +/- better    EKG  Vent. Rate : 076 BPM     Atrial Rate : 076 BPM     P-R Int : 116 ms          QRS Dur : 078 ms      QT Int : 376 ms       P-R-T Axes : 050 028 036 degrees     QTc Int : 423 ms    Normal sinus rhythm  Normal ECG  When compared with ECG of 19-MAR-2020 19:48,  No significant change was found  Confirmed by Jesus DOMINGUEZ, Caio MCLEAN ([id number]) on 3/23/2020 11:33:03 AM

## 2020-03-23 NOTE — ASSESSMENT & PLAN NOTE
· I am told that there has been a + test on another pt from [facility]  · Test pending  · Take precautions

## 2020-03-23 NOTE — CARE UPDATE
This note also relates to the following rows which could not be included:  Oxygen Concentration (%) - Cannot attach notes to unvalidated device data  SpO2 - Cannot attach notes to unvalidated device data  Pulse - Cannot attach notes to unvalidated device data  Resp - Cannot attach notes to unvalidated device data  Ventilation Type - Cannot attach notes to unvalidated device data  Vent Mode - Cannot attach notes to unvalidated device data  Set Rate - Cannot attach notes to unvalidated device data  Vt Set - Cannot attach notes to unvalidated device data  PEEP/CPAP - Cannot attach notes to unvalidated device data  Pressure Support - Cannot attach notes to unvalidated device data  Waveform - Cannot attach notes to unvalidated device data  Peak Flow - Cannot attach notes to unvalidated device data  Trigger Sensitivity Flow/I-Trigger - Cannot attach notes to unvalidated device data  Peak Airway Pressure - Cannot attach notes to unvalidated device data  Mean Airway Pressure - Cannot attach notes to unvalidated device data  Plateau Pressure - Cannot attach notes to unvalidated device data  Exhaled Vt - Cannot attach notes to unvalidated device data  Total Ve - Cannot attach notes to unvalidated device data  I:E Ratio Measured - Cannot attach notes to unvalidated device data  Resp Rate High Alarm - Cannot attach notes to unvalidated device data  Press High Alarm - Cannot attach notes to unvalidated device data  Apnea Rate - Cannot attach notes to unvalidated device data  Apnea Volume (mL) - Cannot attach notes to unvalidated device data  Apnea Oxygen Concentration  - Cannot attach notes to unvalidated device data  Apnea Flow Rate (L/min) - Cannot attach notes to unvalidated device data  T Apnea - Cannot attach notes to unvalidated device data       03/23/20 0722   Patient Assessment/Suction   Level of Consciousness (AVPU) unresponsive   Respiratory Effort Normal;Unlabored   Expansion/Accessory Muscles/Retractions no  retractions;no use of accessory muscles;expansion symmetric   All Lung Fields Breath Sounds diminished;equal bilaterally   Rhythm/Pattern, Respiratory assisted mechanically   Cough Frequency with stimulation;infrequent   Cough Type good;assisted   Suction Method tracheal   $ Suction Charges Inline Suction Procedure Stat Charge   Secretions Amount small   Secretions Color white   Secretions Characteristics thin   PRE-TX-O2   O2 Device (Oxygen Therapy) ventilator   $ Is the patient on Low Flow Oxygen? Yes   Aerosol Therapy   $ Aerosol Therapy Charges PRN treatment not required   Daily Review of Necessity (SVN) completed        Airway - Non-Surgical 03/22/20 0230 Endotracheal Tube   Placement Date/Time: 03/22/20 0230   Present Prior to Hospital Arrival?: No  Method of Intubation: Direct laryngoscopy  Inserted by: MD  Staff/Resident Name(s): DR WITT  Airway Device: Endotracheal Tube  Mask Ventilation: Easy  Airway Device Size: ...   Secured at 24 cm   Measured At Lips   Secured Location Left   Secured by Commercial tube byrnes   Bite Block none   Site Condition Cool;Dry   Status Secured   Site Assessment Clean;Dry   Vent Select   Conventional Vent Y   $ Ventilator Subsequent 1   Charged w/in last 24h YES   Preset Conventional Ventilator Settings   Vent ID [id number]   Vent Type    Plateau Set/Insp. Hold (sec) 12   Patient Ventilator Parameters   Resp Rate Total 30 br/min   Respiratory Evaluation   $ Care Plan Tech Time 15 min   oral care done.

## 2020-03-23 NOTE — PROGRESS NOTES
Consult Note  Infectious Disease    Reason for Consult:  Worsening respiratory failure, suspect COVID and aspiration    HPI: Mukesh Addison is a   69 y.o. male who was brought from the nursing home on 03/19 with a temperature of 103°, shortness of breath and concern for aspiration pneumonia.  Initial chest x-ray had right greater than left basilar infiltrate.  Testing for corona virus was performed, he was isolated and started on broad-spectrum antibiotics for aspiration pneumonia, including rocephin and  azithromycin.  Initial WBC 4.6, procal normal, lactic acid normal, flu negative, MRSA screen neg, CXR and CT chest more consistent with RUL focal pneumonia, but  Breathing and CXR worsened today after eating and he was moved to ICU and intubated. Blood and urine cultures from admit are negative. Spiked again last night COVID test pending. He was started on HCQ on 3/20 pending the test.  Rocephin was changed to zosyn.  3/23: afebrile. Hemodynamically stable. Requiring minimal support from vent, FIO2 30%. SBT to commence shortly. UOP good, labs reviewed. COVID pending. Sputum culture normal indu so far.        EXAM & DIAGNOSTICS REVIEWED:   Vitals:     Temp:  [97 °F (36.1 °C)-97.4 °F (36.3 °C)]   Temp: 97.4 °F (36.3 °C) (03/23/20 0400)  Pulse: 71 (03/23/20 0900)  Resp: (!) 28 (03/23/20 0900)  BP: (!) 102/59 (03/23/20 0900)  SpO2: 100 % (03/23/20 0900)    Intake/Output Summary (Last 24 hours) at 3/23/2020 1624  Last data filed at 3/23/2020 0425  Gross per 24 hour   Intake 3069.27 ml   Output 950 ml   Net 2119.27 ml     All available parameters were reviewed at bedside and with nurse without entering the room  Exam 3/23  General:  Uncomfortable with intubation  Eyes:  Anicteric, PERRL,   ENT:  No ulcers, exudates, thrush, nares patent, dentition is poor  Neck:  supple, no masses or adenopathy appreciated  Lungs: Coarse without consolidation  Heart:  RRR, no gallop/murmur/rub noted  Abd:  Soft, NT, ND, normal BS, no  masses or organomegaly appreciated.  :   Gerardo, urine clear, no flank tenderness  Musc:  Joints without effusion, swelling, erythema, synovitis,  With extensive muscle wasting.   Skin:  Mild seborrhea. No palmar or plantar lesions. No subungual petechiae  Wound:   Neuro:  sedated, partially. Uncomfortable, wasted musculature,   Psych:  sedated  Lymphatic:     No cervical, supraclavicular,   nodes  Extrem: No edema, erythema, phlebitis, cellulitis, warm and well perfused, pos clubbing, SCDs in place  VAD:   peripherals    Isolation:  Airborne, contact, droplet    Lines/Tubes/Drains:    General Labs reviewed:  Recent Labs   Lab 03/21/20  0347  03/22/20  0306 03/22/20  0358 03/23/20  0327 03/23/20  0419   WBC 6.79  --  4.69  --   --  3.19*   HGB 11.1*  --  10.7*  --   --  7.7*   HCT 33.6*   < > 32.5* 32* 28* 23.1*     --  154  --   --  114*    < > = values in this interval not displayed.       Recent Labs   Lab 03/19/20  1930 03/20/20  0416 03/21/20  0347 03/22/20  0306    142 143 141   K 4.1 3.6 3.5 3.3*    113* 112* 108   CO2 24 24 23 20*   BUN 22 16 19 20   CREATININE 0.7 0.6 0.7 0.6   CALCIUM 9.1 8.3* 8.6* 8.5*   PROT 7.3  --   --   --    BILITOT 0.6  --   --   --    ALKPHOS 70  --   --   --    ALT 16  --   --   --    AST 24  --   --   --      Recent Labs   Lab 03/22/20  0306   CRP 7.64*       flu test :  3/19  Procalcitonin:  3/19 normal  COVID 19: 3/19  G6PD:  pending  QTC baseline:   QTC serial:401  HIV Ab :  Troponin: normal x 2  BNP :   13  Hydroxychloroquine started :  3/20  Darunavir/CbST started  :  azithromycin started:  Remdesivir  started :      Micro:  Microbiology Results (last 7 days)     Procedure Component Value Units Date/Time    Culture, Respiratory with Gram Stain [[id number]] Collected:  03/22/20 1401    Order Status:  Completed Specimen:  Respiratory from Tracheal Aspirate Updated:  03/23/20 0705     Respiratory Culture No Growth     Gram Stain (Respiratory) <10 epithelial  cells per low power field.     Gram Stain (Respiratory) Few WBC's     Gram Stain (Respiratory) Rare Gram positive cocci    Blood culture x two cultures. Draw prior to antibiotics. [[id number]] Collected:  03/19/20 2005    Order Status:  Completed Specimen:  Blood from Peripheral, Antecubital, Right Updated:  03/22/20 2232     Blood Culture, Routine No Growth to date      No Growth to date      No Growth to date      No Growth to date    Narrative:       Aerobic and anaerobic    Blood culture x two cultures. Draw prior to antibiotics. [[id number]] Collected:  03/19/20 1930    Order Status:  Completed Specimen:  Blood from Peripheral, Upper Arm, Right Updated:  03/22/20 2232     Blood Culture, Routine No Growth to date      No Growth to date      No Growth to date      No Growth to date    Narrative:       Aerobic and anaerobic    Culture, Respiratory with Gram Stain [[id number]] Collected:  03/22/20 1401    Order Status:  Canceled Specimen:  Respiratory from Tracheal Aspirate     Strep A culture, throat [[id number]] Collected:  03/19/20 2000    Order Status:  Completed Specimen:  Throat Updated:  03/22/20 0843     Strep A Culture No significant growth      No  Group A  Streptococcus isolated    Urine culture [[id number]] Collected:  03/19/20 2103    Order Status:  Completed Specimen:  Urine, Clean Catch Updated:  03/22/20 0749     Urine Culture, Routine No growth    Narrative:       Indicated criteria for high risk culture:->Other  Other (specify):->possible sepsis    MRSA Screen by PCR [[id number]] Collected:  03/19/20 2210    Order Status:  Completed Specimen:  Nasopharyngeal Swab from Nasal Updated:  03/20/20 0033     MRSA SCREEN BY PCR Negative    Throat Screen, Rapid [[id number]] Collected:  03/19/20 2000    Order Status:  Completed Specimen:  Throat Updated:  03/19/20 2041     Rapid Strep A Screen Negative     Comment: See Micro for reflexed Strep culture.           Imaging Reviewed:   CXRs    CT  chest    Cardiology:    IMPRESSION & PLAN   1. Aspiration pneumonia, x 2?, now with respiratory failure  2. LTC resident, COVID pending, low WBC  3. Debilitated and non verbal from Parkinson's   4.       Recommendations:  awaiting G6PD, continue hCQ and azithromycin  Sputum culture in progress  Maintain isolation  Continue zosyn for aspiration pneumonia  Need enteral access for feeding and meds (NGT vs balbir)    High risk of demise, and need for IV treatment, high complexity

## 2020-03-23 NOTE — PROGRESS NOTES
Former[facility] Medicine  Progress Note    Patient Name: Mukesh Addison  MRN: [id number]  Patient Class: IP- Inpatient   Admission Date: 3/19/2020  Length of Stay: 3 days  Attending Physician: Elie Mcghee MD  Primary Care Provider: Primary Doctor No        Subjective:     Principal Problem:Acute hypoxemic respiratory failure      Interval History:  No acute overnight events reported. Remains on mechanical ventilation on minimal settings.  CXR with improving interstitial and alveolar opacities bilaterally predominantly located over the upper and mid lungs.     Review of Systems   Unable to perform ROS: Intubated     Objective:     Vital Signs (Most Recent):  Temp: 97.7 °F (36.5 °C) (03/23/20 1501)  Pulse: 67 (03/23/20 1755)  Resp: (!) 23 (03/23/20 1755)  BP: 112/61 (03/23/20 1600)  SpO2: 100 % (03/23/20 1755) Vital Signs (24h Range):  Temp:  [97 °F (36.1 °C)-98 °F (36.7 °C)] 97.7 °F (36.5 °C)  Pulse:  [62-80] 67  Resp:  [23-56] 23  SpO2:  [93 %-100 %] 100 %  BP: ()/(50-63) 112/61  Arterial Line BP: ()/(55-65) 134/65     Weight: 69.1 kg (152 lb 5.4 oz)  Body mass index is 25.35 kg/m².    Intake/Output Summary (Last 24 hours) at 3/23/2020 1818  Last data filed at 3/23/2020 1700  Gross per 24 hour   Intake 412.79 ml   Output 2200 ml   Net -1787.21 ml      Physical Exam   Constitutional: He is sedated and intubated.   Frail  male in no acute distress   HENT:   Head: Normocephalic and atraumatic.   Eyes: Conjunctivae are normal. No scleral icterus.   Neck: Neck supple. No thyromegaly present.   Cardiovascular: Normal rate, regular rhythm, normal heart sounds and intact distal pulses.   Pulmonary/Chest: He is intubated.   Scattered coarse rhonchi (R>L)   Abdominal: Soft. Bowel sounds are normal. He exhibits no distension.   Musculoskeletal: He exhibits deformity. He exhibits no edema.   Mild contractures to upper and lower extremities   Neurological: He displays no atrophy.   Sedated    Skin: Skin is warm and dry. Capillary refill takes less than 2 seconds. He is not diaphoretic.   Psychiatric:   Unable to assess   Nursing note and vitals reviewed.      Significant Labs:   CBC:   Recent Labs   Lab 03/22/20  0306  03/23/20  0327 03/23/20  0419 03/23/20  1751   WBC 4.69  --   --  3.19*  --    HGB 10.7*  --   --  7.7*  --    HCT 32.5*   < > 28* 23.1* 28*     --   --  114*  --     < > = values in this interval not displayed.     CMP:   Recent Labs   Lab 03/22/20  0306      K 3.3*      CO2 20*      BUN 20   CREATININE 0.6   CALCIUM 8.5*   ANIONGAP 13   EGFRNONAA >60.0     X-Ray Chest AP Portable [[id number]] Resulted: 03/23/20 0801   Order Status: Completed Updated: 03/23/20 0804   Narrative:     EXAMINATION:  XR CHEST AP PORTABLE    CLINICAL HISTORY:  Respiratory failure    TECHNIQUE:  Single frontal view of the chest is performed    COMPARISON:  Chest x-ray 03/22/2020, 03/20/2020, 03/19/2020, and 06/04/2014    FINDINGS:  Endotracheal tube remains in satisfactory position.  There is an enteric tube with the distal end extending off the field of view.    There is bilateral lung opacity in the parahilar regions and upper lungs which consist of interstitial and patchy airspace opacity.  There is question of slight improvement compared to 03/22/2020    Heart is not enlarged    There are no significant osseous abnormalities.   Impression:       Satisfactory position of the endotracheal tube.    Persistent bilateral lung opacity with question of slight improvement compared to 03/22/2020      Electronically signed by: Dane Guevara MD  Date: 03/23/2020  Time: 08:01         Microbiology Results (last 7 days)     Procedure Component Value Units Date/Time    Culture, Respiratory with Gram Stain [[id number]] Collected:  03/22/20 1401    Order Status:  Completed Specimen:  Respiratory from Tracheal Aspirate Updated:  03/23/20 0705     Respiratory Culture No Growth     Gram Stain  (Respiratory) <10 epithelial cells per low power field.     Gram Stain (Respiratory) Few WBC's     Gram Stain (Respiratory) Rare Gram positive cocci    Blood culture x two cultures. Draw prior to antibiotics. [[id number]] Collected:  03/19/20 2005    Order Status:  Completed Specimen:  Blood from Peripheral, Antecubital, Right Updated:  03/22/20 2232     Blood Culture, Routine No Growth to date      No Growth to date      No Growth to date      No Growth to date    Narrative:       Aerobic and anaerobic    Blood culture x two cultures. Draw prior to antibiotics. [[id number]] Collected:  03/19/20 1930    Order Status:  Completed Specimen:  Blood from Peripheral, Upper Arm, Right Updated:  03/22/20 2232     Blood Culture, Routine No Growth to date      No Growth to date      No Growth to date      No Growth to date    Narrative:       Aerobic and anaerobic    Culture, Respiratory with Gram Stain [[id number]] Collected:  03/22/20 1401    Order Status:  Canceled Specimen:  Respiratory from Tracheal Aspirate     Strep A culture, throat [[id number]] Collected:  03/19/20 2000    Order Status:  Completed Specimen:  Throat Updated:  03/22/20 0843     Strep A Culture No significant growth      No  Group A  Streptococcus isolated    Urine culture [[id number]] Collected:  03/19/20 2103    Order Status:  Completed Specimen:  Urine, Clean Catch Updated:  03/22/20 0749     Urine Culture, Routine No growth    Narrative:       Indicated criteria for high risk culture:->Other  Other (specify):->possible sepsis    MRSA Screen by PCR [[id number]] Collected:  03/19/20 2210    Order Status:  Completed Specimen:  Nasopharyngeal Swab from Nasal Updated:  03/20/20 0033     MRSA SCREEN BY PCR Negative    Throat Screen, Rapid [[id number]] Collected:  03/19/20 2000    Order Status:  Completed Specimen:  Throat Updated:  03/19/20 2041     Rapid Strep A Screen Negative     Comment: See Micro for reflexed Strep culture.               Assessment/Plan:       Active Hospital Problems    Diagnosis  POA    *Acute hypoxemic respiratory failure [J96.01]  Yes    Suspected Covid-19 Virus Infection [R68.89]  Yes     Priority: 2     Hypokalemia [E87.6]  No    Anemia [D64.9]  Yes    Pneumonia of both lungs due to infectious organism [J18.9]  Yes    Pharyngeal dysphagia [R13.13]  Yes    CVA (cerebral vascular accident) [I63.9]  Yes     Chronic    Gait instability [R26.81]  Yes    Aspiration pneumonia [J69.0]  Yes    HTN (hypertension) [I10]  Yes      Resolved Hospital Problems   No resolved problems to display.       Plan:  ICU care; mechanical ventilation for acute respiratory failure - pulmonology following   Continue piperacillin-tazobactam; ID following   Continue hydroxychloroquine for suspected COVID-19; PCR is currently pending  Recent MBSS from 12/2019 with severe pharyngeal dysphagia  Discussed with sister Veronica - as per patient's wishes; he does not want a feeding tube   Air bone and droplet isolation precautions for suspected COVID-19  Continue home medications for chronic medical conditions  QTc monitoring while on hydroxychloroquine. Will hold home mirtazapine for now      VTE Risk Mitigation (From admission, onward)         Ordered     enoxaparin injection 40 mg  Every 24 hours (non-standard times)      03/20/20 1439     IP VTE LOW RISK PATIENT  Once      03/20/20 0247     Place sequential compression device  Until discontinued      03/20/20 0247                      Elie Mcghee MD  Department of Hospital Medicine   Former[facility]

## 2020-03-23 NOTE — CARE UPDATE
03/23/20 1659   PRE-TX-O2   Oxygen Concentration (%) 30   SpO2 100 %   Pulse 72   Resp (!) 26   Vent Select   Charged w/in last 24h YES   Preset Conventional Ventilator Settings   Ventilation Type VC   Vent Mode Spont   Vt Set 350 mL   PEEP/CPAP 5 cmH20   Pressure Support 15 cmH20   Waveform RAMP   Peak Flow 60 L/min   Plateau Set/Insp. Hold (sec) 0   Insp Rise Time  50 %   Trigger Sensitivity Flow/I-Trigger 3 L/min   Patient Ventilator Parameters   Resp Rate Total 20 br/min   Peak Airway Pressure 21 cmH2O   Mean Airway Pressure 11 cmH20   Plateau Pressure 12 cmH20   Exhaled Vt 504 mL   Total Ve 10.1 mL   Spont Ve 10.1 L   I:E Ratio Measured 1:1.80   Conventional Ventilator Alarms   Resp Rate High Alarm 50 br/min   Press High Alarm 50 cmH2O   Apnea Rate 10   Apnea Volume (mL) 1 mL   Apnea Oxygen Concentration  100   Apnea Flow Rate (L/min) 60   T Apnea 20 sec(s)   Ready to Wean/Extubation Screen   FIO2<=50 (chart decimal) 0.3   MV<16L (chart vol.) 10.1   PEEP <=8 (chart #) 5   Ready to Wean Parameters   F/VT Ratio<105 (RSBI) (!) 51.59

## 2020-03-23 NOTE — PROGRESS NOTES
"[facility]  Adult Nutrition   Progress Note (Follow-Up)    SUMMARY     Recommendations  Recommendation/Intervention: 1) If unable to extubate, recommend starting TF with Pulmocare at 20ml/hr and advance to goal rate 30ml/hr. RD will continue to monitor.   Goals: Patient will receive some form of nutrition within 24hrs.  Nutrition Goal Status: goal not met, new  Communication of RD Recs: reviewed with RN    Dietitian Rounds Brief  Patient is intubated with possible extubation today.     Reason for Assessment  Reason For Assessment: RD follow-up    Nutrition Risk Screen  Nutrition Risk Screen: dysphagia or difficulty swallowing     MST Score: 2  Have you recently lost weight without trying?: Unsure  Weight loss score: 2  Have you been eating poorly because of a decreased appetite?: No(Unable to assess)  Appetite score: 0     Nutrition/Diet History  Spiritual, Cultural Beliefs, Hinduism Practices, Values that Affect Care: other (see comments)(pt non verbal)  Food Allergies: NKFA  Factors Affecting Nutritional Intake: difficulty/impaired swallowing    Anthropometrics  Temp: 98 °F (36.7 °C)  Height Method: Estimated  Height: 5' 5" (165.1 cm)  Height (inches): 65 in  Weight Method: Bed Scale  Weight: 69.1 kg (152 lb 5.4 oz)  Weight (lb): 152.34 lb  Ideal Body Weight (IBW), Male: 136 lb  % Ideal Body Weight, Male (lb): 109.1 %  BMI (Calculated): 25.4  BMI Grade: 25 - 29.9 - overweight     Weight History:  Wt Readings from Last 10 Encounters:   03/20/20 69.1 kg (152 lb 5.4 oz)   03/20/20 69.1 kg (152 lb 5.4 oz)   12/12/19 68 kg (150 lb)   09/13/16 81.6 kg (180 lb)       Lab/Procedures/Meds: Pertinent Labs Reviewed  Clinical Chemistry:  Recent Labs   Lab 03/19/20  1930 03/20/20  0416 03/21/20  0347 03/22/20  0306    142 143 141   K 4.1 3.6 3.5 3.3*    113* 112* 108   CO2 24 24 23 20*    152* 115* 103   BUN 22 16 19 20   CREATININE 0.7 0.6 0.7 0.6   CALCIUM 9.1 8.3* 8.6* 8.5*   PROT 7.3  --  "  --   --    ALBUMIN 3.9  --   --   --    BILITOT 0.6  --   --   --    ALKPHOS 70  --   --   --    AST 24  --   --   --    ALT 16  --   --   --    ANIONGAP 11 5* 8 13   ESTGFRAFRICA >60.0 >60.0 >60.0 >60.0   EGFRNONAA >60.0 >60.0 >60.0 >60.0   MG 2.1 2.1 2.2 2.1   PHOS 3.3 3.8 3.2 3.6   LIPASE 35  --   --   --      CBC:   Recent Labs   Lab 03/23/20  0419   WBC 3.19*   RBC 2.39*   HGB 7.7*   HCT 23.1*   *   MCV 97   MCH 32.2*   MCHC 33.3     Cardiac Profile:  Recent Labs   Lab 03/19/20  1930 03/22/20  0306   BNP 13  --    TROPONINI <0.030 <0.030     Inflammatory Labs:  Recent Labs   Lab 03/22/20  0306   CRP 7.64*     Medications: Pertinent Medications reviewed  Scheduled Meds:   aspirin  81 mg Per G Tube Daily    azithromycin  500 mg Intravenous Q24H    carbidopa-levodopa  mg  1 tablet Oral TID    chlorhexidine  15 mL Mouth/Throat BID    enoxparin  40 mg Subcutaneous Q24H    famotidine (PF)  20 mg Intravenous BID    hydroxychloroquine  200 mg Oral BID    lisinopriL  10 mg Oral QHS    mupirocin   Nasal BID    piperacillin-tazobactam (ZOSYN) IVPB  3.375 g Intravenous Q8H     Continuous Infusions:   sodium chloride 0.9% 75 mL/hr at 03/22/20 1858    propofoL 50 mcg/kg/min (03/23/20 1243)     Estimated/Assessed Needs  Weight Used For Calorie Calculations: 69.1 kg (152 lb 5.4 oz)  Energy Calorie Requirements (kcal): 3526-3717 kcals/day (25-30 kcals/kg)  Energy Need Method: Kcal/kg  Protein Requirements: 69-90 g/day (1.0-1.3 g/kg)  Weight Used For Protein Calculations: 69.1 kg (152 lb 5.4 oz)     Estimated Fluid Requirement Method: RDA Method  RDA Method (mL): 1727     Nutrition Prescription Ordered  Current Diet Order: NPO    Evaluation of Received Nutrient/Fluid Intake  Other Calories (kcal): 546(propofol at 20.7ml/hr)  Energy Calories Required: not meeting needs  Protein Required: not meeting needs  Comments: Patient is intubated with possible extubation today.   Tolerance: (NPO)     Intake/Output  Summary (Last 24 hours) at 3/23/2020 1651  Last data filed at 3/23/2020 0425  Gross per 24 hour   Intake 3069.27 ml   Output 950 ml   Net 2119.27 ml      % Intake of Estimated Energy Needs: 25 - 50 %  % Meal Intake: NPO    Nutrition Risk  Level of Risk/Frequency of Follow-up: high     Monitor and Evaluation  Food and Nutrient Intake: energy intake, food and beverage intake  Food and Nutrient Adminstration: diet order  Physical Activity and Function: nutrition-related ADLs and IADLs, factors affecting access to physical activity  Anthropometric Measurements: weight, weight change, body mass index  Biochemical Data, Medical Tests and Procedures: electrolyte and renal panel, glucose/endocrine profile, lipid profile, gastrointestinal profile, inflammatory profile  Nutrition-Focused Physical Findings: overall appearance     Nutrition Follow-Up  RD Follow-up?: Yes

## 2020-03-24 LAB
ALLENS TEST: ABNORMAL
ALLENS TEST: ABNORMAL
ANION GAP SERPL CALC-SCNC: 9 MMOL/L (ref 8–16)
BACTERIA BLD CULT: NORMAL
BACTERIA BLD CULT: NORMAL
BACTERIA SPEC AEROBE CULT: NORMAL
BASOPHILS # BLD AUTO: 0.01 K/UL (ref 0–0.2)
BASOPHILS NFR BLD: 0.3 % (ref 0–1.9)
BUN SERPL-MCNC: 10 MG/DL (ref 8–23)
CALCIUM SERPL-MCNC: 8.4 MG/DL (ref 8.7–10.5)
CHLORIDE SERPL-SCNC: 111 MMOL/L (ref 95–110)
CO2 SERPL-SCNC: 22 MMOL/L (ref 23–29)
CREAT SERPL-MCNC: 0.5 MG/DL (ref 0.5–1.4)
DELSYS: ABNORMAL
DELSYS: ABNORMAL
DIFFERENTIAL METHOD: ABNORMAL
EOSINOPHIL # BLD AUTO: 0.1 K/UL (ref 0–0.5)
EOSINOPHIL NFR BLD: 2 % (ref 0–8)
ERYTHROCYTE [DISTWIDTH] IN BLOOD BY AUTOMATED COUNT: 12.5 % (ref 11.5–14.5)
ERYTHROCYTE [SEDIMENTATION RATE] IN BLOOD BY WESTERGREN METHOD: 16 MM/H
EST. GFR  (AFRICAN AMERICAN): >60 ML/MIN/1.73 M^2
EST. GFR  (NON AFRICAN AMERICAN): >60 ML/MIN/1.73 M^2
FIO2: 30
FIO2: 30
GLUCOSE SERPL-MCNC: 129 MG/DL (ref 70–110)
GLUCOSE SERPL-MCNC: 89 MG/DL (ref 70–110)
GLUCOSE SERPL-MCNC: 90 MG/DL (ref 70–110)
GLUCOSE SERPL-MCNC: 91 MG/DL (ref 70–110)
GRAM STN SPEC: NORMAL
HCO3 UR-SCNC: 23.4 MMOL/L (ref 24–28)
HCO3 UR-SCNC: 23.6 MMOL/L (ref 24–28)
HCT VFR BLD AUTO: 33.2 % (ref 40–54)
HCT VFR BLD CALC: 30 %PCV (ref 36–54)
HCT VFR BLD CALC: 32 %PCV (ref 36–54)
HGB BLD-MCNC: 10.9 G/DL (ref 14–18)
IMM GRANULOCYTES # BLD AUTO: 0.01 K/UL (ref 0–0.04)
IMM GRANULOCYTES NFR BLD AUTO: 0.3 % (ref 0–0.5)
LYMPHOCYTES # BLD AUTO: 1.1 K/UL (ref 1–4.8)
LYMPHOCYTES NFR BLD: 29.7 % (ref 18–48)
MAGNESIUM SERPL-MCNC: 2.4 MG/DL (ref 1.6–2.6)
MCH RBC QN AUTO: 31.8 PG (ref 27–31)
MCHC RBC AUTO-ENTMCNC: 32.8 G/DL (ref 32–36)
MCV RBC AUTO: 97 FL (ref 82–98)
MODE: ABNORMAL
MODE: ABNORMAL
MONOCYTES # BLD AUTO: 0.3 K/UL (ref 0.3–1)
MONOCYTES NFR BLD: 8.5 % (ref 4–15)
NEUTROPHILS # BLD AUTO: 2.1 K/UL (ref 1.8–7.7)
NEUTROPHILS NFR BLD: 59.2 % (ref 38–73)
NRBC BLD-RTO: 0 /100 WBC
PCO2 BLDA: 35 MMHG (ref 35–45)
PCO2 BLDA: 37.1 MMHG (ref 35–45)
PEEP: 5
PEEP: 5
PH SMN: 7.41 [PH] (ref 7.35–7.45)
PH SMN: 7.43 [PH] (ref 7.35–7.45)
PHOSPHATE SERPL-MCNC: 3.5 MG/DL (ref 2.7–4.5)
PLATELET # BLD AUTO: 184 K/UL (ref 150–350)
PMV BLD AUTO: 10.8 FL (ref 9.2–12.9)
PO2 BLDA: 60 MMHG (ref 80–100)
PO2 BLDA: 80 MMHG (ref 80–100)
POC BE: -1 MMOL/L
POC BE: -1 MMOL/L
POC IONIZED CALCIUM: 1.18 MMOL/L (ref 1.06–1.42)
POC IONIZED CALCIUM: 1.19 MMOL/L (ref 1.06–1.42)
POC SATURATED O2: 92 % (ref 95–100)
POC SATURATED O2: 96 % (ref 95–100)
POC TCO2: 24 MMOL/L (ref 23–27)
POC TCO2: 25 MMOL/L (ref 23–27)
POTASSIUM BLD-SCNC: 3.7 MMOL/L (ref 3.5–5.1)
POTASSIUM BLD-SCNC: 3.8 MMOL/L (ref 3.5–5.1)
POTASSIUM SERPL-SCNC: 3.6 MMOL/L (ref 3.5–5.1)
PS: 10
RBC # BLD AUTO: 3.43 M/UL (ref 4.6–6.2)
SAMPLE: ABNORMAL
SAMPLE: ABNORMAL
SITE: ABNORMAL
SITE: ABNORMAL
SODIUM BLD-SCNC: 142 MMOL/L (ref 136–145)
SODIUM BLD-SCNC: 142 MMOL/L (ref 136–145)
SODIUM SERPL-SCNC: 142 MMOL/L (ref 136–145)
SP02: 94
SP02: 94
SPONT RATE: 37
VT: 350
WBC # BLD AUTO: 3.53 K/UL (ref 3.9–12.7)

## 2020-03-24 PROCEDURE — 83735 ASSAY OF MAGNESIUM: CPT

## 2020-03-24 PROCEDURE — 80048 BASIC METABOLIC PNL TOTAL CA: CPT

## 2020-03-24 PROCEDURE — 63600175 PHARM REV CODE 636 W HCPCS: Performed by: INTERNAL MEDICINE

## 2020-03-24 PROCEDURE — 99900026 HC AIRWAY MAINTENANCE (STAT)

## 2020-03-24 PROCEDURE — 85014 HEMATOCRIT: CPT

## 2020-03-24 PROCEDURE — 63600175 PHARM REV CODE 636 W HCPCS: Performed by: NURSE PRACTITIONER

## 2020-03-24 PROCEDURE — 27000221 HC OXYGEN, UP TO 24 HOURS

## 2020-03-24 PROCEDURE — 84100 ASSAY OF PHOSPHORUS: CPT

## 2020-03-24 PROCEDURE — 84295 ASSAY OF SERUM SODIUM: CPT

## 2020-03-24 PROCEDURE — 25000003 PHARM REV CODE 250: Performed by: INTERNAL MEDICINE

## 2020-03-24 PROCEDURE — 84132 ASSAY OF SERUM POTASSIUM: CPT

## 2020-03-24 PROCEDURE — 82803 BLOOD GASES ANY COMBINATION: CPT

## 2020-03-24 PROCEDURE — 20000000 HC ICU ROOM

## 2020-03-24 PROCEDURE — 37799 UNLISTED PX VASCULAR SURGERY: CPT

## 2020-03-24 PROCEDURE — 21400001 HC TELEMETRY ROOM

## 2020-03-24 PROCEDURE — 85025 COMPLETE CBC W/AUTO DIFF WBC: CPT

## 2020-03-24 PROCEDURE — 99291 CRITICAL CARE FIRST HOUR: CPT | Mod: ,,, | Performed by: INTERNAL MEDICINE

## 2020-03-24 PROCEDURE — B4185 PARENTERAL SOL 10 GM LIPIDS: HCPCS | Performed by: INTERNAL MEDICINE

## 2020-03-24 PROCEDURE — 82330 ASSAY OF CALCIUM: CPT

## 2020-03-24 PROCEDURE — S0028 INJECTION, FAMOTIDINE, 20 MG: HCPCS | Performed by: INTERNAL MEDICINE

## 2020-03-24 PROCEDURE — 94761 N-INVAS EAR/PLS OXIMETRY MLT: CPT

## 2020-03-24 PROCEDURE — 99291 PR CRITICAL CARE, E/M 30-74 MINUTES: ICD-10-PCS | Mod: ,,, | Performed by: INTERNAL MEDICINE

## 2020-03-24 PROCEDURE — 99900035 HC TECH TIME PER 15 MIN (STAT)

## 2020-03-24 PROCEDURE — 36569 INSJ PICC 5 YR+ W/O IMAGING: CPT

## 2020-03-24 PROCEDURE — A4217 STERILE WATER/SALINE, 500 ML: HCPCS | Performed by: INTERNAL MEDICINE

## 2020-03-24 RX ORDER — HYDRALAZINE HYDROCHLORIDE 20 MG/ML
10 INJECTION INTRAMUSCULAR; INTRAVENOUS EVERY 8 HOURS PRN
Status: DISCONTINUED | OUTPATIENT
Start: 2020-03-24 | End: 2020-03-29

## 2020-03-24 RX ADMIN — MUPIROCIN: 20 OINTMENT TOPICAL at 09:03

## 2020-03-24 RX ADMIN — AZITHROMYCIN MONOHYDRATE 500 MG: 500 INJECTION, POWDER, LYOPHILIZED, FOR SOLUTION INTRAVENOUS at 09:03

## 2020-03-24 RX ADMIN — PIPERACILLIN AND TAZOBACTAM 3.38 G: 3; .375 INJECTION, POWDER, FOR SOLUTION INTRAVENOUS at 03:03

## 2020-03-24 RX ADMIN — CHLORHEXIDINE GLUCONATE 15 ML: 1.2 RINSE ORAL at 11:03

## 2020-03-24 RX ADMIN — CHLORHEXIDINE GLUCONATE 15 ML: 1.2 RINSE ORAL at 09:03

## 2020-03-24 RX ADMIN — FAMOTIDINE 20 MG: 10 INJECTION INTRAVENOUS at 09:03

## 2020-03-24 RX ADMIN — PROPOFOL 20 MCG/KG/MIN: 10 INJECTION, EMULSION INTRAVENOUS at 04:03

## 2020-03-24 RX ADMIN — HYDRALAZINE HYDROCHLORIDE 10 MG: 20 INJECTION INTRAMUSCULAR; INTRAVENOUS at 09:03

## 2020-03-24 RX ADMIN — THIAMINE HYDROCHLORIDE: 100 INJECTION, SOLUTION INTRAMUSCULAR; INTRAVENOUS at 08:03

## 2020-03-24 RX ADMIN — FAMOTIDINE 20 MG: 10 INJECTION INTRAVENOUS at 11:03

## 2020-03-24 RX ADMIN — PIPERACILLIN AND TAZOBACTAM 3.38 G: 3; .375 INJECTION, POWDER, FOR SOLUTION INTRAVENOUS at 09:03

## 2020-03-24 NOTE — PROGRESS NOTES
[facility] Medicine  Progress Note    Patient Name: Mukesh Addison  MRN: [id number]  Patient Class: IP- Inpatient   Admission Date: 3/19/2020  Length of Stay: 4 days  Attending Physician: Elie Mcghee MD  Primary Care Provider: Primary Doctor No        Subjective:     Principal Problem:Acute hypoxemic respiratory failure      Interval History:  No acute overnight events reported. S/p successful extubation earlier today.  Saturating comfortably on 2 L of oxygen via nasal cannula.  He is nonverbal at baseline. Appears to be in no distress.    Review of Systems   Unable to perform ROS: Patient nonverbal     Objective:     Vital Signs (Most Recent):  Temp: 98.4 °F (36.9 °C) (03/24/20 0030)  Pulse: 84 (03/24/20 0710)  Resp: (!) 37 (03/24/20 0710)  BP: 101/61 (03/24/20 0203)  SpO2: (!) 94 % (03/24/20 0710) Vital Signs (24h Range):  Temp:  [97.8 °F (36.6 °C)-98.4 °F (36.9 °C)] 98.4 °F (36.9 °C)  Pulse:  [57-84] 84  Resp:  [19-40] 37  SpO2:  [93 %-100 %] 94 %  BP: (101-124)/(56-67) 101/61  Arterial Line BP: (121-163)/(61-71) 149/67     Weight: 69.1 kg (152 lb 5.4 oz)  Body mass index is 25.35 kg/m².    Intake/Output Summary (Last 24 hours) at 3/24/2020 1544  Last data filed at 3/23/2020 1700  Gross per 24 hour   Intake 412.79 ml   Output 1700 ml   Net -1287.21 ml      Physical Exam   Constitutional:   Frail  male in no acute distress   HENT:   Head: Normocephalic and atraumatic.   Eyes: Conjunctivae are normal. No scleral icterus.   Neck: Neck supple. No thyromegaly present.   Cardiovascular: Normal rate, regular rhythm, normal heart sounds and intact distal pulses.   Pulmonary/Chest:   Scattered coarse rhonchi (R>L)   Abdominal: Soft. Bowel sounds are normal. He exhibits no distension.   Musculoskeletal: He exhibits deformity. He exhibits no edema.   Mild contractures to upper and lower extremities   Neurological: He is alert. He displays no atrophy.   Non-focal    Skin: Skin is warm and  dry. Capillary refill takes less than 2 seconds. He is not diaphoretic.   Psychiatric: He has a normal mood and affect. His behavior is normal.   Nursing note and vitals reviewed.      Significant Labs:   CBC:   Recent Labs   Lab 03/23/20  0419  03/24/20  0409 03/24/20  0410 03/24/20  0524   WBC 3.19*  --   --  3.53*  --    HGB 7.7*  --   --  10.9*  --    HCT 23.1*   < > 30* 33.2* 32*   *  --   --  184  --     < > = values in this interval not displayed.     CMP:   Recent Labs   Lab 03/24/20  0410      K 3.6   *   CO2 22*   GLU 91   BUN 10   CREATININE 0.5   CALCIUM 8.4*   ANIONGAP 9   EGFRNONAA >60.0     Significant Imaging:    X-Ray KUB [[id number]] Resulted: 03/24/20 1458   Order Status: Completed Updated: 03/24/20 1500   Narrative:     EXAMINATION:  XR KUB    CLINICAL HISTORY:  abnormal bowel gas patern;    COMPARISON:  None    FINDINGS:  Moderate to large volume fecal material within the rectum.  Gas is present within the colon.  Mild gaseous distension of small bowel loops, without felice dilation.  No free intraperitoneal air (noting the upper abdomen was excluded from this exam) or pneumatosis.  No radiopaque urinary calculi.  No acute osseous abnormality.   Impression:       Mild distention of bowel loops suggesting ileus or constipation considering rectal fecal material.      Electronically signed by: Cresencio Shaw MD  Date: 03/24/2020  Time: 14:58     X-Ray Chest 1 View for PICC_Central line [[id number]] Resulted: 03/24/20 1023   Order Status: Completed Updated: 03/24/20 1025   Narrative:     EXAMINATION:  XR CHEST 1 VIEW    CLINICAL HISTORY:  Evaluate PICC line placement;    COMPARISON:  03/24/2020    FINDINGS:  Cardiac silhouette size is stable compared to prior.  Left PICC has been placed.  Tip projects over the expected location of the SVC.  Dobbhoff tube has been repositioned, and currently tip is positioned within the right lower lobe.  Bilateral interstitial and alveolar opacities  potentially reflecting viral pneumonia or stable compared to prior.  Endotracheal tube has been removed.   Impression:       Malpositioned feeding tube within right lower lobe.  I called these findings to the ICU nurse caring for the patient at 10:20 am and he reported the tube has already been removed.    Appropriate positioning of right PICC.    Stable interstitial and alveolar opacities within both lungs.      Electronically signed by: Cresencio Shaw MD  Date: 03/24/2020  Time: 10:23         Microbiology Results (last 7 days)     Procedure Component Value Units Date/Time    Culture, Respiratory with Gram Stain [[id number]] Collected:  03/22/20 1401    Order Status:  Completed Specimen:  Respiratory from Tracheal Aspirate Updated:  03/24/20 0713     Respiratory Culture Reduced normal respiratory indu     Gram Stain (Respiratory) <10 epithelial cells per low power field.     Gram Stain (Respiratory) Few WBC's     Gram Stain (Respiratory) Rare Gram positive cocci    Blood culture x two cultures. Draw prior to antibiotics. [[id number]] Collected:  03/19/20 1930    Order Status:  Completed Specimen:  Blood from Peripheral, Upper Arm, Right Updated:  03/23/20 2232     Blood Culture, Routine No Growth to date      No Growth to date      No Growth to date      No Growth to date      No Growth to date    Narrative:       Aerobic and anaerobic    Blood culture x two cultures. Draw prior to antibiotics. [[id number]] Collected:  03/19/20 2005    Order Status:  Completed Specimen:  Blood from Peripheral, Antecubital, Right Updated:  03/23/20 2232     Blood Culture, Routine No Growth to date      No Growth to date      No Growth to date      No Growth to date      No Growth to date    Narrative:       Aerobic and anaerobic    Culture, Respiratory with Gram Stain [[id number]] Collected:  03/22/20 1401    Order Status:  Canceled Specimen:  Respiratory from Tracheal Aspirate     Strep A culture, throat [[id number]] Collected:   03/19/20 2000    Order Status:  Completed Specimen:  Throat Updated:  03/22/20 0843     Strep A Culture No significant growth      No  Group A  Streptococcus isolated    Urine culture [[id number]] Collected:  03/19/20 2103    Order Status:  Completed Specimen:  Urine, Clean Catch Updated:  03/22/20 0749     Urine Culture, Routine No growth    Narrative:       Indicated criteria for high risk culture:->Other  Other (specify):->possible sepsis    MRSA Screen by PCR [[id number]] Collected:  03/19/20 2210    Order Status:  Completed Specimen:  Nasopharyngeal Swab from Nasal Updated:  03/20/20 0033     MRSA SCREEN BY PCR Negative    Throat Screen, Rapid [[id number]] Collected:  03/19/20 2000    Order Status:  Completed Specimen:  Throat Updated:  03/19/20 2041     Rapid Strep A Screen Negative     Comment: See Micro for reflexed Strep culture.               Assessment/Plan:      Active Hospital Problems    Diagnosis  POA    *Acute hypoxemic respiratory failure [J96.01]  Yes    Suspected Covid-19 Virus Infection [R68.89]  Yes     Priority: 2     Hypokalemia [E87.6]  No    Anemia [D64.9]  Yes    Pneumonia of both lungs due to infectious organism [J18.9]  Yes    Pharyngeal dysphagia [R13.13]  Yes    CVA (cerebral vascular accident) [I63.9]  Yes     Chronic    Gait instability [R26.81]  Yes    Aspiration pneumonia [J69.0]  Yes    HTN (hypertension) [I10]  Yes      Resolved Hospital Problems   No resolved problems to display.       Plan:  ICU care; successful extubation today  Continue to wean supplemental oxygen  Continue piperacillin-tazobactam for possible aspiration pneumonia; ID following   Continue hydroxychloroquine for suspected COVID-19; PCR is currently pending  Recent MBSS from 12/2019 with severe pharyngeal dysphagia  Family not ready for PEG tube; start TPN as per dietary recommendations  Air bone and droplet isolation precautions for suspected COVID-19  Continue home medications for chronic medical  conditions  QTc monitoring while on hydroxychloroquine (normal QTc on EKG 03/23). Will hold home mirtazapine for now    Dispo:  Pending COVID-19 testing after which patient can be transferred back to his nursing home. Long term plan for nutrition is unclear at this point       VTE Risk Mitigation (From admission, onward)         Ordered     enoxaparin injection 40 mg  Every 24 hours (non-standard times)      03/20/20 1439     IP VTE LOW RISK PATIENT  Once      03/20/20 0247     Place sequential compression device  Until discontinued      03/20/20 0247                      Elie Mcghee MD  Department of Hospital Medicine   [facility]

## 2020-03-24 NOTE — PLAN OF CARE
Problem: Parenteral Nutrition  Goal: Effective Intravenous Nutrition Therapy Delivery  Outcome: Ongoing, Progressing  New TPN ordered to be started at 1700.

## 2020-03-24 NOTE — RESPIRATORY THERAPY
03/23/20 2100   PRE-TX-O2   Oxygen Concentration (%) 30   SpO2 (!) 94 %   Pulse 76   Resp (!) 40   BP (!) 117/59   Vent Select   Charged w/in last 24h YES   Preset Conventional Ventilator Settings   Ventilation Type VC   Vent Mode A/C   Set Rate 16 BPM   Vt Set 350 mL   PEEP/CPAP 5 cmH20   Pressure Support 0 cmH20   Waveform RAMP   Peak Flow 60 L/min   Plateau Set/Insp. Hold (sec) 0   Trigger Sensitivity Flow/I-Trigger 3 L/min   Patient Ventilator Parameters   Resp Rate Total 39 br/min   Peak Airway Pressure 14 cmH2O   Mean Airway Pressure 6.5 cmH20   Plateau Pressure 12 cmH20   Exhaled Vt 372 mL   Total Ve 14.6 mL   I:E Ratio Measured 1:1.40   Conventional Ventilator Alarms   Resp Rate High Alarm 50 br/min   Press High Alarm 50 cmH2O   Apnea Rate 10   Apnea Volume (mL) 1 mL   Apnea Oxygen Concentration  100   Apnea Flow Rate (L/min) 60   T Apnea 20 sec(s)   Ready to Wean/Extubation Screen   FIO2<=50 (chart decimal) 0.3   MV<16L (chart vol.) 14.6   PEEP <=8 (chart #) 5   Ready to Wean Parameters   F/VT Ratio<105 (RSBI) 107.53       Per Dr. Taylor, placed back on rate will try cpap trial again in morning. Back on sedation.

## 2020-03-24 NOTE — CONSULTS
GASTROENTEROLOGY INPATIENT CONSULT NOTE  Patient Name: Mukesh Addison  Patient MRN: [id number]  Patient : 1950    Admit Date: 3/19/2020  Service date: 3/24/2020    Reason for Consult: PEG eval    PCP: Primary Doctor No    Chief Complaint   Patient presents with    Shortness of Breath    Fever       HPI: Patient is a 69 y.o. male with PMHx DM, HTN / CVA (ASA), GERD, parkinson's presents for evaluation of SOB / weakness. Acute onset, constant, progressive and required mechanical ventilation. Currently being ruled out for Covid. Extubated overnight and unable to pass NGT due to altered mental status. Sister (Veronica) endorses chronic issues w/ PO due to combination of multiple CVAs and Parkinson's disease. States was told he needed a PEG as outpatient even before these events which he refused adamantly prior to this recent decompensation.   [phone] Veronica Addison (sister)    CHART REVIEW:  CT Chest 3/'20 - multifocal PNA; vascular dz; gallstones; good window for PEG    Past Medical History:  Past Medical History:   Diagnosis Date    Diabetes mellitus     GERD (gastroesophageal reflux disease)     Hypertension     Stroke         Past Surgical History:  History reviewed. No pertinent surgical history.     Home Medications:  Medications Prior to Admission   Medication Sig Dispense Refill Last Dose    acetaminophen (TYLENOL) 650 MG TbSR Take 650 mg by mouth every 6 (six) hours as needed.   3/9/2020    aspirin (ECOTRIN) 81 MG EC tablet Take 81 mg by mouth once daily.   3/19/2020 at 07:00    carbidopa-levodopa  mg (SINEMET)  mg per tablet Take 1 tablet by mouth 3 (three) times daily.   3/19/2020 at 12;00    carbidopa-levodopa  mg (SINEMET CR)  mg TbSR Take 2 tablets by mouth 3 (three) times daily.   3/19/2020 at 12:00    dext 70/polycarbophil/peg/NaCl (ARTIFICIAL TEAR SOLUTION OPHT) Place 2 drops into both eyes 2 (two) times daily.   3/19/2020 at 07:00    dextromethorphan-quinidine  20-10 mg (NUEDEXTA) 20-10 mg per capsule Take 1 capsule by mouth 2 (two) times daily.   3/19/2020 at 07:00    ketoconazole (NIZORAL) 2 % shampoo Apply 1 application topically every Mon, Wed, Fri.   3/18/2020 at 06:00    lisinopriL 10 MG tablet Take 10 mg by mouth every evening.   3/18/2020 at 16:00    mirtazapine (REMERON) 15 MG tablet Take 15 mg by mouth every evening.   3/19/2020 at 16:00    multivitamin (THERAGRAN) per tablet Take 1 tablet by mouth once daily.   3/19/2020 at 07:00    soap (BABY SHAMPOO TOP) Apply 1 application topically 2 (two) times daily.   3/19/2020 at 06:00    triamcinolone acetonide 0.025% (KENALOG) 0.025 % cream Apply 1 application topically 2 (two) times daily.   3/19/2020 at 06:00    vitamin D (VITAMIN D3) 1000 units Tab Take 1,000 Units by mouth once daily.   3/19/2020 at 07:00       Inpatient Medications:   aspirin  81 mg Per G Tube Daily    azithromycin  500 mg Intravenous Q24H    carbidopa-levodopa  mg  1 tablet Oral TID    chlorhexidine  15 mL Mouth/Throat BID    enoxparin  40 mg Subcutaneous Q24H    famotidine (PF)  20 mg Intravenous BID    hydroxychloroquine  200 mg Oral BID    lisinopriL  10 mg Oral QHS    mupirocin   Nasal BID    piperacillin-tazobactam (ZOSYN) IVPB  3.375 g Intravenous Q8H     acetaminophen, acetaminophen, albuterol-ipratropium, dextrose 50%, dextrose 50%, hydrALAZINE, insulin regular, magnesium oxide, magnesium oxide, potassium chloride 10%, potassium chloride 10%, potassium chloride 10%, potassium, sodium phosphates, potassium, sodium phosphates, potassium, sodium phosphates, sodium chloride 0.9%    Review of patient's allergies indicates:  No Known Allergies    Social History:   Social History     Occupational History    Not on file   Tobacco Use    Smoking status: Never Smoker    Smokeless tobacco: Never Used   Substance and Sexual Activity    Alcohol use: Not on file    Drug use: Not on file    Sexual activity: Not on file  "      Family History:   History reviewed. No pertinent family history.    Review of Systems:  A 10 point review of systems was unable to be obtained due to ventilator    OBJECTIVE:    Physical Exam:  24 Hour Vital Sign Ranges: Temp:  [97.7 °F (36.5 °C)-98.4 °F (36.9 °C)] 98.4 °F (36.9 °C)  Pulse:  [57-84] 84  Resp:  [19-40] 37  SpO2:  [93 %-100 %] 94 %  BP: (101-124)/(56-67) 101/61  Arterial Line BP: (121-163)/(61-71) 149/67  Most recent vitals: /61   Pulse 84   Temp 98.4 °F (36.9 °C)   Resp (!) 37   Ht 5' 5" (1.651 m)   Wt 69.1 kg (152 lb 5.4 oz)   SpO2 (!) 94%   BMI 25.35 kg/m²    GEN: somnolent and sleeping in bed  HEENT: NCAT. sclera anicteric, oral mucosa pink and moist without lesion  NECK: trachea midline; Good ROM  CV: regular rate and rhythm, no murmurs or gallops  RESP: coarse but moving air  ABD: soft, non-tender, non-distended, normal bowel sounds  EXT: no swelling or edema, 1+ pulses distally  SKIN: no rashes or jaundice  PSYCH: n/a    Labs:   Recent Labs     03/22/20  0306 03/23/20  0419 03/24/20  0410   WBC 4.69 3.19* 3.53*   MCV 96 97 97    114* 184     Recent Labs     03/22/20  0306 03/24/20  0410    142   K 3.3* 3.6    111*   CO2 20* 22*   BUN 20 10    91     No results for input(s): ALB in the last 72 hours.    Invalid input(s): ALKP, SGOT, SGPT, TBIL, DBIL, TPRO  Recent Labs     03/22/20  0306   INR 1.1         Radiology Review:  X-Ray Chest 1 View for PICC_Central line   Final Result      Malpositioned feeding tube within right lower lobe.  I called these findings to the ICU nurse caring for the patient at 10:20 am and he reported the tube has already been removed.      Appropriate positioning of right PICC.      Stable interstitial and alveolar opacities within both lungs.         Electronically signed by: Cresencio Shaw MD   Date:    03/24/2020   Time:    10:23      X-Ray Chest AP Portable   Final Result      X-Ray Chest AP Portable   Final Result    "   Satisfactory position of the endotracheal tube.      Persistent bilateral lung opacity with question of slight improvement compared to 03/22/2020         Electronically signed by: Dane Guevara MD   Date:    03/23/2020   Time:    08:01      X-Ray Chest AP Portable   Final Result      Progression of the interstitial and alveolar opacities in both lungs predominantly in the upper and mid lungs.  Differential includes viral or atypical pneumonia versus edema         Electronically signed by: Roz Eugene MD   Date:    03/22/2020   Time:    07:10      CTA Chest Non Coronary   Final Result      Negative for central pulmonary embolism.  Limited assessment of peripheral pulmonary arteries due to motion artifact.      Patchy opacities within both lungs consistent with multifocal pneumonia.      Atherosclerosis, cholelithiasis, and other incidental findings as above.         Electronically signed by: Cresencio Shaw MD   Date:    03/20/2020   Time:    10:00      X-Ray Chest AP Portable   Final Result      Scattered interstitial opacities throughout both lungs, which given clinical history are suggestive of viral or atypical pneumonia.  No consolidated pneumonia.         Electronically signed by: Alfred Ponce MD   Date:    03/20/2020   Time:    07:52      X-Ray Chest AP Portable   Final Result      1. Faint bilateral pulmonary opacities suspicious for bilateral pulmonary infiltrates, right greater than left.   2. No other significant findings.         Electronically signed by: Gavino Lazo MD   Date:    03/19/2020   Time:    19:41            IMPRESSION / RECOMMENDATIONS:  69 y.o. male with PMHx DM, HTN / CVA (ASA), GERD, parkinson's presents for evaluation of SOB / weakness in setting of COVID vs aspiration PNA. Likely acute / chronic issues based on conversations w/ family. Risks, benefits, alternative discussed in detail with patient / family regarding anticipated procedure and possible complications. At this  point, she is not ready to get PEG as she knows he was opposed to it. Discussed that PEG may not change clinical outcome given recent decompensation in setting of known neurologic issues w/ CVA / parkinson's    -Conservative for now regarding PEG  -Can continue attempts to place NGT to see if any clinical improvement    Thank you for this consult.    Kevin KAPLAN Dauterive III  3/24/2020  12:13 PM

## 2020-03-24 NOTE — PROGRESS NOTES
Consult Note  Infectious Disease    Reason for Consult:  Worsening respiratory failure, suspect COVID and aspiration    HPI: Mukesh Addison is a   69 y.o. male who was brought from the nursing home on 03/19 with a temperature of 103°, shortness of breath and concern for aspiration pneumonia.  Initial chest x-ray had right greater than left basilar infiltrate.  Testing for corona virus was performed, he was isolated and started on broad-spectrum antibiotics for aspiration pneumonia, including rocephin and  azithromycin.  Initial WBC 4.6, procal normal, lactic acid normal, flu negative, MRSA screen neg, CXR and CT chest more consistent with RUL focal pneumonia, but  Breathing and CXR worsened today after eating and he was moved to ICU and intubated. Blood and urine cultures from admit are negative. Spiked again last night COVID test pending. He was started on HCQ on 3/20 pending the test.  Rocephin was changed to zosyn.  3/23: afebrile. Hemodynamically stable. Requiring minimal support from vent, FIO2 30%. SBT to commence shortly. UOP good, labs reviewed. COVID pending. Sputum culture normal indu so far.    3/24: data reviewed remotely. Discussed with RN. CXR no change. Now off of the vent. Feeding tube placed and then removed. Family refused PEG. No fever, cultures negative from ET aspirate. No adverse events from Azithro/HCQ. covid pending. picc placed.     EXAM & DIAGNOSTICS REVIEWED:   Vitals:     Temp:  [97.7 °F (36.5 °C)-98.4 °F (36.9 °C)]   Temp: 98.4 °F (36.9 °C) (03/24/20 0030)  Pulse: 84 (03/24/20 0710)  Resp: (!) 37 (03/24/20 0710)  BP: 101/61 (03/24/20 0203)  SpO2: (!) 94 % (03/24/20 0710)    Intake/Output Summary (Last 24 hours) at 3/24/2020 1305  Last data filed at 3/23/2020 1700  Gross per 24 hour   Intake 412.79 ml   Output 1700 ml   Net -1287.21 ml     All available parameters were reviewed   Exam 3/23  General:  Uncomfortable with intubation  Eyes:  Anicteric, PERRL,   ENT:  No ulcers, exudates,  thrush, nares patent, dentition is poor  Neck:  supple, no masses or adenopathy appreciated  Lungs: Coarse without consolidation  Heart:  RRR, no gallop/murmur/rub noted  Abd:  Soft, NT, ND, normal BS, no masses or organomegaly appreciated.  :   Gerardo, urine clear, no flank tenderness  Musc:  Joints without effusion, swelling, erythema, synovitis,  With extensive muscle wasting.   Skin:  Mild seborrhea. No palmar or plantar lesions. No subungual petechiae  Wound:   Neuro:  sedated, partially. Uncomfortable, wasted musculature,   Psych:  sedated  Lymphatic:     No cervical, supraclavicular,   nodes  Extrem: No edema, erythema, phlebitis, cellulitis, warm and well perfused, pos clubbing, SCDs in place  VAD:   peripherals    Isolation:  Airborne, contact, droplet    Lines/Tubes/Drains:    General Labs reviewed:  Recent Labs   Lab 03/22/20  0306 03/23/20  0419  03/24/20  0409 03/24/20  0410 03/24/20  0524   WBC 4.69  --  3.19*  --   --  3.53*  --    HGB 10.7*  --  7.7*  --   --  10.9*  --    HCT 32.5*   < > 23.1*   < > 30* 33.2* 32*     --  114*  --   --  184  --     < > = values in this interval not displayed.       Recent Labs   Lab 03/19/20  1930  03/21/20  0347 03/22/20  0306 03/24/20  0410      < > 143 141 142   K 4.1   < > 3.5 3.3* 3.6      < > 112* 108 111*   CO2 24   < > 23 20* 22*   BUN 22   < > 19 20 10   CREATININE 0.7   < > 0.7 0.6 0.5   CALCIUM 9.1   < > 8.6* 8.5* 8.4*   PROT 7.3  --   --   --   --    BILITOT 0.6  --   --   --   --    ALKPHOS 70  --   --   --   --    ALT 16  --   --   --   --    AST 24  --   --   --   --     < > = values in this interval not displayed.     Recent Labs   Lab 03/22/20  0306   CRP 7.64*       flu test :  3/19  Procalcitonin:  3/19 normal  COVID 19: 3/19  G6PD:  pending  QTC baseline:   QTC serial:401, 408  HIV Ab :  Troponin: normal x 2  BNP :   13  Hydroxychloroquine started :  3/20  Darunavir/CbST started  :  azithromycin started: 3/20  Remdesivir   started : not available      Micro:  Microbiology Results (last 7 days)     Procedure Component Value Units Date/Time    Culture, Respiratory with Gram Stain [[id number]] Collected:  03/22/20 1401    Order Status:  Completed Specimen:  Respiratory from Tracheal Aspirate Updated:  03/24/20 0713     Respiratory Culture Reduced normal respiratory indu     Gram Stain (Respiratory) <10 epithelial cells per low power field.     Gram Stain (Respiratory) Few WBC's     Gram Stain (Respiratory) Rare Gram positive cocci    Blood culture x two cultures. Draw prior to antibiotics. [[id number]] Collected:  03/19/20 1930    Order Status:  Completed Specimen:  Blood from Peripheral, Upper Arm, Right Updated:  03/23/20 2232     Blood Culture, Routine No Growth to date      No Growth to date      No Growth to date      No Growth to date      No Growth to date    Narrative:       Aerobic and anaerobic    Blood culture x two cultures. Draw prior to antibiotics. [[id number]] Collected:  03/19/20 2005    Order Status:  Completed Specimen:  Blood from Peripheral, Antecubital, Right Updated:  03/23/20 2232     Blood Culture, Routine No Growth to date      No Growth to date      No Growth to date      No Growth to date      No Growth to date    Narrative:       Aerobic and anaerobic    Culture, Respiratory with Gram Stain [[id number]] Collected:  03/22/20 1401    Order Status:  Canceled Specimen:  Respiratory from Tracheal Aspirate     Strep A culture, throat [[id number]] Collected:  03/19/20 2000    Order Status:  Completed Specimen:  Throat Updated:  03/22/20 0843     Strep A Culture No significant growth      No  Group A  Streptococcus isolated    Urine culture [[id number]] Collected:  03/19/20 2103    Order Status:  Completed Specimen:  Urine, Clean Catch Updated:  03/22/20 0749     Urine Culture, Routine No growth    Narrative:       Indicated criteria for high risk culture:->Other  Other (specify):->possible sepsis    MRSA Screen by PCR  [[id number]] Collected:  03/19/20 2210    Order Status:  Completed Specimen:  Nasopharyngeal Swab from Nasal Updated:  03/20/20 0033     MRSA SCREEN BY PCR Negative    Throat Screen, Rapid [[id number]] Collected:  03/19/20 2000    Order Status:  Completed Specimen:  Throat Updated:  03/19/20 2041     Rapid Strep A Screen Negative     Comment: See Micro for reflexed Strep culture.           Imaging Reviewed:   CXRs    CT chest    Cardiology:    IMPRESSION & PLAN   1. Aspiration pneumonia, x 2?, now with respiratory failure  2. LTC resident, COVID pending, low WBC  3. Debilitated and non verbal from Parkinson's   4.       Recommendations:  awaiting G6PD, continue hCQ and azithromycin     Maintain isolation  Continue zosyn for aspiration pneumonia for one more day   consider DNR  Given that he is not getting a PEG per patient's family and his prior directives.   Poor prognosis due to inability to take po, patient/family wishes to avoid PEG  High risk of demise, and need for IV treatment, high complexity

## 2020-03-24 NOTE — PROGRESS NOTES
[facility]  Pulmonology  Progress Note    Patient Name: Mukesh Addison  MRN: [id number]  Admission Date: 3/19/2020  Hospital Length of Stay: 4 days  Code Status: Full Code  Attending Provider: Elie Mcghee MD  Primary Care Provider: Primary Doctor No   Principal Problem: Acute hypoxemic respiratory failure    Subjective:     Interval History:     3/23/2020 - Pt stable overnight, looks better this A and O2 has been decreased.  Has not had much in the way of secretions.  We will plan to try a SBT and see how he does (hopefully we can extubate).  HGB has decreased but no active bleeding reported.    3/24/2020 - Stable overnight, proceeded with SBT with good parameters and was able to be extubated.  No new issues reported.  Not very awake when I saw him.  Sats 98% on NC O2 and in no distress.    Review of Systems   Unable to perform ROS: Mental acuity         Objective:     Vital Signs (Most Recent):  Temp: 98.4 °F (36.9 °C) (03/24/20 0030)  Pulse: 84 (03/24/20 0710)  Resp: (!) 37 (03/24/20 0710)  BP: 101/61 (03/24/20 0203)  SpO2: (!) 94 % (03/24/20 0710) Vital Signs (24h Range):  Temp:  [97.7 °F (36.5 °C)-98.4 °F (36.9 °C)] 98.4 °F (36.9 °C)  Pulse:  [57-84] 84  Resp:  [19-40] 37  SpO2:  [93 %-100 %] 94 %  BP: (101-124)/(56-67) 101/61  Arterial Line BP: (121-163)/(61-71) 149/67     Weight: 69.1 kg (152 lb 5.4 oz)  Body mass index is 25.35 kg/m².      Intake/Output Summary (Last 24 hours) at 3/24/2020 1402  Last data filed at 3/23/2020 1700  Gross per 24 hour   Intake 412.79 ml   Output 1700 ml   Net -1287.21 ml       Physical Exam   Constitutional: He appears well-developed and well-nourished. No distress.   chronically ill male  Just extubated, no distress   HENT:   Head: Normocephalic and atraumatic.   Right Ear: External ear normal.   Left Ear: External ear normal.   Nose: Nose normal.   Mouth/Throat: Oropharynx is clear and moist.   Eyes: Pupils are equal, round, and reactive to light. EOM are normal.    Neck: Normal range of motion. Neck supple. No JVD present. No tracheal deviation present. No thyromegaly present.   Cardiovascular: Normal rate, regular rhythm, normal heart sounds and intact distal pulses. Exam reveals no gallop and no friction rub.   No murmur heard.  Pulmonary/Chest: Effort normal and breath sounds normal. No stridor. No respiratory distress. He has no wheezes. He has no rales. He exhibits no tenderness.   Abdominal: Soft. Bowel sounds are normal. He exhibits no distension. There is no tenderness. There is no rebound and no guarding.   Genitourinary:   Genitourinary Comments: holbrook   Musculoskeletal: Normal range of motion. He exhibits no edema or tenderness.   Lymphadenopathy:     He has no cervical adenopathy.   Neurological: He has normal reflexes. No cranial nerve deficit.   Still sleepy and cannot fully assess   Skin: He is not diaphoretic.   Psychiatric: He has a normal mood and affect. His behavior is normal.   calm   Nursing note and vitals reviewed.      Vents:  Vent Mode: Spont (03/24/20 0524)  Ventilator Initiated: Yes (03/22/20 0255)  Set Rate: 16 BPM (03/24/20 0430)  Vt Set: 350 mL (03/24/20 0524)  Pressure Support: 10 cmH20 (03/24/20 0524)  PEEP/CPAP: 5 cmH20 (03/24/20 0524)  Oxygen Concentration (%): 30 (03/24/20 0524)  Peak Airway Pressure: 16 cmH2O (03/24/20 0524)  Plateau Pressure: 12 cmH20 (03/24/20 0524)  Total Ve: 16.3 mL (03/24/20 0524)  F/VT Ratio<105 (RSBI): (!) 63.27 (03/24/20 0524)    Lines/Drains/Airways     Peripherally Inserted Central Catheter Line            PICC Triple Lumen 03/24/20 0850 left brachial;left basilic less than 1 day          Drain                 Urethral Catheter 03/19/20 2102 Straight-tip 16 Fr. 4 days         NG/OG Tube 03/23/20 0005 Left mouth 1 day          Airway                 Airway - Non-Surgical 03/22/20 0230 Endotracheal Tube 2 days          Arterial Line                 Arterial Line 03/22/20 0328 Right Radial 2 days           Peripheral Intravenous Line                 Peripheral IV - Single Lumen 03/19/20 1830 20 G Left Forearm 4 days         Peripheral IV - Single Lumen 03/19/20 1925 18 G Right Upper Arm 4 days                Significant Labs:    CBC/Anemia Profile:  Recent Labs   Lab 03/23/20  0419  03/24/20  0409 03/24/20  0410 03/24/20  0524   WBC 3.19*  --   --  3.53*  --    HGB 7.7*  --   --  10.9*  --    HCT 23.1*   < > 30* 33.2* 32*   *  --   --  184  --    MCV 97  --   --  97  --    RDW 12.4  --   --  12.5  --     < > = values in this interval not displayed.        Chemistries:  Recent Labs   Lab 03/24/20 0410      K 3.6   *   CO2 22*   BUN 10   CREATININE 0.5   CALCIUM 8.4*   MG 2.4   PHOS 3.5       All pertinent labs within the past 24 hours have been reviewed.    Recent Labs     03/24/20  0524   PH 7.432   PCO2 35.0   PO2 60*   HCO3 23.4*   POCSATURATED 92*   BE -1     Microbiology Results (last 7 days)     Procedure Component Value Units Date/Time    Culture, Respiratory with Gram Stain [[id number]] Collected:  03/22/20 1401    Order Status:  Completed Specimen:  Respiratory from Tracheal Aspirate Updated:  03/24/20 0713     Respiratory Culture Reduced normal respiratory indu     Gram Stain (Respiratory) <10 epithelial cells per low power field.     Gram Stain (Respiratory) Few WBC's     Gram Stain (Respiratory) Rare Gram positive cocci    Blood culture x two cultures. Draw prior to antibiotics. [[id number]] Collected:  03/19/20 1930    Order Status:  Completed Specimen:  Blood from Peripheral, Upper Arm, Right Updated:  03/23/20 2232     Blood Culture, Routine No Growth to date      No Growth to date      No Growth to date      No Growth to date      No Growth to date    Narrative:       Aerobic and anaerobic    Blood culture x two cultures. Draw prior to antibiotics. [[id number]] Collected:  03/19/20 2005    Order Status:  Completed Specimen:  Blood from Peripheral, Antecubital, Right Updated:  03/23/20  2232     Blood Culture, Routine No Growth to date      No Growth to date      No Growth to date      No Growth to date      No Growth to date    Narrative:       Aerobic and anaerobic    Culture, Respiratory with Gram Stain [[id number]] Collected:  03/22/20 1401    Order Status:  Canceled Specimen:  Respiratory from Tracheal Aspirate     Strep A culture, throat [[id number]] Collected:  03/19/20 2000    Order Status:  Completed Specimen:  Throat Updated:  03/22/20 0843     Strep A Culture No significant growth      No  Group A  Streptococcus isolated    Urine culture [[id number]] Collected:  03/19/20 2103    Order Status:  Completed Specimen:  Urine, Clean Catch Updated:  03/22/20 0749     Urine Culture, Routine No growth    Narrative:       Indicated criteria for high risk culture:->Other  Other (specify):->possible sepsis    MRSA Screen by PCR [[id number]] Collected:  03/19/20 2210    Order Status:  Completed Specimen:  Nasopharyngeal Swab from Nasal Updated:  03/20/20 0033     MRSA SCREEN BY PCR Negative    Throat Screen, Rapid [[id number]] Collected:  03/19/20 2000    Order Status:  Completed Specimen:  Throat Updated:  03/19/20 2041     Rapid Strep A Screen Negative     Comment: See Micro for reflexed Strep culture.               Significant Imaging:  I have reviewed and interpreted all pertinent imaging results/findings within the past 24 hours.     CXR (3/23) - bilateral infiltrates +/- better  CXR (3/24) - + infiltrates NG malpositioned (addressed)    EKG  Vent. Rate : 076 BPM     Atrial Rate : 076 BPM     P-R Int : 116 ms          QRS Dur : 078 ms      QT Int : 376 ms       P-R-T Axes : 050 028 036 degrees     QTc Int : 423 ms    Normal sinus rhythm  Normal ECG  When compared with ECG of 19-MAR-2020 19:48,  No significant change was found  Confirmed by Jesus DOMINGUEZ, Caio MCLEAN ([id number]) on 3/23/2020 11:33:03 AM    Assessment/Plan:     * Acute hypoxemic respiratory failure  · Now extubated and stable    Aspiration  pneumonia  · By history  · Continue antibiotics    Suspected Covid-19 Virus Infection  · Mami says they have NOT had any + Covid pt  · Test pending  · Take precautions    Anemia  · H/H has decreased but no reported blood loss  ·  follow and transfuse for hgb < 7    Hypokalemia  · Replace     CVA (cerebral vascular accident)  · Aware     Pharyngeal dysphagia  · Place NG for feeding  · Once better will need swallow evaluation and I suspect he will probably need PEG    Pneumonia of both lungs due to infectious organism  · As above, continue treatments    HTN (hypertension)  · Aware, follow     Critical Care Time    I have spent > 35 minutes providing critical care services for this pt for the above diagnoses.  These services have included pt evaluation, pt exam, ventilator assessment, SBT assessment, decision to extubate, discussions with staff, chart review, data review, note preparation and .  The patient has life threatening illness with a high risk of decompensation and/or death.         You Taylor MD  Pulmonology  [facility]

## 2020-03-24 NOTE — PLAN OF CARE
03/23/20 1959   Patient Assessment/Suction   Level of Consciousness (AVPU) unresponsive   Respiratory Effort Normal;Unlabored   Expansion/Accessory Muscles/Retractions expansion symmetric;no retractions;no use of accessory muscles   All Lung Fields Breath Sounds diminished;equal bilaterally   Rhythm/Pattern, Respiratory assisted mechanically   PRE-TX-O2   O2 Device (Oxygen Therapy) ventilator   Oxygen Concentration (%) 30   SpO2 98 %   Pulse Oximetry Type Continuous   $ Pulse Oximetry - Multiple Charge Pulse Oximetry - Multiple   Pulse (!) 58   Resp (!) 21   Aerosol Therapy   $ Aerosol Therapy Charges PRN treatment not required        Airway - Non-Surgical 03/22/20 0230 Endotracheal Tube   Placement Date/Time: 03/22/20 0230   Present Prior to Hospital Arrival?: No  Method of Intubation: Direct laryngoscopy  Inserted by: MD  Staff/Resident Name(s): DR WITT  Airway Device: Endotracheal Tube  Mask Ventilation: Easy  Airway Device Size: ...   Secured at 24 cm   Measured At Lips   Secured Location Left   Secured by Commercial tube byrnes   Bite Block none   Status Intact;Secured;Patent   Cuff Pressure   (mlt)   Vent Select   Conventional Vent Y   Charged w/in last 24h YES   Preset Conventional Ventilator Settings   Vent ID [id number]   Vent Type    Ventilation Type VC   Vent Mode Spont   Vt Set 350 mL   PEEP/CPAP 5 cmH20   Pressure Support 10 cmH20   Waveform RAMP   Peak Flow 60 L/min   Plateau Set/Insp. Hold (sec) 0   Insp Rise Time  50 %   Trigger Sensitivity Flow/I-Trigger 3 L/min   Patient Ventilator Parameters   Resp Rate Total 20 br/min   Peak Airway Pressure 16 cmH2O   Mean Airway Pressure 9 cmH20   Plateau Pressure 12 cmH20   Exhaled Vt 431 mL   Total Ve 8.85 mL   Spont Ve 8.85 L   I:E Ratio Measured 1:2.10   Conventional Ventilator Alarms   Alarms On Y   Resp Rate High Alarm 50 br/min   Press High Alarm 50 cmH2O   Apnea Rate 10   Apnea Volume (mL) 1 mL   Apnea Oxygen Concentration  100   Apnea Flow Rate  (L/min) 60   T Apnea 20 sec(s)   Ready to Wean/Extubation Screen   FIO2<=50 (chart decimal) 0.3   MV<16L (chart vol.) 8.85   PEEP <=8 (chart #) 5   Ready to Wean Parameters   F/VT Ratio<105 (RSBI) (!) 48.72   Respiratory Evaluation   $ Care Plan Tech Time 15 min   Evaluation For   (careplan)

## 2020-03-24 NOTE — CONSULTS
"[facility]  Adult Nutrition   Consult Note (Nutrition Support Management)    SUMMARY     Recommendations  Recommendation/Intervention:   1) TPN ordered to be started today at 1700.   2) Will continue to monitor electrolytes daily and adjust parenteral nutrition as warranted.     Goals: Previous goals discontinued. New- 1) Patient will tolerate TPN. 2) Patient will transition from TPN to enteral or oral nutrition  Nutrition Goal Status: new  Communication of JUAN Recs: discussed on rounds     Plan:  Start TPN at 83grams Protein, 133grams Dextrose and 50gm Lipids.  Dextrose is started at less than full dextrose goal to reduce risk for Refeeding Syndrome.  Dextrose content will be advanced as tolerated over the next few days.     Today's TPN provides 1284 kcal.     Dietitian Rounds Brief  Consult received for TPN. Patient NGT fell off during extubation and unable to be replaced. Patient does not follow commands and hx aspiration PNA. Possible PEG placement.     Reason for Assessment  Reason For Assessment: new TPN, consult    Nutrition Risk Screen  Nutrition Risk Screen: dysphagia or difficulty swallowing     MST Score: 2  Have you recently lost weight without trying?: Unsure  Weight loss score: 2  Have you been eating poorly because of a decreased appetite?: No(Unable to assess)  Appetite score: 0       Nutrition/Diet History  Spiritual, Cultural Beliefs, Scientology Practices, Values that Affect Care: other (see comments)(pt non verbal)  Food Allergies: NKFA  Factors Affecting Nutritional Intake: NPO, impaired cognitive status/motor control, difficulty/impaired swallowing    Anthropometrics  Temp: 98.4 °F (36.9 °C)  Height Method: Estimated  Height: 5' 5" (165.1 cm)  Height (inches): 65 in  Weight Method: Bed Scale  Weight: 69.1 kg (152 lb 5.4 oz)  Weight (lb): 152.34 lb  Ideal Body Weight (IBW), Male: 136 lb  % Ideal Body Weight, Male (lb): 109.1 %  BMI (Calculated): 25.4  BMI Grade: 25 - 29.9 - " overweight       Weight History:  Wt Readings from Last 10 Encounters:   03/20/20 69.1 kg (152 lb 5.4 oz)   03/20/20 69.1 kg (152 lb 5.4 oz)   12/12/19 68 kg (150 lb)   09/13/16 81.6 kg (180 lb)       Lab/Procedures/Meds: Pertinent Labs Reviewed  Clinical Chemistry:  Recent Labs   Lab 03/19/20 1930 03/21/20  0347 03/22/20  0306 03/24/20  0410      < > 143 141 142   K 4.1   < > 3.5 3.3* 3.6      < > 112* 108 111*   CO2 24   < > 23 20* 22*      < > 115* 103 91   BUN 22   < > 19 20 10   CREATININE 0.7   < > 0.7 0.6 0.5   CALCIUM 9.1   < > 8.6* 8.5* 8.4*   PROT 7.3  --   --   --   --    ALBUMIN 3.9  --   --   --   --    BILITOT 0.6  --   --   --   --    ALKPHOS 70  --   --   --   --    AST 24  --   --   --   --    ALT 16  --   --   --   --    ANIONGAP 11   < > 8 13 9   ESTGFRAFRICA >60.0   < > >60.0 >60.0 >60.0   EGFRNONAA >60.0   < > >60.0 >60.0 >60.0   MG 2.1   < > 2.2 2.1 2.4   PHOS 3.3   < > 3.2 3.6 3.5   LIPASE 35  --   --   --   --     < > = values in this interval not displayed.     CBC:   Recent Labs   Lab 03/24/20 0410 03/24/20  0524   WBC 3.53*  --    RBC 3.43*  --    HGB 10.9*  --    HCT 33.2* 32*     --    MCV 97  --    MCH 31.8*  --    MCHC 32.8  --      Cardiac Profile:  Recent Labs   Lab 03/19/20 1930 03/22/20  0306   BNP 13  --    TROPONINI <0.030 <0.030     Inflammatory Labs:  Recent Labs   Lab 03/22/20  0306   CRP 7.64*     Medications: Pertinent Medications reviewed  Scheduled Meds:   aspirin  81 mg Per G Tube Daily    azithromycin  500 mg Intravenous Q24H    carbidopa-levodopa  mg  1 tablet Oral TID    chlorhexidine  15 mL Mouth/Throat BID    enoxparin  40 mg Subcutaneous Q24H    famotidine (PF)  20 mg Intravenous BID    hydroxychloroquine  200 mg Oral BID    lisinopriL  10 mg Oral QHS    mupirocin   Nasal BID    piperacillin-tazobactam (ZOSYN) IVPB  3.375 g Intravenous Q8H     Continuous Infusions:   sodium chloride 0.9% 75 mL/hr at 03/22/20 5234     propofoL 20 mcg/kg/min (03/24/20 0402)    TPN ADULT CENTRAL LINE CUSTOM (3 in 1)       Estimated/Assessed Needs  Weight Used For Calorie Calculations: 69.1 kg (152 lb 5.4 oz)  Energy Calorie Requirements (kcal): 9515-4640 kcals/day (25-30 kcals/kg)  Energy Need Method: Kcal/kg  Protein Requirements: 69-90 g/day (1.0-1.3 g/kg)  Weight Used For Protein Calculations: 69.1 kg (152 lb 5.4 oz)     Estimated Fluid Requirement Method: RDA Method  RDA Method (mL): 1727     Nutrition Prescription Ordered  Current Diet Order: NPO    Evaluation of Received Nutrient/Fluid Intake  Other Calories (kcal): 0  Energy Calories Required: meeting needs  Protein Required: meeting needs  Fluid Required: meeting needs  Comments: Consult received for TPN. Patient NGT fell off during extubation and unable to be replaced. Patient does not follow commands and hx aspiration PNA. Possible PEG placement.   Tolerance: (NPO)     Intake/Output Summary (Last 24 hours) at 3/24/2020 1441  Last data filed at 3/23/2020 1700  Gross per 24 hour   Intake 412.79 ml   Output 1700 ml   Net -1287.21 ml      % Intake of Estimated Energy Needs: 0 - 25 %  % Meal Intake: NPO    Nutrition Risk  Level of Risk/Frequency of Follow-up: high     Monitor and Evaluation  Food and Nutrient Intake: energy intake, food and beverage intake  Food and Nutrient Adminstration: diet order  Physical Activity and Function: nutrition-related ADLs and IADLs, factors affecting access to physical activity  Anthropometric Measurements: weight, weight change, body mass index  Biochemical Data, Medical Tests and Procedures: electrolyte and renal panel, glucose/endocrine profile, lipid profile, gastrointestinal profile, inflammatory profile  Nutrition-Focused Physical Findings: overall appearance     Nutrition Follow-Up  RD Follow-up?: Yes

## 2020-03-24 NOTE — PLAN OF CARE
03/24/20 0710   Patient Assessment/Suction   Level of Consciousness (AVPU) responds to voice   Respiratory Effort Unlabored;Normal   Expansion/Accessory Muscles/Retractions no use of accessory muscles;no retractions;expansion symmetric   All Lung Fields Breath Sounds clear   Rhythm/Pattern, Respiratory pattern regular;depth regular   Cough Frequency infrequent   Cough Type fair   Suction Method tracheal;oral   $ Suction Charges Inline Suction Procedure Stat Charge   Secretions Amount scant   Secretions Color white   Secretions Characteristics thin   PRE-TX-O2   O2 Device (Oxygen Therapy) nasal cannula   $ Is the patient on Low Flow Oxygen? Yes   Flow (L/min) 3   SpO2 (!) 94 %   Pulse Oximetry Type Continuous   $ Pulse Oximetry - Multiple Charge Pulse Oximetry - Multiple   Pulse 84   Resp (!) 37   Aerosol Therapy   $ Aerosol Therapy Charges PRN treatment not required   Ready to Wean Parameters   $ Extubation Tech Time Tech Time 30 min   SBT Safety Screen Pass   Doctor Notified and Provider Name dr piña   Extubated? Yes   Ventilator Discontinued Yes

## 2020-03-24 NOTE — ASSESSMENT & PLAN NOTE
· Place NG for feeding  · Once better will need swallow evaluation and I suspect he will probably need PEG

## 2020-03-25 PROBLEM — E83.39 HYPOPHOSPHATEMIA: Status: ACTIVE | Noted: 2020-03-25

## 2020-03-25 LAB
ALBUMIN SERPL BCP-MCNC: 2.8 G/DL (ref 3.5–5.2)
ALP SERPL-CCNC: 40 U/L (ref 55–135)
ALT SERPL W/O P-5'-P-CCNC: 17 U/L (ref 10–44)
ANION GAP SERPL CALC-SCNC: 8 MMOL/L (ref 8–16)
AST SERPL-CCNC: 23 U/L (ref 10–40)
BASOPHILS # BLD AUTO: 0.01 K/UL (ref 0–0.2)
BASOPHILS NFR BLD: 0.2 % (ref 0–1.9)
BILIRUB SERPL-MCNC: 0.7 MG/DL (ref 0.1–1)
BUN SERPL-MCNC: 13 MG/DL (ref 8–23)
CALCIUM SERPL-MCNC: 8 MG/DL (ref 8.7–10.5)
CHLORIDE SERPL-SCNC: 107 MMOL/L (ref 95–110)
CO2 SERPL-SCNC: 22 MMOL/L (ref 23–29)
CREAT SERPL-MCNC: 0.5 MG/DL (ref 0.5–1.4)
DIFFERENTIAL METHOD: ABNORMAL
EOSINOPHIL # BLD AUTO: 0 K/UL (ref 0–0.5)
EOSINOPHIL NFR BLD: 0.8 % (ref 0–8)
ERYTHROCYTE [DISTWIDTH] IN BLOOD BY AUTOMATED COUNT: 12 % (ref 11.5–14.5)
EST. GFR  (AFRICAN AMERICAN): >60 ML/MIN/1.73 M^2
EST. GFR  (NON AFRICAN AMERICAN): >60 ML/MIN/1.73 M^2
G6PD BLD QN: 240 U/10E12 RBC (ref 146–376)
GLUCOSE SERPL-MCNC: 111 MG/DL (ref 70–110)
GLUCOSE SERPL-MCNC: 132 MG/DL (ref 70–110)
GLUCOSE SERPL-MCNC: 147 MG/DL (ref 70–110)
GLUCOSE SERPL-MCNC: 153 MG/DL (ref 70–110)
GLUCOSE SERPL-MCNC: 157 MG/DL (ref 70–110)
HCT VFR BLD AUTO: 31.3 % (ref 40–54)
HGB BLD-MCNC: 10.7 G/DL (ref 14–18)
IMM GRANULOCYTES # BLD AUTO: 0.02 K/UL (ref 0–0.04)
IMM GRANULOCYTES NFR BLD AUTO: 0.4 % (ref 0–0.5)
LYMPHOCYTES # BLD AUTO: 0.9 K/UL (ref 1–4.8)
LYMPHOCYTES NFR BLD: 18.2 % (ref 18–48)
MAGNESIUM SERPL-MCNC: 2.1 MG/DL (ref 1.6–2.6)
MCH RBC QN AUTO: 31.8 PG (ref 27–31)
MCHC RBC AUTO-ENTMCNC: 34.2 G/DL (ref 32–36)
MCV RBC AUTO: 93 FL (ref 82–98)
MONOCYTES # BLD AUTO: 0.4 K/UL (ref 0.3–1)
MONOCYTES NFR BLD: 8.6 % (ref 4–15)
NEUTROPHILS # BLD AUTO: 3.5 K/UL (ref 1.8–7.7)
NEUTROPHILS NFR BLD: 71.8 % (ref 38–73)
NRBC BLD-RTO: 0 /100 WBC
PHOSPHATE SERPL-MCNC: 2 MG/DL (ref 2.7–4.5)
PLATELET # BLD AUTO: 207 K/UL (ref 150–350)
PMV BLD AUTO: 10.4 FL (ref 9.2–12.9)
POTASSIUM SERPL-SCNC: 3.2 MMOL/L (ref 3.5–5.1)
PREALB SERPL-MCNC: 8 MG/DL (ref 20–43)
PREALB SERPL-MCNC: 8 MG/DL (ref 20–43)
PROT SERPL-MCNC: 6.4 G/DL (ref 6–8.4)
RBC # BLD AUTO: 3.17 X10E6/UL (ref 4.14–5.8)
RBC # BLD AUTO: 3.36 M/UL (ref 4.6–6.2)
SODIUM SERPL-SCNC: 137 MMOL/L (ref 136–145)
TRIGL SERPL-MCNC: 130 MG/DL (ref 30–150)
TRIGL SERPL-MCNC: 130 MG/DL (ref 30–150)
WBC # BLD AUTO: 4.9 K/UL (ref 3.9–12.7)

## 2020-03-25 PROCEDURE — B4185 PARENTERAL SOL 10 GM LIPIDS: HCPCS | Performed by: INTERNAL MEDICINE

## 2020-03-25 PROCEDURE — A4217 STERILE WATER/SALINE, 500 ML: HCPCS | Performed by: INTERNAL MEDICINE

## 2020-03-25 PROCEDURE — 63600175 PHARM REV CODE 636 W HCPCS: Performed by: INTERNAL MEDICINE

## 2020-03-25 PROCEDURE — 83735 ASSAY OF MAGNESIUM: CPT

## 2020-03-25 PROCEDURE — 94761 N-INVAS EAR/PLS OXIMETRY MLT: CPT

## 2020-03-25 PROCEDURE — S0028 INJECTION, FAMOTIDINE, 20 MG: HCPCS | Performed by: INTERNAL MEDICINE

## 2020-03-25 PROCEDURE — 85025 COMPLETE CBC W/AUTO DIFF WBC: CPT

## 2020-03-25 PROCEDURE — 84478 ASSAY OF TRIGLYCERIDES: CPT

## 2020-03-25 PROCEDURE — 99900035 HC TECH TIME PER 15 MIN (STAT)

## 2020-03-25 PROCEDURE — 25000003 PHARM REV CODE 250: Performed by: INTERNAL MEDICINE

## 2020-03-25 PROCEDURE — 99233 SBSQ HOSP IP/OBS HIGH 50: CPT | Mod: ,,, | Performed by: INTERNAL MEDICINE

## 2020-03-25 PROCEDURE — 84134 ASSAY OF PREALBUMIN: CPT

## 2020-03-25 PROCEDURE — 21400001 HC TELEMETRY ROOM

## 2020-03-25 PROCEDURE — 80053 COMPREHEN METABOLIC PANEL: CPT

## 2020-03-25 PROCEDURE — 84100 ASSAY OF PHOSPHORUS: CPT

## 2020-03-25 PROCEDURE — 27000221 HC OXYGEN, UP TO 24 HOURS

## 2020-03-25 PROCEDURE — 99233 PR SUBSEQUENT HOSPITAL CARE,LEVL III: ICD-10-PCS | Mod: ,,, | Performed by: INTERNAL MEDICINE

## 2020-03-25 RX ORDER — POTASSIUM CHLORIDE 7.45 MG/ML
20 INJECTION INTRAVENOUS
Status: DISCONTINUED | OUTPATIENT
Start: 2020-03-25 | End: 2020-03-29

## 2020-03-25 RX ORDER — POTASSIUM CHLORIDE 7.45 MG/ML
40 INJECTION INTRAVENOUS
Status: DISCONTINUED | OUTPATIENT
Start: 2020-03-25 | End: 2020-03-29

## 2020-03-25 RX ORDER — MAGNESIUM SULFATE 1 G/100ML
1 INJECTION INTRAVENOUS
Status: DISCONTINUED | OUTPATIENT
Start: 2020-03-25 | End: 2020-03-29

## 2020-03-25 RX ORDER — MAGNESIUM SULFATE HEPTAHYDRATE 40 MG/ML
2 INJECTION, SOLUTION INTRAVENOUS
Status: DISCONTINUED | OUTPATIENT
Start: 2020-03-25 | End: 2020-03-29

## 2020-03-25 RX ORDER — CALCIUM CHLORIDE IN 0.9 % NACL 1 G/100 ML
1 INTRAVENOUS SOLUTION, PIGGYBACK (ML) INTRAVENOUS
Status: DISCONTINUED | OUTPATIENT
Start: 2020-03-25 | End: 2020-03-29

## 2020-03-25 RX ORDER — MAGNESIUM SULFATE HEPTAHYDRATE 40 MG/ML
4 INJECTION, SOLUTION INTRAVENOUS
Status: DISCONTINUED | OUTPATIENT
Start: 2020-03-25 | End: 2020-03-29

## 2020-03-25 RX ADMIN — PIPERACILLIN AND TAZOBACTAM 3.38 G: 3; .375 INJECTION, POWDER, FOR SOLUTION INTRAVENOUS at 01:03

## 2020-03-25 RX ADMIN — MUPIROCIN: 20 OINTMENT TOPICAL at 08:03

## 2020-03-25 RX ADMIN — POTASSIUM CHLORIDE 40 MEQ: 10 INJECTION, SOLUTION INTRAVENOUS at 10:03

## 2020-03-25 RX ADMIN — FAMOTIDINE 20 MG: 10 INJECTION INTRAVENOUS at 09:03

## 2020-03-25 RX ADMIN — SODIUM PHOSPHATE, MONOBASIC, MONOHYDRATE AND SODIUM PHOSPHATE, DIBASIC, ANHYDROUS 20.01 MMOL: 276; 142 INJECTION, SOLUTION INTRAVENOUS at 01:03

## 2020-03-25 RX ADMIN — CHLORHEXIDINE GLUCONATE 15 ML: 1.2 RINSE ORAL at 08:03

## 2020-03-25 RX ADMIN — PIPERACILLIN AND TAZOBACTAM 3.38 G: 3; .375 INJECTION, POWDER, FOR SOLUTION INTRAVENOUS at 09:03

## 2020-03-25 RX ADMIN — MAGNESIUM SULFATE HEPTAHYDRATE: 500 INJECTION, SOLUTION INTRAMUSCULAR; INTRAVENOUS at 09:03

## 2020-03-25 RX ADMIN — ENOXAPARIN SODIUM 40 MG: 100 INJECTION SUBCUTANEOUS at 01:03

## 2020-03-25 RX ADMIN — MUPIROCIN: 20 OINTMENT TOPICAL at 09:03

## 2020-03-25 RX ADMIN — PIPERACILLIN AND TAZOBACTAM 3.38 G: 3; .375 INJECTION, POWDER, FOR SOLUTION INTRAVENOUS at 05:03

## 2020-03-25 RX ADMIN — FAMOTIDINE 20 MG: 10 INJECTION INTRAVENOUS at 08:03

## 2020-03-25 NOTE — SUBJECTIVE & OBJECTIVE
Interval History:     3/23/2020 - Pt stable overnight, looks better this A and O2 has been decreased.  Has not had much in the way of secretions.  We will plan to try a SBT and see how he does (hopefully we can extubate).  HGB has decreased but no active bleeding reported.    3/24/2020 - Stable overnight, proceeded with SBT with good parameters and was able to be extubated.  No new issues reported.  Not very awake when I saw him.  Sats 98% on NC O2 and in no distress.    3/25/2020 - Stable extubated, a little tachypneic but no distress and to move to floor.  I am told that family does not want a PEG but pt clearly aspirates.  He has been started on TPN.  He is nonverbal.    Review of Systems   Unable to perform ROS: Mental acuity         Objective:     Vital Signs (Most Recent):  Temp: 98.6 °F (37 °C) (03/25/20 1241)  Pulse: 70 (03/25/20 1241)  Resp: (!) 30 (03/25/20 1241)  BP: 126/69 (03/25/20 1241)  SpO2: (!) 91 % (03/25/20 1241) Vital Signs (24h Range):  Temp:  [97.7 °F (36.5 °C)-99 °F (37.2 °C)] 98.6 °F (37 °C)  Pulse:  [63-78] 70  Resp:  [18-50] 30  SpO2:  [91 %-100 %] 91 %  BP: (116-145)/(56-73) 126/69  Arterial Line BP: (140-180)/(43-69) 156/53     Weight: 69.1 kg (152 lb 5.4 oz)  Body mass index is 25.35 kg/m².      Intake/Output Summary (Last 24 hours) at 3/25/2020 1339  Last data filed at 3/25/2020 0600  Gross per 24 hour   Intake 4788.22 ml   Output 1625 ml   Net 3163.22 ml       Physical Exam   Constitutional: He appears well-developed and well-nourished. No distress.   chronically ill male  Just extubated, no distress   HENT:   Head: Normocephalic and atraumatic.   Right Ear: External ear normal.   Left Ear: External ear normal.   Nose: Nose normal.   Mouth/Throat: Oropharynx is clear and moist.   Eyes: Pupils are equal, round, and reactive to light. EOM are normal.   Neck: Normal range of motion. Neck supple. No JVD present. No tracheal deviation present. No thyromegaly present.   Cardiovascular:  Normal rate, regular rhythm, normal heart sounds and intact distal pulses. Exam reveals no gallop and no friction rub.   No murmur heard.  Pulmonary/Chest: Effort normal and breath sounds normal. No stridor. No respiratory distress. He has no wheezes. He has no rales. He exhibits no tenderness.   Abdominal: Soft. Bowel sounds are normal. He exhibits no distension. There is no tenderness. There is no rebound and no guarding.   Genitourinary:   Genitourinary Comments: holbrook   Musculoskeletal: Normal range of motion. He exhibits no edema or tenderness.   Lymphadenopathy:     He has no cervical adenopathy.   Neurological: He has normal reflexes. No cranial nerve deficit.   Still sleepy and cannot fully assess   Skin: He is not diaphoretic.   Psychiatric: He has a normal mood and affect. His behavior is normal.   calm   Nursing note and vitals reviewed.      Vents:  Vent Mode: Spont (03/24/20 0524)  Ventilator Initiated: Yes (03/22/20 0255)  Set Rate: 16 BPM (03/24/20 0430)  Vt Set: 350 mL (03/24/20 0524)  Pressure Support: 10 cmH20 (03/24/20 0524)  PEEP/CPAP: 5 cmH20 (03/24/20 0524)  Oxygen Concentration (%): 30 (03/24/20 0701)  Peak Airway Pressure: 16 cmH2O (03/24/20 0524)  Plateau Pressure: 12 cmH20 (03/24/20 0524)  Total Ve: 16.3 mL (03/24/20 0524)  F/VT Ratio<105 (RSBI): (!) 63.27 (03/24/20 0524)    Lines/Drains/Airways     Peripherally Inserted Central Catheter Line            PICC Triple Lumen 03/24/20 0850 left brachial;left basilic 1 day          Drain                 Urethral Catheter 03/19/20 2102 Straight-tip 16 Fr. 5 days                Significant Labs:    CBC/Anemia Profile:  Recent Labs   Lab 03/24/20 0410 03/24/20 0524 03/25/20  0430   WBC 3.53*  --  4.90   HGB 10.9*  --  10.7*   HCT 33.2* 32* 31.3*     --  207   MCV 97  --  93   RDW 12.5  --  12.0        Chemistries:  Recent Labs   Lab 03/24/20  0410 03/25/20  0430    137   K 3.6 3.2*   * 107   CO2 22* 22*   BUN 10 13   CREATININE  0.5 0.5   CALCIUM 8.4* 8.0*   ALBUMIN  --  2.8*   PROT  --  6.4   BILITOT  --  0.7   ALKPHOS  --  40*   ALT  --  17   AST  --  23   MG 2.4 2.1   PHOS 3.5 2.0*       All pertinent labs within the past 24 hours have been reviewed.    Recent Labs     03/24/20  0524   PH 7.432   PCO2 35.0   PO2 60*   HCO3 23.4*   POCSATURATED 92*   BE -1     Microbiology Results (last 7 days)     Procedure Component Value Units Date/Time    Blood culture x two cultures. Draw prior to antibiotics. [[id number]] Collected:  03/19/20 2005    Order Status:  Completed Specimen:  Blood from Peripheral, Antecubital, Right Updated:  03/24/20 2232     Blood Culture, Routine No growth after 5 days.    Narrative:       Aerobic and anaerobic    Blood culture x two cultures. Draw prior to antibiotics. [[id number]] Collected:  03/19/20 1930    Order Status:  Completed Specimen:  Blood from Peripheral, Upper Arm, Right Updated:  03/24/20 2232     Blood Culture, Routine No growth after 5 days.    Narrative:       Aerobic and anaerobic    Culture, Respiratory with Gram Stain [[id number]] Collected:  03/22/20 1401    Order Status:  Completed Specimen:  Respiratory from Tracheal Aspirate Updated:  03/24/20 0713     Respiratory Culture Reduced normal respiratory indu     Gram Stain (Respiratory) <10 epithelial cells per low power field.     Gram Stain (Respiratory) Few WBC's     Gram Stain (Respiratory) Rare Gram positive cocci    Culture, Respiratory with Gram Stain [[id number]] Collected:  03/22/20 1401    Order Status:  Canceled Specimen:  Respiratory from Tracheal Aspirate     Strep A culture, throat [[id number]] Collected:  03/19/20 2000    Order Status:  Completed Specimen:  Throat Updated:  03/22/20 0843     Strep A Culture No significant growth      No  Group A  Streptococcus isolated    Urine culture [[id number]] Collected:  03/19/20 2103    Order Status:  Completed Specimen:  Urine, Clean Catch Updated:  03/22/20 0749     Urine Culture, Routine No  growth    Narrative:       Indicated criteria for high risk culture:->Other  Other (specify):->possible sepsis    MRSA Screen by PCR [[id number]] Collected:  03/19/20 2210    Order Status:  Completed Specimen:  Nasopharyngeal Swab from Nasal Updated:  03/20/20 0033     MRSA SCREEN BY PCR Negative    Throat Screen, Rapid [[id number]] Collected:  03/19/20 2000    Order Status:  Completed Specimen:  Throat Updated:  03/19/20 2041     Rapid Strep A Screen Negative     Comment: See Micro for reflexed Strep culture.               Significant Imaging:  I have reviewed and interpreted all pertinent imaging results/findings within the past 24 hours.     CXR (3/23) - bilateral infiltrates +/- better  CXR (3/24) - + infiltrates NG malpositioned (addressed)    EKG  Vent. Rate : 076 BPM     Atrial Rate : 076 BPM     P-R Int : 116 ms          QRS Dur : 078 ms      QT Int : 376 ms       P-R-T Axes : 050 028 036 degrees     QTc Int : 423 ms    Normal sinus rhythm  Normal ECG  When compared with ECG of 19-MAR-2020 19:48,  No significant change was found  Confirmed by Jesus DOMINGUEZ, Caio MCLEAN ([id number]) on 3/23/2020 11:33:03 AM

## 2020-03-25 NOTE — PROGRESS NOTES
Critical access hospital Medicine  Progress Note    Patient Name: Mukesh Addison  MRN: [id number]  Patient Class: IP- Inpatient   Admission Date: 3/19/2020  Length of Stay: 5 days  Attending Physician: Elie Mcghee MD  Primary Care Provider: Primary Doctor No        Subjective:     Principal Problem:Acute hypoxemic respiratory failure      Interval History:  No acute overnight events reported. Family do not want PEG tube at this time. Getting TPN currently. Saturating comfortably on 2 L of oxygen via nasal cannula.  He is nonverbal at baseline. Appears to be in no distress.    Review of Systems   Unable to perform ROS: Patient nonverbal     Objective:     Vital Signs (Most Recent):  Temp: 98.6 °F (37 °C) (03/25/20 1241)  Pulse: 70 (03/25/20 1241)  Resp: (!) 30 (03/25/20 1241)  BP: 126/69 (03/25/20 1241)  SpO2: (!) 91 % (03/25/20 1241) Vital Signs (24h Range):  Temp:  [97.7 °F (36.5 °C)-99 °F (37.2 °C)] 98.6 °F (37 °C)  Pulse:  [63-82] 70  Resp:  [18-50] 30  SpO2:  [91 %-100 %] 91 %  BP: (116-145)/(56-73) 126/69  Arterial Line BP: (139-180)/(42-69) 156/53     Weight: 69.1 kg (152 lb 5.4 oz)  Body mass index is 25.35 kg/m².    Intake/Output Summary (Last 24 hours) at 3/25/2020 1243  Last data filed at 3/25/2020 0600  Gross per 24 hour   Intake 4788.22 ml   Output 1625 ml   Net 3163.22 ml      Physical Exam   Constitutional:   Frail  male in no acute distress   HENT:   Head: Normocephalic and atraumatic.   Eyes: Conjunctivae are normal. No scleral icterus.   Neck: Neck supple. No thyromegaly present.   Cardiovascular: Normal rate, regular rhythm, normal heart sounds and intact distal pulses.   Pulmonary/Chest:   Scattered coarse rhonchi (R>L)   Abdominal: Soft. Bowel sounds are normal. He exhibits no distension.   Musculoskeletal: He exhibits deformity. He exhibits no edema.   Mild contractures to upper and lower extremities   Neurological: He is alert. He displays no atrophy.   Non-focal    Skin:  Skin is warm and dry. Capillary refill takes less than 2 seconds. He is not diaphoretic.   Psychiatric: He has a normal mood and affect. His behavior is normal.   Nursing note and vitals reviewed.      Significant Labs:   CBC:   Recent Labs   Lab 03/24/20 0410 03/24/20  0524 03/25/20  0430   WBC 3.53*  --  4.90   HGB 10.9*  --  10.7*   HCT 33.2* 32* 31.3*     --  207     CMP:   Recent Labs   Lab 03/24/20 0410 03/25/20  0430    137   K 3.6 3.2*   * 107   CO2 22* 22*   GLU 91 132*   BUN 10 13   CREATININE 0.5 0.5   CALCIUM 8.4* 8.0*   PROT  --  6.4   ALBUMIN  --  2.8*   BILITOT  --  0.7   ALKPHOS  --  40*   AST  --  23   ALT  --  17   ANIONGAP 9 8   EGFRNONAA >60.0 >60.0         Microbiology Results (last 7 days)     Procedure Component Value Units Date/Time    Blood culture x two cultures. Draw prior to antibiotics. [[id number]] Collected:  03/19/20 2005    Order Status:  Completed Specimen:  Blood from Peripheral, Antecubital, Right Updated:  03/24/20 2232     Blood Culture, Routine No growth after 5 days.    Narrative:       Aerobic and anaerobic    Blood culture x two cultures. Draw prior to antibiotics. [[id number]] Collected:  03/19/20 1930    Order Status:  Completed Specimen:  Blood from Peripheral, Upper Arm, Right Updated:  03/24/20 2232     Blood Culture, Routine No growth after 5 days.    Narrative:       Aerobic and anaerobic    Culture, Respiratory with Gram Stain [[id number]] Collected:  03/22/20 1401    Order Status:  Completed Specimen:  Respiratory from Tracheal Aspirate Updated:  03/24/20 0713     Respiratory Culture Reduced normal respiratory indu     Gram Stain (Respiratory) <10 epithelial cells per low power field.     Gram Stain (Respiratory) Few WBC's     Gram Stain (Respiratory) Rare Gram positive cocci    Culture, Respiratory with Gram Stain [[id number]] Collected:  03/22/20 1401    Order Status:  Canceled Specimen:  Respiratory from Tracheal Aspirate     Strep A culture,  throat [[id number]] Collected:  03/19/20 2000    Order Status:  Completed Specimen:  Throat Updated:  03/22/20 0843     Strep A Culture No significant growth      No  Group A  Streptococcus isolated    Urine culture [[id number]] Collected:  03/19/20 2103    Order Status:  Completed Specimen:  Urine, Clean Catch Updated:  03/22/20 0749     Urine Culture, Routine No growth    Narrative:       Indicated criteria for high risk culture:->Other  Other (specify):->possible sepsis    MRSA Screen by PCR [[id number]] Collected:  03/19/20 2210    Order Status:  Completed Specimen:  Nasopharyngeal Swab from Nasal Updated:  03/20/20 0033     MRSA SCREEN BY PCR Negative    Throat Screen, Rapid [[id number]] Collected:  03/19/20 2000    Order Status:  Completed Specimen:  Throat Updated:  03/19/20 2041     Rapid Strep A Screen Negative     Comment: See Micro for reflexed Strep culture.               Assessment/Plan:      Active Hospital Problems    Diagnosis  POA    *Acute hypoxemic respiratory failure [J96.01]  Yes    Suspected Covid-19 Virus Infection [R68.89]  Yes     Priority: 2     Hypophosphatemia [E83.39]  No    Hypokalemia [E87.6]  No    Anemia [D64.9]  Yes    Pneumonia of both lungs due to infectious organism [J18.9]  Yes    Pharyngeal dysphagia [R13.13]  Yes    CVA (cerebral vascular accident) [I63.9]  Yes     Chronic    Gait instability [R26.81]  Yes    Aspiration pneumonia [J69.0]  Yes    HTN (hypertension) [I10]  Yes      Resolved Hospital Problems   No resolved problems to display.       Plan:  Clinically stable; transfer out of ICU   Continue to wean supplemental oxygen  Continue piperacillin-tazobactam for possible aspiration pneumonia - last day today; ID following   Continue hydroxychloroquine for suspected COVID-19; PCR is currently pending  Recent MBSS from 12/2019 with severe pharyngeal dysphagia  Family not ready for PEG tube; start TPN as per dietary recommendations  Air bone and droplet isolation  precautions for suspected COVID-19  Continue home medications for chronic medical conditions  QTc monitoring while on hydroxychloroquine (normal QTc on EKG 03/23). Will hold home mirtazapine for now    Dispo:  Pending COVID-19 testing after which patient can be transferred back to his nursing home. Long term plan for nutrition is unclear at this point       VTE Risk Mitigation (From admission, onward)         Ordered     enoxaparin injection 40 mg  Every 24 hours (non-standard times)      03/20/20 1439     IP VTE LOW RISK PATIENT  Once      03/20/20 0247     Place sequential compression device  Until discontinued      03/20/20 0247                      Elie Mcghee MD  Department of Hospital Medicine   [facility]

## 2020-03-25 NOTE — PLAN OF CARE
Problem: Infection  Goal: Infection Symptom Resolution  Outcome: Ongoing, Progressing     Problem: Adult Inpatient Plan of Care  Goal: Plan of Care Review  Outcome: Ongoing, Progressing  Goal: Patient-Specific Goal (Individualization)  Outcome: Ongoing, Progressing  Goal: Absence of Hospital-Acquired Illness or Injury  Outcome: Ongoing, Progressing  Goal: Optimal Comfort and Wellbeing  Outcome: Ongoing, Progressing  Goal: Readiness for Transition of Care  Outcome: Ongoing, Progressing  Goal: Rounds/Family Conference  Outcome: Ongoing, Progressing     Problem: Fall Injury Risk  Goal: Absence of Fall and Fall-Related Injury  Outcome: Ongoing, Progressing     Problem: Skin Injury Risk Increased  Goal: Skin Health and Integrity  Outcome: Ongoing, Progressing     Problem: Gas Exchange Impaired  Goal: Optimal Gas Exchange  Outcome: Ongoing, Progressing     Problem: Fluid Imbalance (Pneumonia)  Goal: Fluid Balance  Outcome: Ongoing, Progressing     Problem: Infection (Pneumonia)  Goal: Resolution of Infection Signs/Symptoms  Outcome: Ongoing, Progressing     Problem: Respiratory Compromise (Pneumonia)  Goal: Effective Oxygenation and Ventilation  Outcome: Ongoing, Progressing     Problem: Oral Intake Inadequate  Goal: Improved Oral Intake  Outcome: Ongoing, Progressing     Problem: Nutrition Impairment (Mechanical Ventilation, Invasive)  Goal: Optimal Nutrition Delivery  Outcome: Ongoing, Progressing     Problem: Noninvasive Ventilation Acute  Goal: Effective Unassisted Ventilation and Oxygenation  Outcome: Ongoing, Progressing     Problem: Restraint, Nonbehavioral (Nonviolent)  Goal: Personal Dignity and Safety Maintained  Outcome: Ongoing, Progressing     Problem: Parenteral Nutrition  Goal: Effective Intravenous Nutrition Therapy Delivery  Outcome: Ongoing, Progressing

## 2020-03-25 NOTE — PROGRESS NOTES
"[facility]  Adult Nutrition   Progress Note (Nutrition Support Management)    SUMMARY     Recommendations  Recommendation/Intervention:   1. New TPN ordered at 75ml/hr.   2. RD and NST to monitor and manage TPN daily.   3. Will recommend re-initiation of EN as medically able.   4. Recommend continued monitoring and management of blood glucose and electrolytes.     Goals: 1. Patient to tolerate PN. Nutrition provision to meet needs. 2. Blood glucose and electrolytes to trend towards normal limits/ target ranges.   Nutrition Goal Status: goal not met  Communication of RD Recs: discussed on rounds    Dietitian Rounds Brief  New TPN ordered. Patient still NPO awaiting possible PEG placement.     PN Composition:   83 grams Amino Acid, 150 grams Dextrose, 50gm Lipids  Today's TPN provides 1342 kcal.    Please see order for electrolytes and additives content and adjustments.   *The Nutrition Support Team will continue to monitor electrolytes daily and adjust parenteral nutrition as warranted.    Reason for Assessment  Reason For Assessment: RD follow-up, new TPN    Nutrition Risk Screen  Nutrition Risk Screen: dysphagia or difficulty swallowing     MST Score: 2  Have you recently lost weight without trying?: Unsure  Weight loss score: 2  Have you been eating poorly because of a decreased appetite?: No(Unable to assess)  Appetite score: 0       Nutrition/Diet History  Spiritual, Cultural Beliefs, Mandaen Practices, Values that Affect Care: other (see comments)(pt non verbal)  Food Allergies: NKFA  Factors Affecting Nutritional Intake: NPO, on mechanical ventilation, difficulty/impaired swallowing    Anthropometrics  Temp: 100.3 °F (37.9 °C)(nurse notified)  Height Method: Estimated  Height: 5' 5" (165.1 cm)  Height (inches): 65 in  Weight Method: Bed Scale  Weight: 69.1 kg (152 lb 5.4 oz)  Weight (lb): 152.34 lb  Ideal Body Weight (IBW), Male: 136 lb  % Ideal Body Weight, Male (lb): 109.1 %  BMI " (Calculated): 25.4  BMI Grade: 25 - 29.9 - overweight       Weight History:  Wt Readings from Last 10 Encounters:   03/20/20 69.1 kg (152 lb 5.4 oz)   03/20/20 69.1 kg (152 lb 5.4 oz)   12/12/19 68 kg (150 lb)   09/13/16 81.6 kg (180 lb)       Lab/Procedures/Meds: Pertinent Labs Reviewed  Clinical Chemistry:  Recent Labs   Lab 03/19/20 1930 03/22/20 0306 03/24/20 0410 03/25/20  0430      < > 141 142 137   K 4.1   < > 3.3* 3.6 3.2*      < > 108 111* 107   CO2 24   < > 20* 22* 22*      < > 103 91 132*   BUN 22   < > 20 10 13   CREATININE 0.7   < > 0.6 0.5 0.5   CALCIUM 9.1   < > 8.5* 8.4* 8.0*   PROT 7.3  --   --   --  6.4   ALBUMIN 3.9  --   --   --  2.8*   BILITOT 0.6  --   --   --  0.7   ALKPHOS 70  --   --   --  40*   AST 24  --   --   --  23   ALT 16  --   --   --  17   ANIONGAP 11   < > 13 9 8   ESTGFRAFRICA >60.0   < > >60.0 >60.0 >60.0   EGFRNONAA >60.0   < > >60.0 >60.0 >60.0   MG 2.1   < > 2.1 2.4 2.1   PHOS 3.3   < > 3.6 3.5 2.0*   LIPASE 35  --   --   --   --     < > = values in this interval not displayed.     CBC:   Recent Labs   Lab 03/25/20 0430   WBC 4.90   RBC 3.36*   HGB 10.7*   HCT 31.3*      MCV 93   MCH 31.8*   MCHC 34.2     Lipid Panel:  Recent Labs   Lab 03/25/20  0430   TRIG 130  130     Cardiac Profile:  Recent Labs   Lab 03/19/20 1930 03/22/20  0306   BNP 13  --    TROPONINI <0.030 <0.030     Inflammatory Labs:  Recent Labs   Lab 03/22/20  0306   CRP 7.64*     Medications: Pertinent Medications reviewed  Scheduled Meds:   aspirin  81 mg Per G Tube Daily    carbidopa-levodopa  mg  1 tablet Oral TID    chlorhexidine  15 mL Mouth/Throat BID    enoxparin  40 mg Subcutaneous Q24H    famotidine (PF)  20 mg Intravenous BID    lisinopriL  10 mg Oral QHS    mupirocin   Nasal BID    piperacillin-tazobactam (ZOSYN) IVPB  3.375 g Intravenous Q8H     Continuous Infusions:   TPN ADULT CENTRAL LINE CUSTOM (3 in 1) 75 mL/hr at 03/24/20 2007    TPN ADULT  CENTRAL LINE CUSTOM (3 in 1)       Estimated/Assessed Needs  Weight Used For Calorie Calculations: 69.1 kg (152 lb 5.4 oz)  Energy Calorie Requirements (kcal): 1213-2764 kcals/day (25-30 kcals/kg)  Energy Need Method: Kcal/kg  Protein Requirements: 69-90 g/day (1.0-1.3 g/kg)  Weight Used For Protein Calculations: 69.1 kg (152 lb 5.4 oz)     Estimated Fluid Requirement Method: RDA Method  RDA Method (mL): 1727     Nutrition Prescription Ordered  Current Diet Order: NPO    Evaluation of Received Nutrient/Fluid Intake  Other Calories (kcal): 0  Energy Calories Required: meeting needs  Protein Required: meeting needs  Fluid Required: meeting needs  Tolerance: tolerating     Intake/Output Summary (Last 24 hours) at 3/25/2020 1656  Last data filed at 3/25/2020 0600  Gross per 24 hour   Intake 4788.22 ml   Output 1625 ml   Net 3163.22 ml      % Intake of Estimated Energy Needs: 75 - 100 %  % Meal Intake: NPO    Nutrition Risk  Level of Risk/Frequency of Follow-up: high     Monitor and Evaluation  Food and Nutrient Intake: parenteral nutrition intake  Food and Nutrient Adminstration: enteral and parenteral nutrition administration  Physical Activity and Function: nutrition-related ADLs and IADLs  Anthropometric Measurements: weight change  Biochemical Data, Medical Tests and Procedures: electrolyte and renal panel, gastrointestinal profile, glucose/endocrine profile  Nutrition-Focused Physical Findings: overall appearance     Nutrition Follow-Up  RD Follow-up?: Yes

## 2020-03-25 NOTE — ASSESSMENT & PLAN NOTE
· Pt is not capable of eating safely  · He needs a PEG tube  · TPN should be stopped - refusal of enteral feeding tube is not an indication for TPN  · If family does not want a feeding tube then he should be DNR and on hospice

## 2020-03-25 NOTE — PROGRESS NOTES
Data reviewed remotely  I agree with Dr. Taylor  He is also afebrile and has received a 5 day course of hydroxychloroquine. Will discontinue this and azithromycin  COVID pending

## 2020-03-25 NOTE — PLAN OF CARE
Unable to discuss plan of care with patient due to level of consciousness. Rests well, no needs assessed. Turned q 2 hours, TPN as ordered.

## 2020-03-25 NOTE — PROGRESS NOTES
Replaced by [facility]  Pulmonology  Progress Note    Patient Name: Mukesh Addison  MRN: [id number]  Admission Date: 3/19/2020  Hospital Length of Stay: 5 days  Code Status: Full Code  Attending Provider: Elie Mcghee MD  Primary Care Provider: Primary Doctor No   Principal Problem: Acute hypoxemic respiratory failure    Subjective:     Interval History:     3/23/2020 - Pt stable overnight, looks better this A and O2 has been decreased.  Has not had much in the way of secretions.  We will plan to try a SBT and see how he does (hopefully we can extubate).  HGB has decreased but no active bleeding reported.    3/24/2020 - Stable overnight, proceeded with SBT with good parameters and was able to be extubated.  No new issues reported.  Not very awake when I saw him.  Sats 98% on NC O2 and in no distress.    3/25/2020 - Stable extubated, a little tachypneic but no distress and to move to floor.  I am told that family does not want a PEG but pt clearly aspirates.  He has been started on TPN.  He is nonverbal.    Review of Systems   Unable to perform ROS: Mental acuity         Objective:     Vital Signs (Most Recent):  Temp: 98.6 °F (37 °C) (03/25/20 1241)  Pulse: 70 (03/25/20 1241)  Resp: (!) 30 (03/25/20 1241)  BP: 126/69 (03/25/20 1241)  SpO2: (!) 91 % (03/25/20 1241) Vital Signs (24h Range):  Temp:  [97.7 °F (36.5 °C)-99 °F (37.2 °C)] 98.6 °F (37 °C)  Pulse:  [63-78] 70  Resp:  [18-50] 30  SpO2:  [91 %-100 %] 91 %  BP: (116-145)/(56-73) 126/69  Arterial Line BP: (140-180)/(43-69) 156/53     Weight: 69.1 kg (152 lb 5.4 oz)  Body mass index is 25.35 kg/m².      Intake/Output Summary (Last 24 hours) at 3/25/2020 1339  Last data filed at 3/25/2020 0600  Gross per 24 hour   Intake 4788.22 ml   Output 1625 ml   Net 3163.22 ml       Physical Exam   Constitutional: He appears well-developed and well-nourished. No distress.   chronically ill male  Just extubated, no distress   HENT:   Head: Normocephalic and atraumatic.    Right Ear: External ear normal.   Left Ear: External ear normal.   Nose: Nose normal.   Mouth/Throat: Oropharynx is clear and moist.   Eyes: Pupils are equal, round, and reactive to light. EOM are normal.   Neck: Normal range of motion. Neck supple. No JVD present. No tracheal deviation present. No thyromegaly present.   Cardiovascular: Normal rate, regular rhythm, normal heart sounds and intact distal pulses. Exam reveals no gallop and no friction rub.   No murmur heard.  Pulmonary/Chest: Effort normal and breath sounds normal. No stridor. No respiratory distress. He has no wheezes. He has no rales. He exhibits no tenderness.   Abdominal: Soft. Bowel sounds are normal. He exhibits no distension. There is no tenderness. There is no rebound and no guarding.   Genitourinary:   Genitourinary Comments: holbrook   Musculoskeletal: Normal range of motion. He exhibits no edema or tenderness.   Lymphadenopathy:     He has no cervical adenopathy.   Neurological: He has normal reflexes. No cranial nerve deficit.   Still sleepy and cannot fully assess   Skin: He is not diaphoretic.   Psychiatric: He has a normal mood and affect. His behavior is normal.   calm   Nursing note and vitals reviewed.      Vents:  Vent Mode: Spont (03/24/20 0524)  Ventilator Initiated: Yes (03/22/20 0255)  Set Rate: 16 BPM (03/24/20 0430)  Vt Set: 350 mL (03/24/20 0524)  Pressure Support: 10 cmH20 (03/24/20 0524)  PEEP/CPAP: 5 cmH20 (03/24/20 0524)  Oxygen Concentration (%): 30 (03/24/20 0701)  Peak Airway Pressure: 16 cmH2O (03/24/20 0524)  Plateau Pressure: 12 cmH20 (03/24/20 0524)  Total Ve: 16.3 mL (03/24/20 0524)  F/VT Ratio<105 (RSBI): (!) 63.27 (03/24/20 0524)    Lines/Drains/Airways     Peripherally Inserted Central Catheter Line            PICC Triple Lumen 03/24/20 0850 left brachial;left basilic 1 day          Drain                 Urethral Catheter 03/19/20 2102 Straight-tip 16 Fr. 5 days                Significant Labs:    CBC/Anemia  Profile:  Recent Labs   Lab 03/24/20 0410 03/24/20  0524 03/25/20  0430   WBC 3.53*  --  4.90   HGB 10.9*  --  10.7*   HCT 33.2* 32* 31.3*     --  207   MCV 97  --  93   RDW 12.5  --  12.0        Chemistries:  Recent Labs   Lab 03/24/20 0410 03/25/20  0430    137   K 3.6 3.2*   * 107   CO2 22* 22*   BUN 10 13   CREATININE 0.5 0.5   CALCIUM 8.4* 8.0*   ALBUMIN  --  2.8*   PROT  --  6.4   BILITOT  --  0.7   ALKPHOS  --  40*   ALT  --  17   AST  --  23   MG 2.4 2.1   PHOS 3.5 2.0*       All pertinent labs within the past 24 hours have been reviewed.    Recent Labs     03/24/20 0524   PH 7.432   PCO2 35.0   PO2 60*   HCO3 23.4*   POCSATURATED 92*   BE -1     Microbiology Results (last 7 days)     Procedure Component Value Units Date/Time    Blood culture x two cultures. Draw prior to antibiotics. [[id number]] Collected:  03/19/20 2005    Order Status:  Completed Specimen:  Blood from Peripheral, Antecubital, Right Updated:  03/24/20 2232     Blood Culture, Routine No growth after 5 days.    Narrative:       Aerobic and anaerobic    Blood culture x two cultures. Draw prior to antibiotics. [[id number]] Collected:  03/19/20 1930    Order Status:  Completed Specimen:  Blood from Peripheral, Upper Arm, Right Updated:  03/24/20 2232     Blood Culture, Routine No growth after 5 days.    Narrative:       Aerobic and anaerobic    Culture, Respiratory with Gram Stain [[id number]] Collected:  03/22/20 1401    Order Status:  Completed Specimen:  Respiratory from Tracheal Aspirate Updated:  03/24/20 0713     Respiratory Culture Reduced normal respiratory indu     Gram Stain (Respiratory) <10 epithelial cells per low power field.     Gram Stain (Respiratory) Few WBC's     Gram Stain (Respiratory) Rare Gram positive cocci    Culture, Respiratory with Gram Stain [[id number]] Collected:  03/22/20 1401    Order Status:  Canceled Specimen:  Respiratory from Tracheal Aspirate     Strep A culture, throat [[id number]]  Collected:  03/19/20 2000    Order Status:  Completed Specimen:  Throat Updated:  03/22/20 0843     Strep A Culture No significant growth      No  Group A  Streptococcus isolated    Urine culture [[id number]] Collected:  03/19/20 2103    Order Status:  Completed Specimen:  Urine, Clean Catch Updated:  03/22/20 0749     Urine Culture, Routine No growth    Narrative:       Indicated criteria for high risk culture:->Other  Other (specify):->possible sepsis    MRSA Screen by PCR [[id number]] Collected:  03/19/20 2210    Order Status:  Completed Specimen:  Nasopharyngeal Swab from Nasal Updated:  03/20/20 0033     MRSA SCREEN BY PCR Negative    Throat Screen, Rapid [[id number]] Collected:  03/19/20 2000    Order Status:  Completed Specimen:  Throat Updated:  03/19/20 2041     Rapid Strep A Screen Negative     Comment: See Micro for reflexed Strep culture.               Significant Imaging:  I have reviewed and interpreted all pertinent imaging results/findings within the past 24 hours.     CXR (3/23) - bilateral infiltrates +/- better  CXR (3/24) - + infiltrates NG malpositioned (addressed)    EKG  Vent. Rate : 076 BPM     Atrial Rate : 076 BPM     P-R Int : 116 ms          QRS Dur : 078 ms      QT Int : 376 ms       P-R-T Axes : 050 028 036 degrees     QTc Int : 423 ms    Normal sinus rhythm  Normal ECG  When compared with ECG of 19-MAR-2020 19:48,  No significant change was found  Confirmed by Jesus DOMINGUEZ, Caio MCLEAN ([id number]) on 3/23/2020 11:33:03 AM    Assessment/Plan:     * Acute hypoxemic respiratory failure  · Now extubated and stable  · OK to transfer to floor    Aspiration pneumonia  · By history  · Continue antibiotics  · Better     Suspected Covid-19 Virus Infection  · Mami says they have NOT had any + Covid pt  · Test pending  · Take precautions    Anemia  · H/H has decreased but no reported blood loss  ·  follow and transfuse for hgb < 7    Hypokalemia  · Replace     CVA (cerebral vascular accident)  · Aware      Pharyngeal dysphagia  · Pt is not capable of eating safely  · He needs a PEG tube  · TPN should be stopped - refusal of enteral feeding tube is not an indication for TPN  · If family does not want a feeding tube then he should be DNR and on hospice    Pneumonia of both lungs due to infectious organism  · As above, continue treatments    HTN (hypertension)  · Aware, follow            You Taylor MD  Pulmonology  [facility]

## 2020-03-25 NOTE — PLAN OF CARE
03/24/20 1950   Patient Assessment/Suction   Respiratory Effort Unlabored;Normal   Expansion/Accessory Muscles/Retractions no retractions   All Lung Fields Breath Sounds clear   Rhythm/Pattern, Respiratory pattern regular   Cough Frequency infrequent   Cough Type nonproductive   PRE-TX-O2   O2 Device (Oxygen Therapy) nasal cannula   $ Is the patient on Low Flow Oxygen? Yes   Flow (L/min) 3   SpO2 99 %   Pulse Oximetry Type Continuous   $ Pulse Oximetry - Multiple Charge Pulse Oximetry - Multiple   Pulse 70   Resp (!) 37   Aerosol Therapy   $ Aerosol Therapy Charges PRN treatment not required   Respiratory Evaluation   $ Care Plan Tech Time 15 min   Evaluation For   (care plan)

## 2020-03-26 LAB
ALBUMIN SERPL BCP-MCNC: 2.8 G/DL (ref 3.5–5.2)
ALLENS TEST: ABNORMAL
ALP SERPL-CCNC: 37 U/L (ref 55–135)
ALT SERPL W/O P-5'-P-CCNC: 22 U/L (ref 10–44)
ANION GAP SERPL CALC-SCNC: 9 MMOL/L (ref 8–16)
AST SERPL-CCNC: 35 U/L (ref 10–40)
BASOPHILS # BLD AUTO: 0.01 K/UL (ref 0–0.2)
BASOPHILS NFR BLD: 0.2 % (ref 0–1.9)
BILIRUB SERPL-MCNC: 0.5 MG/DL (ref 0.1–1)
BUN SERPL-MCNC: 13 MG/DL (ref 8–23)
CALCIUM SERPL-MCNC: 7.8 MG/DL (ref 8.7–10.5)
CHLORIDE SERPL-SCNC: 106 MMOL/L (ref 95–110)
CO2 SERPL-SCNC: 24 MMOL/L (ref 23–29)
CREAT SERPL-MCNC: 0.5 MG/DL (ref 0.5–1.4)
DELSYS: ABNORMAL
DIFFERENTIAL METHOD: ABNORMAL
EOSINOPHIL # BLD AUTO: 0 K/UL (ref 0–0.5)
EOSINOPHIL NFR BLD: 0.5 % (ref 0–8)
ERYTHROCYTE [DISTWIDTH] IN BLOOD BY AUTOMATED COUNT: 12 % (ref 11.5–14.5)
EST. GFR  (AFRICAN AMERICAN): >60 ML/MIN/1.73 M^2
EST. GFR  (NON AFRICAN AMERICAN): >60 ML/MIN/1.73 M^2
GLUCOSE SERPL-MCNC: 111 MG/DL (ref 70–110)
GLUCOSE SERPL-MCNC: 112 MG/DL (ref 70–110)
GLUCOSE SERPL-MCNC: 127 MG/DL (ref 70–110)
GLUCOSE SERPL-MCNC: 133 MG/DL (ref 70–110)
GLUCOSE SERPL-MCNC: 139 MG/DL (ref 70–110)
HCO3 UR-SCNC: 24.8 MMOL/L (ref 24–28)
HCT VFR BLD AUTO: 32.5 % (ref 40–54)
HCT VFR BLD CALC: 30 %PCV (ref 36–54)
HGB BLD-MCNC: 11 G/DL (ref 14–18)
IMM GRANULOCYTES # BLD AUTO: 0.03 K/UL (ref 0–0.04)
IMM GRANULOCYTES NFR BLD AUTO: 0.5 % (ref 0–0.5)
LYMPHOCYTES # BLD AUTO: 1 K/UL (ref 1–4.8)
LYMPHOCYTES NFR BLD: 17.8 % (ref 18–48)
MAGNESIUM SERPL-MCNC: 2.1 MG/DL (ref 1.6–2.6)
MCH RBC QN AUTO: 31.4 PG (ref 27–31)
MCHC RBC AUTO-ENTMCNC: 33.8 G/DL (ref 32–36)
MCV RBC AUTO: 93 FL (ref 82–98)
MONOCYTES # BLD AUTO: 0.5 K/UL (ref 0.3–1)
MONOCYTES NFR BLD: 8.7 % (ref 4–15)
NEUTROPHILS # BLD AUTO: 4 K/UL (ref 1.8–7.7)
NEUTROPHILS NFR BLD: 72.3 % (ref 38–73)
NRBC BLD-RTO: 0 /100 WBC
PCO2 BLDA: 31.9 MMHG (ref 35–45)
PH SMN: 7.5 [PH] (ref 7.35–7.45)
PHOSPHATE SERPL-MCNC: 3 MG/DL (ref 2.7–4.5)
PLATELET # BLD AUTO: 188 K/UL (ref 150–350)
PMV BLD AUTO: 10.9 FL (ref 9.2–12.9)
PO2 BLDA: 61 MMHG (ref 80–100)
POC BE: 2 MMOL/L
POC IONIZED CALCIUM: 1.11 MMOL/L (ref 1.06–1.42)
POC SATURATED O2: 93 % (ref 95–100)
POC TCO2: 26 MMOL/L (ref 23–27)
POTASSIUM BLD-SCNC: 3.3 MMOL/L (ref 3.5–5.1)
POTASSIUM SERPL-SCNC: 3.6 MMOL/L (ref 3.5–5.1)
PROT SERPL-MCNC: 6.4 G/DL (ref 6–8.4)
RBC # BLD AUTO: 3.5 M/UL (ref 4.6–6.2)
SAMPLE: ABNORMAL
SITE: ABNORMAL
SODIUM BLD-SCNC: 136 MMOL/L (ref 136–145)
SODIUM SERPL-SCNC: 139 MMOL/L (ref 136–145)
WBC # BLD AUTO: 5.5 K/UL (ref 3.9–12.7)

## 2020-03-26 PROCEDURE — A4217 STERILE WATER/SALINE, 500 ML: HCPCS | Performed by: INTERNAL MEDICINE

## 2020-03-26 PROCEDURE — 80053 COMPREHEN METABOLIC PANEL: CPT

## 2020-03-26 PROCEDURE — 27000221 HC OXYGEN, UP TO 24 HOURS

## 2020-03-26 PROCEDURE — 25000003 PHARM REV CODE 250: Performed by: INTERNAL MEDICINE

## 2020-03-26 PROCEDURE — 99233 PR SUBSEQUENT HOSPITAL CARE,LEVL III: ICD-10-PCS | Mod: ,,, | Performed by: INTERNAL MEDICINE

## 2020-03-26 PROCEDURE — 84100 ASSAY OF PHOSPHORUS: CPT

## 2020-03-26 PROCEDURE — 99900035 HC TECH TIME PER 15 MIN (STAT)

## 2020-03-26 PROCEDURE — 84132 ASSAY OF SERUM POTASSIUM: CPT

## 2020-03-26 PROCEDURE — 36415 COLL VENOUS BLD VENIPUNCTURE: CPT

## 2020-03-26 PROCEDURE — S0028 INJECTION, FAMOTIDINE, 20 MG: HCPCS | Performed by: INTERNAL MEDICINE

## 2020-03-26 PROCEDURE — 63600175 PHARM REV CODE 636 W HCPCS: Performed by: INTERNAL MEDICINE

## 2020-03-26 PROCEDURE — 83735 ASSAY OF MAGNESIUM: CPT

## 2020-03-26 PROCEDURE — 21400001 HC TELEMETRY ROOM

## 2020-03-26 PROCEDURE — B4185 PARENTERAL SOL 10 GM LIPIDS: HCPCS | Performed by: INTERNAL MEDICINE

## 2020-03-26 PROCEDURE — 84295 ASSAY OF SERUM SODIUM: CPT

## 2020-03-26 PROCEDURE — 36600 WITHDRAWAL OF ARTERIAL BLOOD: CPT

## 2020-03-26 PROCEDURE — 85025 COMPLETE CBC W/AUTO DIFF WBC: CPT

## 2020-03-26 PROCEDURE — 94761 N-INVAS EAR/PLS OXIMETRY MLT: CPT

## 2020-03-26 PROCEDURE — 85014 HEMATOCRIT: CPT

## 2020-03-26 PROCEDURE — 82962 GLUCOSE BLOOD TEST: CPT

## 2020-03-26 PROCEDURE — 31720 CLEARANCE OF AIRWAYS: CPT

## 2020-03-26 PROCEDURE — 99233 SBSQ HOSP IP/OBS HIGH 50: CPT | Mod: ,,, | Performed by: INTERNAL MEDICINE

## 2020-03-26 PROCEDURE — 82803 BLOOD GASES ANY COMBINATION: CPT

## 2020-03-26 RX ORDER — ALBUTEROL SULFATE 90 UG/1
2 AEROSOL, METERED RESPIRATORY (INHALATION) EVERY 6 HOURS PRN
Status: DISCONTINUED | OUTPATIENT
Start: 2020-03-26 | End: 2020-03-29

## 2020-03-26 RX ORDER — MORPHINE SULFATE 2 MG/ML
2 INJECTION, SOLUTION INTRAMUSCULAR; INTRAVENOUS EVERY 4 HOURS PRN
Status: DISCONTINUED | OUTPATIENT
Start: 2020-03-26 | End: 2020-04-07 | Stop reason: HOSPADM

## 2020-03-26 RX ADMIN — CHLORHEXIDINE GLUCONATE 15 ML: 1.2 RINSE ORAL at 09:03

## 2020-03-26 RX ADMIN — CHLORHEXIDINE GLUCONATE 15 ML: 1.2 RINSE ORAL at 08:03

## 2020-03-26 RX ADMIN — MAGNESIUM SULFATE HEPTAHYDRATE: 500 INJECTION, SOLUTION INTRAMUSCULAR; INTRAVENOUS at 06:03

## 2020-03-26 RX ADMIN — MUPIROCIN: 20 OINTMENT TOPICAL at 09:03

## 2020-03-26 RX ADMIN — FAMOTIDINE 20 MG: 10 INJECTION INTRAVENOUS at 08:03

## 2020-03-26 RX ADMIN — MUPIROCIN: 20 OINTMENT TOPICAL at 08:03

## 2020-03-26 RX ADMIN — MORPHINE SULFATE 2 MG: 2 INJECTION, SOLUTION INTRAMUSCULAR; INTRAVENOUS at 08:03

## 2020-03-26 RX ADMIN — PIPERACILLIN AND TAZOBACTAM 3.38 G: 3; .375 INJECTION, POWDER, FOR SOLUTION INTRAVENOUS at 04:03

## 2020-03-26 RX ADMIN — FAMOTIDINE 20 MG: 10 INJECTION INTRAVENOUS at 09:03

## 2020-03-26 RX ADMIN — ENOXAPARIN SODIUM 40 MG: 100 INJECTION SUBCUTANEOUS at 02:03

## 2020-03-26 RX ADMIN — PIPERACILLIN AND TAZOBACTAM 3.38 G: 3; .375 INJECTION, POWDER, FOR SOLUTION INTRAVENOUS at 02:03

## 2020-03-26 NOTE — PLAN OF CARE
Problem: Infection  Goal: Infection Symptom Resolution  3/26/2020 1640 by Gloria Soto RN  Outcome: Ongoing, Progressing  3/26/2020 1636 by Gloria Soto RN  Outcome: Ongoing, Progressing     Problem: Adult Inpatient Plan of Care  Goal: Plan of Care Review  3/26/2020 1640 by Gloria Soto RN  Outcome: Ongoing, Progressing  3/26/2020 1636 by Gloria Soto RN  Outcome: Ongoing, Progressing  Goal: Patient-Specific Goal (Individualization)  3/26/2020 1640 by Gloria Soto RN  Outcome: Ongoing, Progressing  3/26/2020 1636 by Gloria Soto RN  Outcome: Ongoing, Progressing  Goal: Absence of Hospital-Acquired Illness or Injury  3/26/2020 1640 by Gloria Soto RN  Outcome: Ongoing, Progressing  3/26/2020 1636 by Gloria Soto RN  Outcome: Ongoing, Progressing  Goal: Optimal Comfort and Wellbeing  3/26/2020 1640 by Gloria Soto RN  Outcome: Ongoing, Progressing  3/26/2020 1636 by Gloria Soto RN  Outcome: Ongoing, Progressing  Goal: Readiness for Transition of Care  3/26/2020 1640 by Gloria Soto RN  Outcome: Ongoing, Progressing  3/26/2020 1636 by Gloria Soto RN  Outcome: Ongoing, Progressing  Goal: Rounds/Family Conference  3/26/2020 1640 by Gloria Soto RN  Outcome: Ongoing, Progressing  3/26/2020 1636 by Gloria Soto RN  Outcome: Ongoing, Progressing     Problem: Fall Injury Risk  Goal: Absence of Fall and Fall-Related Injury  3/26/2020 1640 by Gloria Soto RN  Outcome: Ongoing, Progressing  3/26/2020 1636 by Gloria Soto RN  Outcome: Ongoing, Progressing     Problem: Skin Injury Risk Increased  Goal: Skin Health and Integrity  3/26/2020 1640 by Gloria Soto RN  Outcome: Ongoing, Progressing  3/26/2020 1636 by Gloria Soto RN  Outcome: Ongoing, Progressing     Problem: Gas Exchange Impaired  Goal: Optimal Gas Exchange  3/26/2020 1640 by Gloria Soto RN  Outcome: Ongoing, Progressing  3/26/2020 1636 by Gloria Soto RN  Outcome:  Ongoing, Progressing     Problem: Fluid Imbalance (Pneumonia)  Goal: Fluid Balance  3/26/2020 1640 by Gloria Soto RN  Outcome: Ongoing, Progressing  3/26/2020 1636 by Gloria Soto RN  Outcome: Ongoing, Progressing     Problem: Infection (Pneumonia)  Goal: Resolution of Infection Signs/Symptoms  3/26/2020 1640 by Gloria Soto RN  Outcome: Ongoing, Progressing  3/26/2020 1636 by Gloria Soto RN  Outcome: Ongoing, Progressing     Problem: Respiratory Compromise (Pneumonia)  Goal: Effective Oxygenation and Ventilation  3/26/2020 1640 by Gloria Soto RN  Outcome: Ongoing, Progressing  3/26/2020 1636 by Gloria Soto RN  Outcome: Ongoing, Progressing     Problem: Oral Intake Inadequate  Goal: Improved Oral Intake  3/26/2020 1640 by Gloria Soto RN  Outcome: Ongoing, Progressing  3/26/2020 1636 by Gloria Soto RN  Outcome: Ongoing, Progressing     Problem: Noninvasive Ventilation Acute  Goal: Effective Unassisted Ventilation and Oxygenation  3/26/2020 1640 by Gloria Soto RN  Outcome: Ongoing, Progressing  3/26/2020 1636 by Gloria Soto RN  Outcome: Ongoing, Progressing     Problem: Parenteral Nutrition  Goal: Effective Intravenous Nutrition Therapy Delivery  3/26/2020 1640 by Gloria Soto RN  Outcome: Ongoing, Progressing  3/26/2020 1636 by Gloria Soto RN  Outcome: Ongoing, Progressing

## 2020-03-26 NOTE — NURSING
During rounds, patient noted to be heavily drooling out mouth, thick saliva. Tachypnic at 36 resp/min. Abdominal and thoracic accessory muscles in use. Coarse/rattle in back of throat and in chest noted. Suction set up at bedside. Patient with clinched jaw. Unable to use yanker. Respiratory was called to bedside. O2 sat was 90% on 3L NC, turned up to 4.5L NC with improvement of sat to 94%. MD notified. NT suction, chest xray, and ABGs ordered. RT performed NT suction with much improvement in patient symptoms. Breaths at 22resp/min. No rattling noted. Patient resting comfortably at this time. Will continue to monitor.

## 2020-03-26 NOTE — PROGRESS NOTES
[facility]  Progress Note    Patient Name: Mukesh Addison  MRN: [id number]  Patient Class: IP- Inpatient   Admission Date: 3/19/2020  Length of Stay: 6 days  Attending Physician: Elie Mcghee MD  Primary Care Provider: Primary Doctor No        Subjective:     Principal Problem:Acute hypoxemic respiratory failure      Interval History:  Overnight events reviewed.  Patient developed acute respiratory distress secondary to difficulty clearing secretions which improved after nasotracheal suction.  Continues to remain NPO.  Receiving TPN.    Review of Systems   Unable to perform ROS: Patient nonverbal     Objective:     Vital Signs (Most Recent):  Temp: 98.7 °F (37.1 °C) (03/26/20 1055)  Pulse: 70 (03/26/20 1055)  Resp: 18 (03/26/20 1055)  BP: 134/72 (03/26/20 1055)  SpO2: (!) 94 % (03/26/20 1055) Vital Signs (24h Range):  Temp:  [98.7 °F (37.1 °C)-100 °F (37.8 °C)] 98.7 °F (37.1 °C)  Pulse:  [] 70  Resp:  [16-18] 18  SpO2:  [91 %-96 %] 94 %  BP: (123-157)/(70-90) 134/72     Weight: 69.1 kg (152 lb 5.4 oz)  Body mass index is 25.35 kg/m².    Intake/Output Summary (Last 24 hours) at 3/26/2020 1519  Last data filed at 3/25/2020 2100  Gross per 24 hour   Intake 0 ml   Output --   Net 0 ml      Physical Exam   Constitutional:   Frail  male in no acute distress   HENT:   Head: Normocephalic and atraumatic.   Eyes: Conjunctivae are normal. No scleral icterus.   Neck: Neck supple. No thyromegaly present.   Cardiovascular: Normal rate, regular rhythm, normal heart sounds and intact distal pulses.   Pulmonary/Chest:   Scattered coarse rhonchi (R>L)   Abdominal: Soft. Bowel sounds are normal. He exhibits no distension.   Musculoskeletal: He exhibits deformity. He exhibits no edema.   Mild contractures to upper and lower extremities   Neurological: He is alert. He displays no atrophy.   Non-focal    Skin: Skin is warm and dry. Capillary refill takes less than 2 seconds. He is not  diaphoretic.   Psychiatric: He has a normal mood and affect. His behavior is normal.   Nursing note and vitals reviewed.      Significant Labs:   CBC:   Recent Labs   Lab 03/25/20  0430 03/26/20  0314 03/26/20  0804   WBC 4.90  --  5.50   HGB 10.7*  --  11.0*   HCT 31.3* 30* 32.5*     --  188     CMP:   Recent Labs   Lab 03/25/20  0430 03/26/20  0804    139   K 3.2* 3.6    106   CO2 22* 24   * 127*   BUN 13 13   CREATININE 0.5 0.5   CALCIUM 8.0* 7.8*   PROT 6.4 6.4   ALBUMIN 2.8* 2.8*   BILITOT 0.7 0.5   ALKPHOS 40* 37*   AST 23 35   ALT 17 22   ANIONGAP 8 9   EGFRNONAA >60.0 >60.0     Procedure Component Value Units Date/Time   X-Ray Chest AP Portable [[id number]] Collected: 03/26/20 0258   Order Status: Completed Updated: 03/26/20 1134   Narrative:     PROCEDURE:   XR CHEST AP PORTABLE  dated  3/26/2020 2:58 AM    CLINICAL HISTORY:   Male 69 years of age.   shortness of breath    TECHNIQUE: AP view of the chest obtained portably at 2:58 AM.    PREVIOUS STUDIES:  March 24, 2020 at 9:45 AM and 4:45 AM    FINDINGS:    Cardiac and mediastinal contours are normal. Lungs are clear. There is  no pleural effusion or pneumothorax. Bones are unremarkable.  Previous malpositioned feeding tube has been removed. PICC from left  upper extremity access terminates in the lower SVC. Cardiac and  mediastinal contours are stable. Interstitial and alveolar infiltrates  are within both lungs. These are fairly well distributed throughout  the left lung and the right mid to upper lung, with lesser involvement  of the right lower lung. Although the infiltrates appear to be more  dense (worsened) compared with the most recent study performed at 9:45  AM the day before, they are unchanged compared with the exam performed  4:45 AM the day before. There is no pleural effusion or pneumothorax.  Air-filled bowel in the visualized upper abdomen is not dilated.    IMPRESSION:      1. There has been interval removal of  previous feeding tube that had  been within the right lower lobe distal airway.  2. The appearance of worsening of extensive bilateral infiltrates  compared with the most recent exam is probably artifactual/technique  related, given that there is no change compared with the exam  performed shortly before the most recent exam (9:45 AM versus 4:45 AM  on March 24).    Electronically Signed by Elana Domingo on 3/26/2020 7:07 AM         Microbiology Results (last 7 days)     Procedure Component Value Units Date/Time    Blood culture x two cultures. Draw prior to antibiotics. [[id number]] Collected:  03/19/20 2005    Order Status:  Completed Specimen:  Blood from Peripheral, Antecubital, Right Updated:  03/24/20 2232     Blood Culture, Routine No growth after 5 days.    Narrative:       Aerobic and anaerobic    Blood culture x two cultures. Draw prior to antibiotics. [[id number]] Collected:  03/19/20 1930    Order Status:  Completed Specimen:  Blood from Peripheral, Upper Arm, Right Updated:  03/24/20 2232     Blood Culture, Routine No growth after 5 days.    Narrative:       Aerobic and anaerobic    Culture, Respiratory with Gram Stain [[id number]] Collected:  03/22/20 1401    Order Status:  Completed Specimen:  Respiratory from Tracheal Aspirate Updated:  03/24/20 0713     Respiratory Culture Reduced normal respiratory indu     Gram Stain (Respiratory) <10 epithelial cells per low power field.     Gram Stain (Respiratory) Few WBC's     Gram Stain (Respiratory) Rare Gram positive cocci    Culture, Respiratory with Gram Stain [[id number]] Collected:  03/22/20 1401    Order Status:  Canceled Specimen:  Respiratory from Tracheal Aspirate     Strep A culture, throat [[id number]] Collected:  03/19/20 2000    Order Status:  Completed Specimen:  Throat Updated:  03/22/20 0843     Strep A Culture No significant growth      No  Group A  Streptococcus isolated    Urine culture [[id number]] Collected:  03/19/20 2103    Order Status:   Completed Specimen:  Urine, Clean Catch Updated:  03/22/20 0749     Urine Culture, Routine No growth    Narrative:       Indicated criteria for high risk culture:->Other  Other (specify):->possible sepsis    MRSA Screen by PCR [[id number]] Collected:  03/19/20 2210    Order Status:  Completed Specimen:  Nasopharyngeal Swab from Nasal Updated:  03/20/20 0033     MRSA SCREEN BY PCR Negative    Throat Screen, Rapid [[id number]] Collected:  03/19/20 2000    Order Status:  Completed Specimen:  Throat Updated:  03/19/20 2041     Rapid Strep A Screen Negative     Comment: See Micro for reflexed Strep culture.               Assessment/Plan:      Active Hospital Problems    Diagnosis  POA    *Acute hypoxemic respiratory failure [J96.01]  Yes    Suspected Covid-19 Virus Infection [R68.89]  Yes     Priority: 2     Hypophosphatemia [E83.39]  No    Hypokalemia [E87.6]  No    Anemia [D64.9]  Yes    Pneumonia of both lungs due to infectious organism [J18.9]  Yes    Pharyngeal dysphagia [R13.13]  Yes    CVA (cerebral vascular accident) [I63.9]  Yes     Chronic    Gait instability [R26.81]  Yes    Aspiration pneumonia [J69.0]  Yes    HTN (hypertension) [I10]  Yes      Resolved Hospital Problems   No resolved problems to display.       Plan:  Continue to wean supplemental oxygen  S/p treatment of aspiration pneumonia with piperacillin-tazobactam.  Suspected COVID-19; PCR is currently pending  Recent MBSS from 12/2019 with severe pharyngeal dysphagia  Family not ready for PEG tube; continue TPN  Strict NPO  Air bone and droplet isolation precautions for suspected COVID-19  Continue home medications for chronic medical conditions    Dispo:  Pending COVID-19 testing after which patient can be transferred back to his nursing home. Long term prognosis is poor      VTE Risk Mitigation (From admission, onward)         Ordered     enoxaparin injection 40 mg  Every 24 hours (non-standard times)      03/20/20 1439     IP VTE LOW RISK  PATIENT  Once      03/20/20 0247     Place sequential compression device  Until discontinued      03/20/20 0247                      Elie Mcghee MD  Department of Hospital Medicine   [facility]

## 2020-03-26 NOTE — PROGRESS NOTES
RN called to notify me of O2 saturation 90% with thick secretions that needed to be suctioned. Order given for NT suction.  ABG and CXR ordered. Respiratory was able to suction a good deal of secretions and patient has clinically improved.  ABG currently appropriate.  RN reports he is less tachypneic.  CXR done and bilateral infiltrates persist.  Official radiology read is pending.

## 2020-03-26 NOTE — PLAN OF CARE
Problem: Infection  Goal: Infection Symptom Resolution  Outcome: Ongoing, Progressing     Problem: Adult Inpatient Plan of Care  Goal: Plan of Care Review  Outcome: Ongoing, Progressing  Goal: Patient-Specific Goal (Individualization)  Outcome: Ongoing, Progressing  Goal: Absence of Hospital-Acquired Illness or Injury  Outcome: Ongoing, Progressing  Goal: Optimal Comfort and Wellbeing  Outcome: Ongoing, Progressing  Goal: Readiness for Transition of Care  Outcome: Ongoing, Progressing  Goal: Rounds/Family Conference  Outcome: Ongoing, Progressing     Problem: Fall Injury Risk  Goal: Absence of Fall and Fall-Related Injury  Outcome: Ongoing, Progressing     Problem: Skin Injury Risk Increased  Goal: Skin Health and Integrity  Outcome: Ongoing, Progressing     Problem: Gas Exchange Impaired  Goal: Optimal Gas Exchange  Outcome: Ongoing, Progressing     Problem: Fluid Imbalance (Pneumonia)  Goal: Fluid Balance  Outcome: Ongoing, Progressing     Problem: Infection (Pneumonia)  Goal: Resolution of Infection Signs/Symptoms  Outcome: Ongoing, Progressing     Problem: Respiratory Compromise (Pneumonia)  Goal: Effective Oxygenation and Ventilation  Outcome: Ongoing, Progressing     Problem: Oral Intake Inadequate  Goal: Improved Oral Intake  Outcome: Ongoing, Progressing     Problem: Noninvasive Ventilation Acute  Goal: Effective Unassisted Ventilation and Oxygenation  Outcome: Ongoing, Progressing     Problem: Parenteral Nutrition  Goal: Effective Intravenous Nutrition Therapy Delivery  Outcome: Ongoing, Progressing

## 2020-03-26 NOTE — PROGRESS NOTES
"Progress Note  Pulmonary/Critical Care      Admit Date: 3/19/2020    SUBJECTIVE:     HPI/Interval history (See H&P for complete P,F,SHx) :     3/23/2020 - Pt stable overnight, looks better this A and O2 has been decreased.  Has not had much in the way of secretions.  We will plan to try a SBT and see how he does (hopefully we can extubate).  HGB has decreased but no active bleeding reported.     3/24/2020 - Stable overnight, proceeded with SBT with good parameters and was able to be extubated.  No new issues reported.  Not very awake when I saw him.  Sats 98% on NC O2 and in no distress.     3/25/2020 - Stable extubated, a little tachypneic but no distress and to move to floor.  I am told that family does not want a PEG but pt clearly aspirates.  He has been started on TPN.  He is nonverbal    3/26/2020 - Stable overnight, transferred to floor and in no distress.  Eyes are open but he doesn't talk to me.  Cough mechanics are not good.  He did have problems with clearing secretions this AM (see Dr Cleveland's notes)    Review of Systems: List if applicable    Review of Systems   Unable to perform ROS: Mental acuity       OBJECTIVE:     Vital Signs Range (Last 24H):  Temp:  [98.7 °F (37.1 °C)-100.3 °F (37.9 °C)]   Pulse:  []   Resp:  [16-20]   BP: (123-157)/(67-90)   SpO2:  [91 %-96 %]     I & O (Last 24H):    Intake/Output Summary (Last 24 hours) at 3/26/2020 1425  Last data filed at 3/25/2020 2100  Gross per 24 hour   Intake 0 ml   Output --   Net 0 ml       Estimated body mass index is 25.35 kg/m² as calculated from the following:    Height as of this encounter: 5' 5" (1.651 m).    Weight as of this encounter: 69.1 kg (152 lb 5.4 oz).    Vent Settings-      ABG  Recent Labs   Lab 03/26/20  0314   PH 7.499*   PO2 61*   PCO2 31.9*   HCO3 24.8   BE 2       Physical Exam:  Physical Exam   Constitutional: No distress.   Chronically ill, no distress  Poor cough   HENT:   Head: Normocephalic and atraumatic.   Right Ear: " External ear normal.   Left Ear: External ear normal.   Nose: Nose normal.   Eyes: Pupils are equal, round, and reactive to light. Conjunctivae and EOM are normal. Right eye exhibits no discharge. Left eye exhibits no discharge. No scleral icterus.   Neck: Normal range of motion. Neck supple. No JVD present. No tracheal deviation present. No thyromegaly present.   Cardiovascular: Normal rate, regular rhythm, normal heart sounds and intact distal pulses. Exam reveals no gallop and no friction rub.   No murmur heard.  Pulmonary/Chest: Breath sounds normal. No stridor. No respiratory distress. He has no wheezes. He has no rales. He exhibits no tenderness.   Shallow effort  Weak cough  No acc m use   Abdominal: Soft. Bowel sounds are normal. He exhibits no distension. There is no tenderness. There is no rebound and no guarding.   Musculoskeletal: Normal range of motion. He exhibits no edema, tenderness or deformity.   Lymphadenopathy:     He has no cervical adenopathy.   Neurological:   Doesn't respond to me  UE stiff  Decreased movement throughout   Skin: Skin is warm and dry. He is not diaphoretic.   Psychiatric:   calm   Nursing note and vitals reviewed.      Laboratory/Diagnostic Data:    Recent Labs   Lab 03/26/20  0804   WBC 5.50   HGB 11.0*   HCT 32.5*         K 3.6      CO2 24   BUN 13   CREATININE 0.5   AST 35   ALT 22   PROT 6.4   ALBUMIN 2.8*   BILITOT 0.5     Recent Labs     03/26/20  0314   PH 7.499*   PCO2 31.9*   PO2 61*   HCO3 24.8   POCSATURATED 93*   BE 2         Microbiology    Microbiology Results (last 7 days)     Procedure Component Value Units Date/Time    Blood culture x two cultures. Draw prior to antibiotics. [[id number]] Collected:  03/19/20 2005    Order Status:  Completed Specimen:  Blood from Peripheral, Antecubital, Right Updated:  03/24/20 2232     Blood Culture, Routine No growth after 5 days.    Narrative:       Aerobic and anaerobic    Blood culture x two cultures.  Draw prior to antibiotics. [[id number]] Collected:  03/19/20 1930    Order Status:  Completed Specimen:  Blood from Peripheral, Upper Arm, Right Updated:  03/24/20 2232     Blood Culture, Routine No growth after 5 days.    Narrative:       Aerobic and anaerobic    Culture, Respiratory with Gram Stain [[id number]] Collected:  03/22/20 1401    Order Status:  Completed Specimen:  Respiratory from Tracheal Aspirate Updated:  03/24/20 0713     Respiratory Culture Reduced normal respiratory indu     Gram Stain (Respiratory) <10 epithelial cells per low power field.     Gram Stain (Respiratory) Few WBC's     Gram Stain (Respiratory) Rare Gram positive cocci    Culture, Respiratory with Gram Stain [[id number]] Collected:  03/22/20 1401    Order Status:  Canceled Specimen:  Respiratory from Tracheal Aspirate     Strep A culture, throat [[id number]] Collected:  03/19/20 2000    Order Status:  Completed Specimen:  Throat Updated:  03/22/20 0843     Strep A Culture No significant growth      No  Group A  Streptococcus isolated    Urine culture [[id number]] Collected:  03/19/20 2103    Order Status:  Completed Specimen:  Urine, Clean Catch Updated:  03/22/20 0749     Urine Culture, Routine No growth    Narrative:       Indicated criteria for high risk culture:->Other  Other (specify):->possible sepsis    MRSA Screen by PCR [[id number]] Collected:  03/19/20 2210    Order Status:  Completed Specimen:  Nasopharyngeal Swab from Nasal Updated:  03/20/20 0033     MRSA SCREEN BY PCR Negative    Throat Screen, Rapid [[id number]] Collected:  03/19/20 2000    Order Status:  Completed Specimen:  Throat Updated:  03/19/20 2041     Rapid Strep A Screen Negative     Comment: See Micro for reflexed Strep culture.             Radiology    CXR(3/26) - no sign change    Medications:     chlorhexidine  15 mL Mouth/Throat BID    enoxparin  40 mg Subcutaneous Q24H    famotidine (PF)  20 mg Intravenous BID    mupirocin   Nasal BID     piperacillin-tazobactam (ZOSYN) IVPB  3.375 g Intravenous Q8H        TPN ADULT CENTRAL LINE CUSTOM (3 in 1) 75 mL/hr at 03/25/20 2155    TPN ADULT CENTRAL LINE CUSTOM (3 in 1)         acetaminophen, acetaminophen, albuterol-ipratropium, calcium chloride IVPB, calcium chloride IVPB, calcium chloride IVPB, dextrose 50%, dextrose 50%, hydrALAZINE, insulin regular, magnesium sulfate IVPB, magnesium sulfate IVPB, magnesium sulfate IVPB, magnesium sulfate IVPB, potassium chloride in water, potassium chloride in water, potassium chloride in water, potassium chloride in water, sodium chloride 0.9%, sodium phosphate IVPB, sodium phosphate IVPB, sodium phosphate IVPB, sodium phosphate IVPB, sodium phosphate IVPB, sodium phosphate IVPB    ASSESSMENT/PLAN:     Active Problems:    Active Hospital Problems    Diagnosis  POA    *Acute hypoxemic respiratory failure [J96.01]  Yes     Priority: 1 - High    Aspiration pneumonia [J69.0]  Yes     Priority: 2     Suspected Covid-19 Virus Infection [R68.89]  Yes     Priority: 3     Hypophosphatemia [E83.39]  No    Hypokalemia [E87.6]  No    Anemia [D64.9]  Yes    Pneumonia of both lungs due to infectious organism [J18.9]  Yes    Pharyngeal dysphagia [R13.13]  Yes    CVA (cerebral vascular accident) [I63.9]  Yes     Chronic    Gait instability [R26.81]  Yes    HTN (hypertension) [I10]  Yes      Resolved Hospital Problems   No resolved problems to display.     Plan    · Wean O2 as able  · Continue treatments  · NT suction as needed  · Will try to discuss with family - high risk that he will develop worsened respiratory status and will not clear secretions  · Covid test still pending  · Needs PEG tube or should be DNR  · Recommend stopping TPN    I discussed with Veronica Addsion (sister) about the current situation.  She states that Mr Addison had indicated that he did not want to be intubated.  After our discussion she has agreed to a DNI order but was not quite ready to make the  decision for DNR.  She wants to discuss with her brothers.  She understands that his condition will not improve and that he will continue to aspirate.  She is open to the idea of hospice as well.    I will continue to follow with the pt.  Please call me at [phone] if you have any questions.      You Taylor MD

## 2020-03-26 NOTE — PROGRESS NOTES
[facility]  Adult Nutrition   Progress Note (Nutrition Support Management)    SUMMARY     Recommendations  Recommendation/Intervention:   1. New TPN ordered to start at 17:00, run at 75ml/hr.   2. RD and NST to monitor and manage TPN daily.   3. Will recommend re-initiation of EN as medically able.   4. Recommend continued monitoring and management of blood glucose and electrolytes.    Goals:   1. Patient to tolerate PN. Nutrition provision to meet needs.   2. Blood glucose and electrolytes to trend towards normal limits/ target ranges.   Nutrition Goal Status: goal not met  Communication of RD Recs: discussed on rounds    Dietitian Rounds Brief  TPN to continue. New TPN ordered. Patient to remain NPO. Possible PEG placement in the future.     PARENTERAL NUTRITION PROGRESS NOTE:    Parenteral Nutrition Day # 3  Diagnosis/Indication for PN: aspiration pneumonia, unable to place NGT after several attempts, plans to hold PEG placement until more stable/at less risk due to complications related to COVID-19    IV Access: PICC  Diet/Tube Feeding: NPO         Admixture Type: 3-in-1 (10% AA, 70% Dextrose, 20% Intralipid)   Infusion Rate and Frequency: 75 ml/hr   Patient Acuity: ICU     PN Composition:   83 grams Amino Acid, 196 grams Dextrose, and 50 grams Lipid.    Today's TPN provides 1498 kcal.  *Dextrose is started at less than full dextrose goal to reduce risk for Refeeding Syndrome. Dextrose content will be advanced as appropriate over the next few days.    Please see order for electrolytes and additives content and adjustments.   *The Nutrition Support Team will continue to monitor electrolytes daily and adjust parenteral nutrition as warranted.    Reason for Assessment  Reason For Assessment: new TPN, RD follow-up  Interdisciplinary Rounds: attended    Nutrition Risk Screen  Nutrition Risk Screen: dysphagia or difficulty swallowing     MST Score: 2  Have you recently lost weight without trying?:  "Unsure  Weight loss score: 2  Have you been eating poorly because of a decreased appetite?: No(Unable to assess)  Appetite score: 0       Nutrition/Diet History  Spiritual, Cultural Beliefs, Quaker Practices, Values that Affect Care: other (see comments)(pt non verbal)  Food Allergies: NKFA  Factors Affecting Nutritional Intake: NPO, on mechanical ventilation, difficulty/impaired swallowing    Anthropometrics  Temp: 100 °F (37.8 °C)(nurse notified)  Height Method: Estimated  Height: 5' 5" (165.1 cm)  Height (inches): 65 in  Weight Method: Bed Scale  Weight: 69.1 kg (152 lb 5.4 oz)  Weight (lb): 152.34 lb  Ideal Body Weight (IBW), Male: 136 lb  % Ideal Body Weight, Male (lb): 109.1 %  BMI (Calculated): 25.4  BMI Grade: 25 - 29.9 - overweight       Weight History:  Wt Readings from Last 10 Encounters:   03/20/20 69.1 kg (152 lb 5.4 oz)   03/20/20 69.1 kg (152 lb 5.4 oz)   12/12/19 68 kg (150 lb)   09/13/16 81.6 kg (180 lb)       Lab/Procedures/Meds: Pertinent Labs Reviewed  Clinical Chemistry:  Recent Labs   Lab 03/19/20  1930  03/24/20  0410 03/25/20  0430 03/26/20  0804      < > 142 137 139   K 4.1   < > 3.6 3.2* 3.6      < > 111* 107 106   CO2 24   < > 22* 22* 24      < > 91 132* 127*   BUN 22   < > 10 13 13   CREATININE 0.7   < > 0.5 0.5 0.5   CALCIUM 9.1   < > 8.4* 8.0* 7.8*   PROT 7.3  --   --  6.4 6.4   ALBUMIN 3.9  --   --  2.8* 2.8*   BILITOT 0.6  --   --  0.7 0.5   ALKPHOS 70  --   --  40* 37*   AST 24  --   --  23 35   ALT 16  --   --  17 22   ANIONGAP 11   < > 9 8 9   ESTGFRAFRICA >60.0   < > >60.0 >60.0 >60.0   EGFRNONAA >60.0   < > >60.0 >60.0 >60.0   MG 2.1   < > 2.4 2.1 2.1   PHOS 3.3   < > 3.5 2.0* 3.0   LIPASE 35  --   --   --   --     < > = values in this interval not displayed.     CBC:   Recent Labs   Lab 03/26/20  0804   WBC 5.50   RBC 3.50*   HGB 11.0*   HCT 32.5*      MCV 93   MCH 31.4*   MCHC 33.8     Lipid Panel:  Recent Labs   Lab 03/25/20  0430   TRIG 130  130 "     Cardiac Profile:  Recent Labs   Lab 03/19/20  1930 03/22/20  0306   BNP 13  --    TROPONINI <0.030 <0.030     Inflammatory Labs:  Recent Labs   Lab 03/22/20  0306   CRP 7.64*     Medications: Pertinent Medications reviewed  Scheduled Meds:   aspirin  81 mg Per G Tube Daily    carbidopa-levodopa  mg  1 tablet Oral TID    chlorhexidine  15 mL Mouth/Throat BID    enoxparin  40 mg Subcutaneous Q24H    famotidine (PF)  20 mg Intravenous BID    lisinopriL  10 mg Oral QHS    mupirocin   Nasal BID    piperacillin-tazobactam (ZOSYN) IVPB  3.375 g Intravenous Q8H     Continuous Infusions:   TPN ADULT CENTRAL LINE CUSTOM (3 in 1) 75 mL/hr at 03/25/20 2155    TPN ADULT CENTRAL LINE CUSTOM (3 in 1)       PRN Meds:.acetaminophen, acetaminophen, albuterol-ipratropium, calcium chloride IVPB, calcium chloride IVPB, calcium chloride IVPB, dextrose 50%, dextrose 50%, hydrALAZINE, insulin regular, magnesium sulfate IVPB, magnesium sulfate IVPB, magnesium sulfate IVPB, magnesium sulfate IVPB, potassium chloride in water, potassium chloride in water, potassium chloride in water, potassium chloride in water, sodium chloride 0.9%, sodium phosphate IVPB, sodium phosphate IVPB, sodium phosphate IVPB, sodium phosphate IVPB, sodium phosphate IVPB, sodium phosphate IVPB    Estimated/Assessed Needs  Weight Used For Calorie Calculations: 69.1 kg (152 lb 5.4 oz)  Energy Calorie Requirements (kcal): 1746-5835 kcals/day (25-30 kcals/kg)  Energy Need Method: Kcal/kg  Protein Requirements: 69-90 g/day (1.0-1.3 g/kg)  Weight Used For Protein Calculations: 69.1 kg (152 lb 5.4 oz)     Estimated Fluid Requirement Method: RDA Method  RDA Method (mL): 1727       Nutrition Prescription Ordered  Current Diet Order: NPO     Evaluation of Received Nutrient/Fluid Intake  Energy Calories Required: not meeting needs  Protein Required: meeting needs  Fluid Required: meeting needs  Tolerance: tolerating     Intake/Output Summary (Last 24 hours)  at 3/26/2020 0921  Last data filed at 3/25/2020 2100  Gross per 24 hour   Intake 0 ml   Output --   Net 0 ml      Nutrition Risk  Level of Risk/Frequency of Follow-up: high     Monitor and Evaluation  Food and Nutrient Intake: energy intake, parenteral nutrition intake  Food and Nutrient Adminstration: enteral and parenteral nutrition administration  Physical Activity and Function: factors affecting access to physical activity, nutrition-related ADLs and IADLs  Anthropometric Measurements: body mass index, weight change, weight  Biochemical Data, Medical Tests and Procedures: electrolyte and renal panel, glucose/endocrine profile, lipid profile, gastrointestinal profile, inflammatory profile  Nutrition-Focused Physical Findings: overall appearance     Nutrition Follow-Up  RD Follow-up?: Yes    Alee Jaquez RD 03/26/2020 9:22 AM

## 2020-03-26 NOTE — PLAN OF CARE
03/25/20 2030   Patient Assessment/Suction   Level of Consciousness (AVPU) responds to voice   Respiratory Effort Unlabored   Expansion/Accessory Muscles/Retractions no use of accessory muscles   All Lung Fields Breath Sounds diminished   Rhythm/Pattern, Respiratory unlabored   Cough Frequency infrequent   Cough Type nonproductive   PRE-TX-O2   O2 Device (Oxygen Therapy) nasal cannula   Flow (L/min) 3   SpO2 96 %   Pulse 73   Resp 18   Aerosol Therapy   $ Aerosol Therapy Charges PRN treatment not required   Respiratory Treatment Status (SVN) PRN treatment not required        03/25/20 2030   Patient Assessment/Suction   Level of Consciousness (AVPU) responds to voice   Respiratory Effort Unlabored   Expansion/Accessory Muscles/Retractions no use of accessory muscles   All Lung Fields Breath Sounds diminished   Rhythm/Pattern, Respiratory unlabored   Cough Frequency infrequent   Cough Type nonproductive   PRE-TX-O2   O2 Device (Oxygen Therapy) nasal cannula   Flow (L/min) 3   SpO2 96 %   Pulse 73   Resp 18   Aerosol Therapy   $ Aerosol Therapy Charges PRN treatment not required   Respiratory Treatment Status (SVN) PRN treatment not required

## 2020-03-27 PROBLEM — E87.6 HYPOKALEMIA: Status: RESOLVED | Noted: 2020-03-22 | Resolved: 2020-03-27

## 2020-03-27 PROBLEM — E83.39 HYPOPHOSPHATEMIA: Status: RESOLVED | Noted: 2020-03-25 | Resolved: 2020-03-27

## 2020-03-27 PROBLEM — J96.01 ACUTE HYPOXEMIC RESPIRATORY FAILURE: Status: RESOLVED | Noted: 2020-03-22 | Resolved: 2020-03-27

## 2020-03-27 LAB
ALBUMIN SERPL BCP-MCNC: 2.8 G/DL (ref 3.5–5.2)
ALP SERPL-CCNC: 46 U/L (ref 55–135)
ALT SERPL W/O P-5'-P-CCNC: 38 U/L (ref 10–44)
ANION GAP SERPL CALC-SCNC: 9 MMOL/L (ref 8–16)
AST SERPL-CCNC: 47 U/L (ref 10–40)
BASOPHILS # BLD AUTO: 0.01 K/UL (ref 0–0.2)
BASOPHILS NFR BLD: 0.2 % (ref 0–1.9)
BILIRUB SERPL-MCNC: 0.5 MG/DL (ref 0.1–1)
BUN SERPL-MCNC: 17 MG/DL (ref 8–23)
CALCIUM SERPL-MCNC: 8.1 MG/DL (ref 8.7–10.5)
CHLORIDE SERPL-SCNC: 105 MMOL/L (ref 95–110)
CO2 SERPL-SCNC: 25 MMOL/L (ref 23–29)
CREAT SERPL-MCNC: 0.5 MG/DL (ref 0.5–1.4)
DIFFERENTIAL METHOD: ABNORMAL
EOSINOPHIL # BLD AUTO: 0.1 K/UL (ref 0–0.5)
EOSINOPHIL NFR BLD: 1.6 % (ref 0–8)
ERYTHROCYTE [DISTWIDTH] IN BLOOD BY AUTOMATED COUNT: 12 % (ref 11.5–14.5)
EST. GFR  (AFRICAN AMERICAN): >60 ML/MIN/1.73 M^2
EST. GFR  (NON AFRICAN AMERICAN): >60 ML/MIN/1.73 M^2
GLUCOSE SERPL-MCNC: 103 MG/DL (ref 70–110)
GLUCOSE SERPL-MCNC: 118 MG/DL (ref 70–110)
GLUCOSE SERPL-MCNC: 121 MG/DL (ref 70–110)
GLUCOSE SERPL-MCNC: 139 MG/DL (ref 70–110)
HCT VFR BLD AUTO: 30.7 % (ref 40–54)
HGB BLD-MCNC: 10.4 G/DL (ref 14–18)
IMM GRANULOCYTES # BLD AUTO: 0.04 K/UL (ref 0–0.04)
IMM GRANULOCYTES NFR BLD AUTO: 0.7 % (ref 0–0.5)
LYMPHOCYTES # BLD AUTO: 1 K/UL (ref 1–4.8)
LYMPHOCYTES NFR BLD: 18.4 % (ref 18–48)
MAGNESIUM SERPL-MCNC: 2.3 MG/DL (ref 1.6–2.6)
MCH RBC QN AUTO: 31.7 PG (ref 27–31)
MCHC RBC AUTO-ENTMCNC: 33.9 G/DL (ref 32–36)
MCV RBC AUTO: 94 FL (ref 82–98)
MONOCYTES # BLD AUTO: 0.5 K/UL (ref 0.3–1)
MONOCYTES NFR BLD: 8.7 % (ref 4–15)
NEUTROPHILS # BLD AUTO: 3.9 K/UL (ref 1.8–7.7)
NEUTROPHILS NFR BLD: 70.4 % (ref 38–73)
NRBC BLD-RTO: 0 /100 WBC
PHOSPHATE SERPL-MCNC: 2.7 MG/DL (ref 2.7–4.5)
PLATELET # BLD AUTO: 221 K/UL (ref 150–350)
PMV BLD AUTO: 10.6 FL (ref 9.2–12.9)
POTASSIUM SERPL-SCNC: 3.5 MMOL/L (ref 3.5–5.1)
PROT SERPL-MCNC: 6.7 G/DL (ref 6–8.4)
RBC # BLD AUTO: 3.28 M/UL (ref 4.6–6.2)
SODIUM SERPL-SCNC: 139 MMOL/L (ref 136–145)
WBC # BLD AUTO: 5.54 K/UL (ref 3.9–12.7)

## 2020-03-27 PROCEDURE — A4217 STERILE WATER/SALINE, 500 ML: HCPCS | Performed by: INTERNAL MEDICINE

## 2020-03-27 PROCEDURE — 94761 N-INVAS EAR/PLS OXIMETRY MLT: CPT

## 2020-03-27 PROCEDURE — 63600175 PHARM REV CODE 636 W HCPCS: Performed by: INTERNAL MEDICINE

## 2020-03-27 PROCEDURE — 21400001 HC TELEMETRY ROOM

## 2020-03-27 PROCEDURE — 99232 PR SUBSEQUENT HOSPITAL CARE,LEVL II: ICD-10-PCS | Mod: ,,, | Performed by: INTERNAL MEDICINE

## 2020-03-27 PROCEDURE — B4185 PARENTERAL SOL 10 GM LIPIDS: HCPCS | Performed by: INTERNAL MEDICINE

## 2020-03-27 PROCEDURE — 80053 COMPREHEN METABOLIC PANEL: CPT

## 2020-03-27 PROCEDURE — 99900035 HC TECH TIME PER 15 MIN (STAT)

## 2020-03-27 PROCEDURE — 82962 GLUCOSE BLOOD TEST: CPT

## 2020-03-27 PROCEDURE — 36415 COLL VENOUS BLD VENIPUNCTURE: CPT

## 2020-03-27 PROCEDURE — 31720 CLEARANCE OF AIRWAYS: CPT

## 2020-03-27 PROCEDURE — 85025 COMPLETE CBC W/AUTO DIFF WBC: CPT

## 2020-03-27 PROCEDURE — S0028 INJECTION, FAMOTIDINE, 20 MG: HCPCS | Performed by: INTERNAL MEDICINE

## 2020-03-27 PROCEDURE — 99232 SBSQ HOSP IP/OBS MODERATE 35: CPT | Mod: ,,, | Performed by: INTERNAL MEDICINE

## 2020-03-27 PROCEDURE — 83735 ASSAY OF MAGNESIUM: CPT

## 2020-03-27 PROCEDURE — 25000003 PHARM REV CODE 250: Performed by: INTERNAL MEDICINE

## 2020-03-27 PROCEDURE — 84100 ASSAY OF PHOSPHORUS: CPT

## 2020-03-27 RX ADMIN — FAMOTIDINE 20 MG: 10 INJECTION INTRAVENOUS at 09:03

## 2020-03-27 RX ADMIN — POTASSIUM CHLORIDE: 2 INJECTION, SOLUTION, CONCENTRATE INTRAVENOUS at 05:03

## 2020-03-27 RX ADMIN — CHLORHEXIDINE GLUCONATE 15 ML: 1.2 RINSE ORAL at 09:03

## 2020-03-27 RX ADMIN — ENOXAPARIN SODIUM 40 MG: 100 INJECTION SUBCUTANEOUS at 01:03

## 2020-03-27 NOTE — PLAN OF CARE
03/27/20 1222   Discharge Assessment   Assessment Type Discharge Planning Reassessment     Consult for Hospice ordered, spoke with pt's sister Mrs Veronica Addison at cell [phone], she spoke with family members and family in agreement to have Hospice to talk with her.     Spoke with patient's sister Mrs Martin on her cell as above about Freedom of Choice Form for Hospice Care, explained to her that they have the right to choose any agency, and a list of agencies was provided to patient's sister to review, she verbalized an understanding, and ok with first avail Hospice to call her and discuss Hospice availability, explained to Mrs Martin for phone consent and she in agreement, CM signed form and form scanned into CM notes.    Built and sent referral to [facility] at fax [phone] and called the office at [phone] and left a VM that referral sent, left this CM callback number [phone] to let know they got referral and who calling family. CM will follow for dc planning needs.    Few mins later called Mrs De Anda cell [phone] that referral sent for Hospice Care, she will be at [facility] in little while to assess other pt and will come review this pt. Meanwhile Mrs Willard called from [facility] Office and left  to call her, called her back and got her VM. Will continue to follow for DC Planning Needs.    1300 pm: Neetu with [facility] on 3000 floor assessing pt and speaking with Dr Mcghee about Hospice care. CM will follow for dc planning needs.    1515 pm: Spoke with Mrs De Anda and Mrs Estrada osvaldo avendaño and they said that their office told them that  would be able to accept pt for Hospice at . Sent Miya an email that DC orders referral sent and few mins later she called this CM back that they are UNABLE to accept pt since COVID test not back as negative. Updated Dr Mcghee and Mrs De Anda with [facility], so pt not going to  today. CM will continue to follow for DC planning  Needs.

## 2020-03-27 NOTE — PROGRESS NOTES
"Progress Note  Pulmonary/Critical Care      Admit Date: 3/19/2020    SUBJECTIVE:     HPI/Interval history (See H&P for complete P,F,SHx) :     3/23/2020 - Pt stable overnight, looks better this A and O2 has been decreased.  Has not had much in the way of secretions.  We will plan to try a SBT and see how he does (hopefully we can extubate).  HGB has decreased but no active bleeding reported.     3/24/2020 - Stable overnight, proceeded with SBT with good parameters and was able to be extubated.  No new issues reported.  Not very awake when I saw him.  Sats 98% on NC O2 and in no distress.     3/25/2020 - Stable extubated, a little tachypneic but no distress and to move to floor.  I am told that family does not want a PEG but pt clearly aspirates.  He has been started on TPN.  He is nonverbal    3/26/2020 - Stable overnight, transferred to floor and in no distress.  Eyes are open but he doesn't talk to me.  Cough mechanics are not good.  He did have problems with clearing secretions this AM (see Dr Cleveland's notes)    3/27/2020 - Stable overnight, he opens eyes but doesn't speak.  No new respiratory issues but remains at high risk for aspiration, poor clearing of secretions.  Family is planning to meet with hospice (I agree).    Review of Systems: List if applicable    Review of Systems   Unable to perform ROS: Mental acuity       OBJECTIVE:     Vital Signs Range (Last 24H):  Temp:  [97.9 °F (36.6 °C)-99.2 °F (37.3 °C)]   Pulse:  [57-67]   Resp:  [18-26]   BP: (118-149)/(64-74)   SpO2:  [94 %-99 %]     I & O (Last 24H):    Intake/Output Summary (Last 24 hours) at 3/27/2020 1441  Last data filed at 3/27/2020 1201  Gross per 24 hour   Intake 2406.25 ml   Output 2700 ml   Net -293.75 ml       Estimated body mass index is 25.75 kg/m² as calculated from the following:    Height as of 3/20/20: 5' 5" (1.651 m).    Weight as of this encounter: 70.2 kg (154 lb 12.2 oz).    Vent Settings-      [facility]  Recent Labs   Lab 03/26/20  0314 "   PH 7.499*   PO2 61*   PCO2 31.9*   HCO3 24.8   BE 2       Physical Exam:  Physical Exam   Constitutional: No distress.   Chronically ill, no distress  Poor cough   HENT:   Head: Normocephalic and atraumatic.   Right Ear: External ear normal.   Left Ear: External ear normal.   Nose: Nose normal.   Eyes: Pupils are equal, round, and reactive to light. Conjunctivae and EOM are normal. Right eye exhibits no discharge. Left eye exhibits no discharge. No scleral icterus.   Neck: Normal range of motion. Neck supple. No JVD present. No tracheal deviation present. No thyromegaly present.   Cardiovascular: Normal rate, regular rhythm, normal heart sounds and intact distal pulses. Exam reveals no gallop and no friction rub.   No murmur heard.  Pulmonary/Chest: Breath sounds normal. No stridor. No respiratory distress. He has no wheezes. He has no rales. He exhibits no tenderness.   Shallow effort  Weak cough  No acc m use   Abdominal: Soft. Bowel sounds are normal. He exhibits no distension. There is no tenderness. There is no rebound and no guarding.   Musculoskeletal: Normal range of motion. He exhibits no edema, tenderness or deformity.   Lymphadenopathy:     He has no cervical adenopathy.   Neurological:   Doesn't respond to me  UE stiff  Decreased movement throughout   Skin: Skin is warm and dry. He is not diaphoretic.   Psychiatric:   calm   Nursing note and vitals reviewed.      Laboratory/Diagnostic Data:    Recent Labs   Lab 03/27/20  0411   WBC 5.54   HGB 10.4*   HCT 30.7*         K 3.5      CO2 25   BUN 17   CREATININE 0.5   AST 47*   ALT 38   PROT 6.7   ALBUMIN 2.8*   BILITOT 0.5     Recent Labs     03/26/20  0314   PH 7.499*   PCO2 31.9*   PO2 61*   HCO3 24.8   POCSATURATED 93*   BE 2         Microbiology    Microbiology Results (last 7 days)     Procedure Component Value Units Date/Time    Blood culture x two cultures. Draw prior to antibiotics. [[id number]] Collected:  03/19/20 2005    Order  Status:  Completed Specimen:  Blood from Peripheral, Antecubital, Right Updated:  03/24/20 2232     Blood Culture, Routine No growth after 5 days.    Narrative:       Aerobic and anaerobic    Blood culture x two cultures. Draw prior to antibiotics. [[id number]] Collected:  03/19/20 1930    Order Status:  Completed Specimen:  Blood from Peripheral, Upper Arm, Right Updated:  03/24/20 2232     Blood Culture, Routine No growth after 5 days.    Narrative:       Aerobic and anaerobic    Culture, Respiratory with Gram Stain [[id number]] Collected:  03/22/20 1401    Order Status:  Completed Specimen:  Respiratory from Tracheal Aspirate Updated:  03/24/20 0713     Respiratory Culture Reduced normal respiratory indu     Gram Stain (Respiratory) <10 epithelial cells per low power field.     Gram Stain (Respiratory) Few WBC's     Gram Stain (Respiratory) Rare Gram positive cocci    Culture, Respiratory with Gram Stain [[id number]] Collected:  03/22/20 1401    Order Status:  Canceled Specimen:  Respiratory from Tracheal Aspirate     Strep A culture, throat [[id number]] Collected:  03/19/20 2000    Order Status:  Completed Specimen:  Throat Updated:  03/22/20 0843     Strep A Culture No significant growth      No  Group A  Streptococcus isolated    Urine culture [[id number]] Collected:  03/19/20 2103    Order Status:  Completed Specimen:  Urine, Clean Catch Updated:  03/22/20 0749     Urine Culture, Routine No growth    Narrative:       Indicated criteria for high risk culture:->Other  Other (specify):->possible sepsis          Radiology    CXR(3/26) - no sign change    Medications:     chlorhexidine  15 mL Mouth/Throat BID    enoxparin  40 mg Subcutaneous Q24H    famotidine (PF)  20 mg Intravenous BID        TPN ADULT CENTRAL LINE CUSTOM (3 in 1) 75 mL/hr at 03/26/20 1808    TPN ADULT CENTRAL LINE CUSTOM (3 in 1)         acetaminophen, acetaminophen, albuterol, calcium chloride IVPB, calcium chloride IVPB, calcium chloride  IVPB, dextrose 50%, dextrose 50%, hydrALAZINE, insulin regular, magnesium sulfate IVPB, magnesium sulfate IVPB, magnesium sulfate IVPB, magnesium sulfate IVPB, morphine, potassium chloride in water, potassium chloride in water, potassium chloride in water, potassium chloride in water, sodium chloride 0.9%, sodium phosphate IVPB, sodium phosphate IVPB, sodium phosphate IVPB, sodium phosphate IVPB, sodium phosphate IVPB, sodium phosphate IVPB    ASSESSMENT/PLAN:     Active Problems:    Active Hospital Problems    Diagnosis  POA    *Acute hypoxemic respiratory failure [J96.01]  Yes     Priority: 1 - High    Aspiration pneumonia [J69.0]  Yes     Priority: 2     Suspected Covid-19 Virus Infection [R68.89]  Yes     Priority: 3     Hypophosphatemia [E83.39]  No    Hypokalemia [E87.6]  No    Anemia [D64.9]  Yes    Pneumonia of both lungs due to infectious organism [J18.9]  Yes    Pharyngeal dysphagia [R13.13]  Yes    CVA (cerebral vascular accident) [I63.9]  Yes     Chronic    Gait instability [R26.81]  Yes    HTN (hypertension) [I10]  Yes      Resolved Hospital Problems   No resolved problems to display.     Plan    · Wean O2 as able  · Continue treatments  · NT suction as needed  · Agree with plans for hospice evaluation    Respiratory status is stable, I will sign off please reconsult if I can be of further help.      I will continue to follow with the pt.  Please call me at [phone] if you have any questions.      You Taylor MD

## 2020-03-27 NOTE — PROGRESS NOTES
"[facility]  Adult Nutrition   Progress Note (Nutrition Support Management)    SUMMARY     Recommendations  Recommendation/Intervention: 1) New TPN ordered at 75ml/hr. RD will continue to monitor and manage TPN.    Goals: Patient will receive some form of nutrition within 24hrs.  Nutrition Goal Status: Met  Communication of RD Recs: discussed on rounds    Dietitian Rounds Brief  TPN to continue until PEG is placed. New TPN ordered. Patient to remains NPO.    Reason for Assessment  Reason For Assessment: RD follow-up, new TPN    Nutrition Risk Screen  Nutrition Risk Screen: dysphagia or difficulty swallowing, tube feeding or parenteral nutrition     MST Score: 2  Have you recently lost weight without trying?: Unsure  Weight loss score: 2  Have you been eating poorly because of a decreased appetite?: No(Unable to assess)  Appetite score: 0       Nutrition/Diet History  Spiritual, Cultural Beliefs, Temple Practices, Values that Affect Care: other (see comments)(pt non verbal)  Food Allergies: NKFA  Factors Affecting Nutritional Intake: NPO, difficulty/impaired swallowing    Anthropometrics  Temp: 98 °F (36.7 °C)  Height Method: Estimated  Height: 5' 5" (165.1 cm)  Height (inches): 65 in  Weight Method: Bed Scale  Weight: 70.2 kg (154 lb 12.2 oz)  Weight (lb): 154.76 lb  Ideal Body Weight (IBW), Male: 136 lb  % Ideal Body Weight, Male (lb): 109.1 %  BMI (Calculated): 25.8  BMI Grade: 25 - 29.9 - overweight       Weight History:  Wt Readings from Last 10 Encounters:   03/27/20 70.2 kg (154 lb 12.2 oz)   03/20/20 69.1 kg (152 lb 5.4 oz)   12/12/19 68 kg (150 lb)   09/13/16 81.6 kg (180 lb)       Lab/Procedures/Meds: Pertinent Labs Reviewed  Clinical Chemistry:  Recent Labs   Lab 03/25/20  0430 03/26/20  0804 03/27/20  0411    139 139   K 3.2* 3.6 3.5    106 105   CO2 22* 24 25   * 127* 118*   BUN 13 13 17   CREATININE 0.5 0.5 0.5   CALCIUM 8.0* 7.8* 8.1*   PROT 6.4 6.4 6.7   ALBUMIN " 2.8* 2.8* 2.8*   BILITOT 0.7 0.5 0.5   ALKPHOS 40* 37* 46*   AST 23 35 47*   ALT 17 22 38   ANIONGAP 8 9 9   ESTGFRAFRICA >60.0 >60.0 >60.0   EGFRNONAA >60.0 >60.0 >60.0   MG 2.1 2.1 2.3   PHOS 2.0* 3.0 2.7     CBC:   Recent Labs   Lab 03/27/20  0411   WBC 5.54   RBC 3.28*   HGB 10.4*   HCT 30.7*      MCV 94   MCH 31.7*   MCHC 33.9     Lipid Panel:  Recent Labs   Lab 03/25/20  0430   TRIG 130  130     Cardiac Profile:  Recent Labs   Lab 03/22/20  0306   TROPONINI <0.030     Inflammatory Labs:  Recent Labs   Lab 03/22/20  0306   CRP 7.64*     Medications: Pertinent Medications reviewed  Scheduled Meds:   chlorhexidine  15 mL Mouth/Throat BID    enoxparin  40 mg Subcutaneous Q24H    famotidine (PF)  20 mg Intravenous BID     Continuous Infusions:   TPN ADULT CENTRAL LINE CUSTOM (3 in 1) 75 mL/hr at 03/26/20 1808    TPN ADULT CENTRAL LINE CUSTOM (3 in 1)       Estimated/Assessed Needs  Weight Used For Calorie Calculations: 69.1 kg (152 lb 5.4 oz)  Energy Calorie Requirements (kcal): 8750-5882 kcals/day (25-30 kcals/kg)  Energy Need Method: Kcal/kg  Protein Requirements: 69-90 g/day (1.0-1.3 g/kg)  Weight Used For Protein Calculations: 69.1 kg (152 lb 5.4 oz)     Estimated Fluid Requirement Method: RDA Method  RDA Method (mL): 1727     Nutrition Prescription Ordered  Current Diet Order: NPO    Evaluation of Received Nutrient/Fluid Intake  Other Calories (kcal): 0  Energy Calories Required: meeting needs  Protein Required: meeting needs  Fluid Required: meeting needs  Tolerance: tolerating     Intake/Output Summary (Last 24 hours) at 3/27/2020 1103  Last data filed at 3/27/2020 0600  Gross per 24 hour   Intake 2406.25 ml   Output 2300 ml   Net 106.25 ml      % Intake of Estimated Energy Needs: 75 - 100 %  % Meal Intake: NPO    Nutrition Risk  Level of Risk/Frequency of Follow-up: high     Monitor and Evaluation  Food and Nutrient Intake: energy intake, parenteral nutrition intake  Food and Nutrient  Adminstration: enteral and parenteral nutrition administration  Physical Activity and Function: factors affecting access to physical activity, nutrition-related ADLs and IADLs  Anthropometric Measurements: body mass index, weight change, weight  Biochemical Data, Medical Tests and Procedures: electrolyte and renal panel, glucose/endocrine profile, lipid profile, gastrointestinal profile, inflammatory profile  Nutrition-Focused Physical Findings: overall appearance     Nutrition Follow-Up  RD Follow-up?: Yes

## 2020-03-27 NOTE — PROGRESS NOTES
[facility]  Progress Note    Patient Name: Mukesh Addison  MRN: [id number]  Patient Class: IP- Inpatient   Admission Date: 3/19/2020  Length of Stay: 7 days  Attending Physician: Elie Mcghee MD  Primary Care Provider: Primary Doctor No        Subjective:     Principal Problem:Acute hypoxemic respiratory failure      Interval History:  No acute overnight events reported. Pulmonology discussed plan of care with sister Veronica and decision has been made to transition to hospice at the time of discharge. Awaiting COVID-19 result.    Review of Systems   Unable to perform ROS: Patient nonverbal     Objective:     Vital Signs (Most Recent):  Temp: 98.2 °F (36.8 °C) (03/27/20 1538)  Pulse: 63 (03/27/20 1538)  Resp: (!) 23 (03/27/20 1538)  BP: (!) 117/56 (03/27/20 1538)  SpO2: 98 % (03/27/20 1538) Vital Signs (24h Range):  Temp:  [97.9 °F (36.6 °C)-98.7 °F (37.1 °C)] 98.2 °F (36.8 °C)  Pulse:  [57-67] 63  Resp:  [18-26] 23  SpO2:  [95 %-99 %] 98 %  BP: (117-149)/(56-74) 117/56     Weight: 70.2 kg (154 lb 12.2 oz)  Body mass index is 25.75 kg/m².    Intake/Output Summary (Last 24 hours) at 3/27/2020 1809  Last data filed at 3/27/2020 1610  Gross per 24 hour   Intake 2406.25 ml   Output 1650 ml   Net 756.25 ml      Physical Exam   Constitutional:   Frail  male in no acute distress   HENT:   Head: Normocephalic and atraumatic.   Eyes: Conjunctivae are normal. No scleral icterus.   Neck: Neck supple. No thyromegaly present.   Cardiovascular: Normal rate, regular rhythm, normal heart sounds and intact distal pulses.   Pulmonary/Chest:   Scattered coarse rhonchi (R>L)   Abdominal: Soft. Bowel sounds are normal. He exhibits no distension.   Musculoskeletal: He exhibits deformity. He exhibits no edema.   Mild contractures to upper and lower extremities   Neurological: He is alert. He displays no atrophy.   Non-focal    Skin: Skin is warm and dry. Capillary refill takes less than 2 seconds. He  is not diaphoretic.   Psychiatric: He has a normal mood and affect. His behavior is normal.   Nursing note and vitals reviewed.      Significant Labs:   CBC:   Recent Labs   Lab 03/26/20  0314 03/26/20  0804 03/27/20 0411   WBC  --  5.50 5.54   HGB  --  11.0* 10.4*   HCT 30* 32.5* 30.7*   PLT  --  188 221     CMP:   Recent Labs   Lab 03/26/20  0804 03/27/20 0411    139   K 3.6 3.5    105   CO2 24 25   * 118*   BUN 13 17   CREATININE 0.5 0.5   CALCIUM 7.8* 8.1*   PROT 6.4 6.7   ALBUMIN 2.8* 2.8*   BILITOT 0.5 0.5   ALKPHOS 37* 46*   AST 35 47*   ALT 22 38   ANIONGAP 9 9   EGFRNONAA >60.0 >60.0         Microbiology Results (last 7 days)     Procedure Component Value Units Date/Time    Blood culture x two cultures. Draw prior to antibiotics. [[id number]] Collected:  03/19/20 2005    Order Status:  Completed Specimen:  Blood from Peripheral, Antecubital, Right Updated:  03/24/20 2232     Blood Culture, Routine No growth after 5 days.    Narrative:       Aerobic and anaerobic    Blood culture x two cultures. Draw prior to antibiotics. [[id number]] Collected:  03/19/20 1930    Order Status:  Completed Specimen:  Blood from Peripheral, Upper Arm, Right Updated:  03/24/20 2232     Blood Culture, Routine No growth after 5 days.    Narrative:       Aerobic and anaerobic    Culture, Respiratory with Gram Stain [[id number]] Collected:  03/22/20 1401    Order Status:  Completed Specimen:  Respiratory from Tracheal Aspirate Updated:  03/24/20 0713     Respiratory Culture Reduced normal respiratory indu     Gram Stain (Respiratory) <10 epithelial cells per low power field.     Gram Stain (Respiratory) Few WBC's     Gram Stain (Respiratory) Rare Gram positive cocci    Culture, Respiratory with Gram Stain [[id number]] Collected:  03/22/20 1401    Order Status:  Canceled Specimen:  Respiratory from Tracheal Aspirate     Strep A culture, throat [[id number]] Collected:  03/19/20 2000    Order Status:  Completed  Specimen:  Throat Updated:  03/22/20 0843     Strep A Culture No significant growth      No  Group A  Streptococcus isolated    Urine culture [[id number]] Collected:  03/19/20 2103    Order Status:  Completed Specimen:  Urine, Clean Catch Updated:  03/22/20 0749     Urine Culture, Routine No growth    Narrative:       Indicated criteria for high risk culture:->Other  Other (specify):->possible sepsis            Assessment/Plan:      Active Hospital Problems    Diagnosis  POA    Suspected Covid-19 Virus Infection [R68.89]  Yes     Priority: 2     Anemia [D64.9]  Yes    Pneumonia of both lungs due to infectious organism [J18.9]  Yes    Pharyngeal dysphagia [R13.13]  Yes    CVA (cerebral vascular accident) [I63.9]  Yes     Chronic    Gait instability [R26.81]  Yes    Aspiration pneumonia [J69.0]  Yes    HTN (hypertension) [I10]  Yes      Resolved Hospital Problems    Diagnosis Date Resolved POA    *Acute hypoxemic respiratory failure [J96.01] 03/27/2020 Yes    Hypophosphatemia [E83.39] 03/27/2020 No    Hypokalemia [E87.6] 03/27/2020 No       Plan:  S/p treatment of aspiration pneumonia with piperacillin-tazobactam.  Suspected COVID-19; PCR is currently pending  Recent MBSS from 12/2019 with severe pharyngeal dysphagia  Family not ready for PEG tube; will discontinue TPN  Strict NPO  Air bone and droplet isolation precautions for suspected COVID-19    Dispo:  Plan is to go to [facility] with hospice at the time of discharge; however he can only be accepted after COVID-19 testing results as they don't have isolation rooms currently       VTE Risk Mitigation (From admission, onward)         Ordered     enoxaparin injection 40 mg  Every 24 hours (non-standard times)      03/20/20 1439     IP VTE LOW RISK PATIENT  Once      03/20/20 0247     Place sequential compression device  Until discontinued      03/20/20 0247                      Elie Mcghee MD  Department of Hospital Medicine   [facility]  [facility]

## 2020-03-27 NOTE — PLAN OF CARE
Problem: Parenteral Nutrition  Goal: Effective Intravenous Nutrition Therapy Delivery  Outcome: Ongoing, Progressing   New TPN ordered. RD will continue to monitor.

## 2020-03-28 LAB
ALBUMIN SERPL BCP-MCNC: 2.6 G/DL (ref 3.5–5.2)
ALP SERPL-CCNC: 66 U/L (ref 55–135)
ALT SERPL W/O P-5'-P-CCNC: 85 U/L (ref 10–44)
ANION GAP SERPL CALC-SCNC: 6 MMOL/L (ref 8–16)
AST SERPL-CCNC: 91 U/L (ref 10–40)
BASOPHILS # BLD AUTO: 0.01 K/UL (ref 0–0.2)
BASOPHILS NFR BLD: 0.2 % (ref 0–1.9)
BILIRUB SERPL-MCNC: 0.5 MG/DL (ref 0.1–1)
BUN SERPL-MCNC: 17 MG/DL (ref 8–23)
CALCIUM SERPL-MCNC: 8.3 MG/DL (ref 8.7–10.5)
CHLORIDE SERPL-SCNC: 107 MMOL/L (ref 95–110)
CO2 SERPL-SCNC: 25 MMOL/L (ref 23–29)
CREAT SERPL-MCNC: 0.4 MG/DL (ref 0.5–1.4)
DIFFERENTIAL METHOD: ABNORMAL
EOSINOPHIL # BLD AUTO: 0.1 K/UL (ref 0–0.5)
EOSINOPHIL NFR BLD: 2.1 % (ref 0–8)
ERYTHROCYTE [DISTWIDTH] IN BLOOD BY AUTOMATED COUNT: 12.1 % (ref 11.5–14.5)
EST. GFR  (AFRICAN AMERICAN): >60 ML/MIN/1.73 M^2
EST. GFR  (NON AFRICAN AMERICAN): >60 ML/MIN/1.73 M^2
GLUCOSE SERPL-MCNC: 101 MG/DL (ref 70–110)
GLUCOSE SERPL-MCNC: 104 MG/DL (ref 70–110)
GLUCOSE SERPL-MCNC: 116 MG/DL (ref 70–110)
GLUCOSE SERPL-MCNC: 136 MG/DL (ref 70–110)
GLUCOSE SERPL-MCNC: 79 MG/DL (ref 70–110)
HCT VFR BLD AUTO: 29.8 % (ref 40–54)
HGB BLD-MCNC: 9.9 G/DL (ref 14–18)
IMM GRANULOCYTES # BLD AUTO: 0.03 K/UL (ref 0–0.04)
IMM GRANULOCYTES NFR BLD AUTO: 0.5 % (ref 0–0.5)
LYMPHOCYTES # BLD AUTO: 1.1 K/UL (ref 1–4.8)
LYMPHOCYTES NFR BLD: 18.4 % (ref 18–48)
MAGNESIUM SERPL-MCNC: 2.5 MG/DL (ref 1.6–2.6)
MCH RBC QN AUTO: 31.6 PG (ref 27–31)
MCHC RBC AUTO-ENTMCNC: 33.2 G/DL (ref 32–36)
MCV RBC AUTO: 95 FL (ref 82–98)
MONOCYTES # BLD AUTO: 0.6 K/UL (ref 0.3–1)
MONOCYTES NFR BLD: 9 % (ref 4–15)
NEUTROPHILS # BLD AUTO: 4.2 K/UL (ref 1.8–7.7)
NEUTROPHILS NFR BLD: 69.8 % (ref 38–73)
NRBC BLD-RTO: 0 /100 WBC
PHOSPHATE SERPL-MCNC: 3.5 MG/DL (ref 2.7–4.5)
PLATELET # BLD AUTO: 237 K/UL (ref 150–350)
PMV BLD AUTO: 10 FL (ref 9.2–12.9)
POTASSIUM SERPL-SCNC: 4.1 MMOL/L (ref 3.5–5.1)
PROT SERPL-MCNC: 6.8 G/DL (ref 6–8.4)
RBC # BLD AUTO: 3.13 M/UL (ref 4.6–6.2)
SODIUM SERPL-SCNC: 138 MMOL/L (ref 136–145)
WBC # BLD AUTO: 6.08 K/UL (ref 3.9–12.7)

## 2020-03-28 PROCEDURE — S0028 INJECTION, FAMOTIDINE, 20 MG: HCPCS | Performed by: INTERNAL MEDICINE

## 2020-03-28 PROCEDURE — 25000003 PHARM REV CODE 250: Performed by: INTERNAL MEDICINE

## 2020-03-28 PROCEDURE — 85025 COMPLETE CBC W/AUTO DIFF WBC: CPT

## 2020-03-28 PROCEDURE — 99900035 HC TECH TIME PER 15 MIN (STAT)

## 2020-03-28 PROCEDURE — 21400001 HC TELEMETRY ROOM

## 2020-03-28 PROCEDURE — 83735 ASSAY OF MAGNESIUM: CPT

## 2020-03-28 PROCEDURE — 84100 ASSAY OF PHOSPHORUS: CPT

## 2020-03-28 PROCEDURE — 27000221 HC OXYGEN, UP TO 24 HOURS

## 2020-03-28 PROCEDURE — 63600175 PHARM REV CODE 636 W HCPCS: Performed by: INTERNAL MEDICINE

## 2020-03-28 PROCEDURE — 80053 COMPREHEN METABOLIC PANEL: CPT

## 2020-03-28 PROCEDURE — 82962 GLUCOSE BLOOD TEST: CPT

## 2020-03-28 PROCEDURE — 94761 N-INVAS EAR/PLS OXIMETRY MLT: CPT

## 2020-03-28 RX ORDER — SODIUM CHLORIDE 9 MG/ML
INJECTION, SOLUTION INTRAVENOUS CONTINUOUS
Status: DISCONTINUED | OUTPATIENT
Start: 2020-03-28 | End: 2020-03-29

## 2020-03-28 RX ADMIN — ENOXAPARIN SODIUM 40 MG: 100 INJECTION SUBCUTANEOUS at 02:03

## 2020-03-28 RX ADMIN — FAMOTIDINE 20 MG: 10 INJECTION INTRAVENOUS at 08:03

## 2020-03-28 RX ADMIN — FAMOTIDINE 20 MG: 10 INJECTION INTRAVENOUS at 12:03

## 2020-03-28 RX ADMIN — CHLORHEXIDINE GLUCONATE 15 ML: 1.2 RINSE ORAL at 08:03

## 2020-03-28 RX ADMIN — SODIUM CHLORIDE: 0.9 INJECTION, SOLUTION INTRAVENOUS at 06:03

## 2020-03-28 RX ADMIN — CHLORHEXIDINE GLUCONATE 15 ML: 1.2 RINSE ORAL at 12:03

## 2020-03-28 NOTE — PLAN OF CARE
03/28/20 1159   Discharge Reassessment   Assessment Type Discharge Planning Reassessment   Anticipated Discharge Disposition [facility]     Weekend SW consulted to see if Pt can discharge to [place] with [facility] this date.  COVID-19 test still pending, and ZULLY advised MD that facility will not accept until results are back and negative.  Will continue to follow.

## 2020-03-28 NOTE — PLAN OF CARE
03/28/20 0756   Patient Assessment/Suction   Level of Consciousness (AVPU) alert   Respiratory Effort Normal;Unlabored   Expansion/Accessory Muscles/Retractions no use of accessory muscles;no retractions;expansion symmetric   All Lung Fields Breath Sounds clear;diminished   Rhythm/Pattern, Respiratory unlabored;pattern regular;depth regular   Cough Frequency infrequent   Cough Type good   PRE-TX-O2   O2 Device (Oxygen Therapy) nasal cannula   $ Is the patient on Low Flow Oxygen? Yes   Flow (L/min) 3   SpO2 98 %   Pulse Oximetry Type Intermittent   $ Pulse Oximetry - Multiple Charge Pulse Oximetry - Multiple   Pulse 66   Resp 20   Inhaler   $ Inhaler Charges PRN treatment not required

## 2020-03-28 NOTE — PROGRESS NOTES
[facility] Medicine  Progress Note    Patient Name: Mukesh Addison  MRN: [id number]  Patient Class: IP- Inpatient   Admission Date: 3/19/2020  Length of Stay: 8 days  Attending Physician: Elie Mcghee MD  Primary Care Provider: Primary Doctor No        Subjective:     Principal Problem:Acute hypoxemic respiratory failure      Interval History:  No overnight events reported. Plan to d/c to nursing home with hospice. Awaiting COVID-19 result.    Review of Systems   Unable to perform ROS: Patient nonverbal     Objective:     Vital Signs (Most Recent):  Temp: 98.6 °F (37 °C) (03/28/20 1126)  Pulse: (!) 56 (03/28/20 1126)  Resp: 18 (03/28/20 1126)  BP: 131/63 (03/28/20 1126)  SpO2: 97 % (03/28/20 1126) Vital Signs (24h Range):  Temp:  [98.5 °F (36.9 °C)-99 °F (37.2 °C)] 98.6 °F (37 °C)  Pulse:  [56-66] 56  Resp:  [18-26] 18  SpO2:  [96 %-99 %] 97 %  BP: (118-134)/(63-79) 131/63     Weight: 70.2 kg (154 lb 12.2 oz)  Body mass index is 25.75 kg/m².    Intake/Output Summary (Last 24 hours) at 3/28/2020 1547  Last data filed at 3/28/2020 0549  Gross per 24 hour   Intake --   Output 1050 ml   Net -1050 ml      Physical Exam   Constitutional:   Frail  male in no acute distress   HENT:   Head: Normocephalic and atraumatic.   Eyes: Conjunctivae are normal. No scleral icterus.   Neck: Neck supple. No thyromegaly present.   Cardiovascular: Normal rate, regular rhythm, normal heart sounds and intact distal pulses.   Pulmonary/Chest:   Scattered coarse rhonchi (R>L)   Abdominal: Soft. Bowel sounds are normal. He exhibits no distension.   Musculoskeletal: He exhibits deformity. He exhibits no edema.   Mild contractures to upper and lower extremities   Neurological: He is alert. He displays no atrophy.   Non-focal    Skin: Skin is warm and dry. Capillary refill takes less than 2 seconds. He is not diaphoretic.   Nursing note and vitals reviewed.      Significant Labs:   CBC:   Recent Labs   Lab  03/27/20  0411 03/28/20  0406   WBC 5.54 6.08   HGB 10.4* 9.9*   HCT 30.7* 29.8*    237     CMP:   Recent Labs   Lab 03/27/20  0411 03/28/20  0406    138   K 3.5 4.1    107   CO2 25 25   * 116*   BUN 17 17   CREATININE 0.5 0.4*   CALCIUM 8.1* 8.3*   PROT 6.7 6.8   ALBUMIN 2.8* 2.6*   BILITOT 0.5 0.5   ALKPHOS 46* 66   AST 47* 91*   ALT 38 85*   ANIONGAP 9 6*   EGFRNONAA >60.0 >60.0         Microbiology Results (last 7 days)     Procedure Component Value Units Date/Time    Blood culture x two cultures. Draw prior to antibiotics. [[id number]] Collected:  03/19/20 2005    Order Status:  Completed Specimen:  Blood from Peripheral, Antecubital, Right Updated:  03/24/20 2232     Blood Culture, Routine No growth after 5 days.    Narrative:       Aerobic and anaerobic    Blood culture x two cultures. Draw prior to antibiotics. [[id number]] Collected:  03/19/20 1930    Order Status:  Completed Specimen:  Blood from Peripheral, Upper Arm, Right Updated:  03/24/20 2232     Blood Culture, Routine No growth after 5 days.    Narrative:       Aerobic and anaerobic    Culture, Respiratory with Gram Stain [[id number]] Collected:  03/22/20 1401    Order Status:  Completed Specimen:  Respiratory from Tracheal Aspirate Updated:  03/24/20 0713     Respiratory Culture Reduced normal respiratory indu     Gram Stain (Respiratory) <10 epithelial cells per low power field.     Gram Stain (Respiratory) Few WBC's     Gram Stain (Respiratory) Rare Gram positive cocci    Culture, Respiratory with Gram Stain [[id number]] Collected:  03/22/20 1401    Order Status:  Canceled Specimen:  Respiratory from Tracheal Aspirate     Strep A culture, throat [[id number]] Collected:  03/19/20 2000    Order Status:  Completed Specimen:  Throat Updated:  03/22/20 0843     Strep A Culture No significant growth      No  Group A  Streptococcus isolated    Urine culture [[id number]] Collected:  03/19/20 2103    Order Status:  Completed Specimen:   Urine, Clean Catch Updated:  03/22/20 0749     Urine Culture, Routine No growth    Narrative:       Indicated criteria for high risk culture:->Other  Other (specify):->possible sepsis            Assessment/Plan:      Active Hospital Problems    Diagnosis  POA    Suspected Covid-19 Virus Infection [R68.89]  Yes     Priority: 2     Anemia [D64.9]  Yes    Pneumonia of both lungs due to infectious organism [J18.9]  Yes    Pharyngeal dysphagia [R13.13]  Yes    CVA (cerebral vascular accident) [I63.9]  Yes     Chronic    Gait instability [R26.81]  Yes    Aspiration pneumonia [J69.0]  Yes    HTN (hypertension) [I10]  Yes      Resolved Hospital Problems    Diagnosis Date Resolved POA    *Acute hypoxemic respiratory failure [J96.01] 03/27/2020 Yes    Hypophosphatemia [E83.39] 03/27/2020 No    Hypokalemia [E87.6] 03/27/2020 No       Plan:  S/p treatment of aspiration pneumonia with piperacillin-tazobactam.  Suspected COVID-19; PCR is currently pending  Recent MBSS from 12/2019 with severe pharyngeal dysphagia  Family not ready for PEG tube; will discontinue TPN  Strict NPO  Air bone and droplet isolation precautions for suspected COVID-19    Dispo:  Plan is to go to [facility] with hospice at the time of discharge; however he can only be accepted after COVID-19 testing results as they don't have isolation rooms currently       VTE Risk Mitigation (From admission, onward)         Ordered     enoxaparin injection 40 mg  Every 24 hours (non-standard times)      03/20/20 1439     IP VTE LOW RISK PATIENT  Once      03/20/20 0247     Place sequential compression device  Until discontinued      03/20/20 0247                      Elie Mcghee MD  Department of Hospital Medicine   [facility]

## 2020-03-28 NOTE — PLAN OF CARE
03/27/20 2000   Patient Assessment/Suction   Level of Consciousness (AVPU) responds to voice   Expansion/Accessory Muscles/Retractions no use of accessory muscles;no retractions;expansion symmetric   WENDY Breath Sounds clear;equal bilaterally   Cough Frequency infrequent   PRE-TX-O2   O2 Device (Oxygen Therapy) nasal cannula   Flow (L/min) 3   SpO2 96 %   Aerosol Therapy   $ Aerosol Therapy Charges PRN treatment not required   Daily Review of Necessity (SVN) completed   Respiratory Treatment Status (SVN) PRN treatment not required   Respiratory Evaluation   $ Care Plan Tech Time 15 min

## 2020-03-29 LAB
ALBUMIN SERPL BCP-MCNC: 2.7 G/DL (ref 3.5–5.2)
ALP SERPL-CCNC: 71 U/L (ref 55–135)
ALT SERPL W/O P-5'-P-CCNC: 101 U/L (ref 10–44)
ANION GAP SERPL CALC-SCNC: 7 MMOL/L (ref 8–16)
AST SERPL-CCNC: 76 U/L (ref 10–40)
BASOPHILS # BLD AUTO: 0.01 K/UL (ref 0–0.2)
BASOPHILS NFR BLD: 0.2 % (ref 0–1.9)
BILIRUB SERPL-MCNC: 0.7 MG/DL (ref 0.1–1)
BUN SERPL-MCNC: 18 MG/DL (ref 8–23)
CALCIUM SERPL-MCNC: 8.5 MG/DL (ref 8.7–10.5)
CHLORIDE SERPL-SCNC: 108 MMOL/L (ref 95–110)
CO2 SERPL-SCNC: 23 MMOL/L (ref 23–29)
CREAT SERPL-MCNC: 0.6 MG/DL (ref 0.5–1.4)
DIFFERENTIAL METHOD: ABNORMAL
EOSINOPHIL # BLD AUTO: 0.1 K/UL (ref 0–0.5)
EOSINOPHIL NFR BLD: 1.9 % (ref 0–8)
ERYTHROCYTE [DISTWIDTH] IN BLOOD BY AUTOMATED COUNT: 12.2 % (ref 11.5–14.5)
EST. GFR  (AFRICAN AMERICAN): >60 ML/MIN/1.73 M^2
EST. GFR  (NON AFRICAN AMERICAN): >60 ML/MIN/1.73 M^2
GLUCOSE SERPL-MCNC: 71 MG/DL (ref 70–110)
GLUCOSE SERPL-MCNC: 84 MG/DL (ref 70–110)
GLUCOSE SERPL-MCNC: 90 MG/DL (ref 70–110)
HCT VFR BLD AUTO: 30.5 % (ref 40–54)
HGB BLD-MCNC: 9.8 G/DL (ref 14–18)
IMM GRANULOCYTES # BLD AUTO: 0.04 K/UL (ref 0–0.04)
IMM GRANULOCYTES NFR BLD AUTO: 0.8 % (ref 0–0.5)
LYMPHOCYTES # BLD AUTO: 1.3 K/UL (ref 1–4.8)
LYMPHOCYTES NFR BLD: 23.8 % (ref 18–48)
MAGNESIUM SERPL-MCNC: 2.4 MG/DL (ref 1.6–2.6)
MCH RBC QN AUTO: 31 PG (ref 27–31)
MCHC RBC AUTO-ENTMCNC: 32.1 G/DL (ref 32–36)
MCV RBC AUTO: 97 FL (ref 82–98)
MONOCYTES # BLD AUTO: 0.5 K/UL (ref 0.3–1)
MONOCYTES NFR BLD: 10.2 % (ref 4–15)
NEUTROPHILS # BLD AUTO: 3.3 K/UL (ref 1.8–7.7)
NEUTROPHILS NFR BLD: 63.1 % (ref 38–73)
NRBC BLD-RTO: 0 /100 WBC
PHOSPHATE SERPL-MCNC: 4 MG/DL (ref 2.7–4.5)
PLATELET # BLD AUTO: 223 K/UL (ref 150–350)
PMV BLD AUTO: 10.5 FL (ref 9.2–12.9)
POTASSIUM SERPL-SCNC: 4.2 MMOL/L (ref 3.5–5.1)
PROT SERPL-MCNC: 6.9 G/DL (ref 6–8.4)
RBC # BLD AUTO: 3.16 M/UL (ref 4.6–6.2)
SARS-COV-2 RNA RESP QL NAA+PROBE: DETECTED
SODIUM SERPL-SCNC: 138 MMOL/L (ref 136–145)
WBC # BLD AUTO: 5.29 K/UL (ref 3.9–12.7)

## 2020-03-29 PROCEDURE — 21400001 HC TELEMETRY ROOM

## 2020-03-29 PROCEDURE — 99900035 HC TECH TIME PER 15 MIN (STAT)

## 2020-03-29 PROCEDURE — 63600175 PHARM REV CODE 636 W HCPCS: Performed by: INTERNAL MEDICINE

## 2020-03-29 PROCEDURE — 27000221 HC OXYGEN, UP TO 24 HOURS

## 2020-03-29 PROCEDURE — 80053 COMPREHEN METABOLIC PANEL: CPT

## 2020-03-29 PROCEDURE — 94761 N-INVAS EAR/PLS OXIMETRY MLT: CPT

## 2020-03-29 PROCEDURE — 85025 COMPLETE CBC W/AUTO DIFF WBC: CPT

## 2020-03-29 PROCEDURE — 84100 ASSAY OF PHOSPHORUS: CPT

## 2020-03-29 PROCEDURE — S0028 INJECTION, FAMOTIDINE, 20 MG: HCPCS | Performed by: INTERNAL MEDICINE

## 2020-03-29 PROCEDURE — 25000003 PHARM REV CODE 250: Performed by: INTERNAL MEDICINE

## 2020-03-29 PROCEDURE — 83735 ASSAY OF MAGNESIUM: CPT

## 2020-03-29 RX ORDER — LORAZEPAM 2 MG/ML
2 INJECTION INTRAMUSCULAR
Status: DISCONTINUED | OUTPATIENT
Start: 2020-03-29 | End: 2020-04-07 | Stop reason: HOSPADM

## 2020-03-29 RX ORDER — HYDROMORPHONE HYDROCHLORIDE 1 MG/ML
0.5 INJECTION, SOLUTION INTRAMUSCULAR; INTRAVENOUS; SUBCUTANEOUS
Status: DISCONTINUED | OUTPATIENT
Start: 2020-03-29 | End: 2020-04-07 | Stop reason: HOSPADM

## 2020-03-29 RX ORDER — GLYCOPYRROLATE 0.2 MG/ML
0.2 INJECTION INTRAMUSCULAR; INTRAVENOUS 4 TIMES DAILY PRN
Status: DISCONTINUED | OUTPATIENT
Start: 2020-03-29 | End: 2020-04-07 | Stop reason: HOSPADM

## 2020-03-29 RX ADMIN — FAMOTIDINE 20 MG: 10 INJECTION INTRAVENOUS at 11:03

## 2020-03-29 RX ADMIN — SODIUM CHLORIDE: 0.9 INJECTION, SOLUTION INTRAVENOUS at 09:03

## 2020-03-29 RX ADMIN — MORPHINE SULFATE 2 MG: 2 INJECTION, SOLUTION INTRAMUSCULAR; INTRAVENOUS at 08:03

## 2020-03-29 RX ADMIN — ENOXAPARIN SODIUM 40 MG: 100 INJECTION SUBCUTANEOUS at 02:03

## 2020-03-29 RX ADMIN — LORAZEPAM 2 MG: 2 INJECTION INTRAMUSCULAR; INTRAVENOUS at 05:03

## 2020-03-29 NOTE — PLAN OF CARE
03/28/20 2000   PRE-TX-O2   O2 Device (Oxygen Therapy) nasal cannula   Flow (L/min) 2   SpO2 98 %   Inhaler   $ Inhaler Charges PRN treatment not required   Daily Review of Necessity (Inhaler) completed   Respiratory Treatment Status (Inhaler) PRN treatment not required   Respiratory Evaluation   $ Care Plan Tech Time 15 min

## 2020-03-29 NOTE — PROGRESS NOTES
"[facility] Medicine Progress Note  Patient Name: Mukesh Addison MRN: [id number]   Patient Class: IP- Inpatient  Length of Stay: 9   Admission Date: 3/19/2020  7:12 PM Attending Physician: Karri Ball MD   Primary Care Provider: Primary Doctor No Face-to-Face encounter date: 03/29/2020   Chief Complaint: Shortness of Breath and Fever    Assessment & Plan:   Mukesh Addison is a 69 y.o. male admitted for     Diagnosis   POA    Suspected Covid-19 Virus Infection [R68.89]   Yes       Priority: 2     Anemia [D64.9]   Yes    Pneumonia of both lungs due to infectious organism [J18.9]   Yes    Pharyngeal dysphagia [R13.13]   Yes    CVA (cerebral vascular accident) [I63.9]   Yes       Chronic    Gait instability [R26.81]   Yes    Aspiration pneumonia [J69.0]   Yes    HTN (hypertension) [I10]   Yes       Resolved Hospital Problems     Diagnosis Date Resolved POA    *Acute hypoxemic respiratory failure [J96.01] 03/27/2020 Yes    Hypophosphatemia [E83.39] 03/27/2020 No    Hypokalemia [E87.6] 03/27/2020 No      COVID 19 positive  Patient made comfort care after discussing with family.   Will discontinue all lab testing  Initiate pain medications and anxiolytics to make him comfortable    Discharge Planning:   Comfort care    Subjective:    Interval History: not responsive  Review of Systems All other Review of Systems were found to be negative expect for that mentioned already in HPI.   Objective:   Physical Exam  /71   Pulse (!) 50   Temp 98.8 °F (37.1 °C) (Axillary)   Resp 16   Ht 5' 5" (1.651 m)   Wt 70.2 kg (154 lb 12.2 oz)   SpO2 99%   BMI 25.75 kg/m²   Vitals reviewed.    Constitutional: unresponsive   HENT: Atraumatic.   Cardiovascular: Normal rate, regular rhythm and normal heart sounds.   Pulmonary/Chest: coarse breath sounds.   Abdominal: Soft.   Neurological: not alert  Skin: Skin is warm and dry.     Following labs were Reviewed   Recent Labs   Lab 03/29/20  0555   WBC " 5.29   HGB 9.8*   HCT 30.5*      CALCIUM 8.5*   ALBUMIN 2.7*   PROT 6.9      K 4.2   CO2 23      BUN 18   CREATININE 0.6   ALKPHOS 71   *   AST 76*   BILITOT 0.7     No results found for: POCTGLUCOSE     All labs within the past 24 hours have been reviewed  Microbiology Results (last 7 days)     Procedure Component Value Units Date/Time    Blood culture x two cultures. Draw prior to antibiotics. [[id number]] Collected:  03/19/20 2005    Order Status:  Completed Specimen:  Blood from Peripheral, Antecubital, Right Updated:  03/24/20 2232     Blood Culture, Routine No growth after 5 days.    Narrative:       Aerobic and anaerobic    Blood culture x two cultures. Draw prior to antibiotics. [[id number]] Collected:  03/19/20 1930    Order Status:  Completed Specimen:  Blood from Peripheral, Upper Arm, Right Updated:  03/24/20 2232     Blood Culture, Routine No growth after 5 days.    Narrative:       Aerobic and anaerobic    Culture, Respiratory with Gram Stain [[id number]] Collected:  03/22/20 1401    Order Status:  Completed Specimen:  Respiratory from Tracheal Aspirate Updated:  03/24/20 0713     Respiratory Culture Reduced normal respiratory indu     Gram Stain (Respiratory) <10 epithelial cells per low power field.     Gram Stain (Respiratory) Few WBC's     Gram Stain (Respiratory) Rare Gram positive cocci        X-Ray Chest AP Portable   Final Result      X-Ray KUB   Final Result      Mild distention of bowel loops suggesting ileus or constipation considering rectal fecal material.         Electronically signed by: Cresencio Shaw MD   Date:    03/24/2020   Time:    14:58      X-Ray Chest 1 View for PICC_Central line   Final Result      Malpositioned feeding tube within right lower lobe.  I called these findings to the ICU nurse caring for the patient at 10:20 am and he reported the tube has already been removed.      Appropriate positioning of right PICC.      Stable interstitial and  alveolar opacities within both lungs.         Electronically signed by: Cresencio Shaw MD   Date:    03/24/2020   Time:    10:23      X-Ray Chest AP Portable   Final Result      X-Ray Chest AP Portable   Final Result      Satisfactory position of the endotracheal tube.      Persistent bilateral lung opacity with question of slight improvement compared to 03/22/2020         Electronically signed by: Dane Guevara MD   Date:    03/23/2020   Time:    08:01      X-Ray Chest AP Portable   Final Result      Progression of the interstitial and alveolar opacities in both lungs predominantly in the upper and mid lungs.  Differential includes viral or atypical pneumonia versus edema         Electronically signed by: Roz Eugene MD   Date:    03/22/2020   Time:    07:10      CTA Chest Non Coronary   Final Result      Negative for central pulmonary embolism.  Limited assessment of peripheral pulmonary arteries due to motion artifact.      Patchy opacities within both lungs consistent with multifocal pneumonia.      Atherosclerosis, cholelithiasis, and other incidental findings as above.         Electronically signed by: Cresencio Shaw MD   Date:    03/20/2020   Time:    10:00      X-Ray Chest AP Portable   Final Result      Scattered interstitial opacities throughout both lungs, which given clinical history are suggestive of viral or atypical pneumonia.  No consolidated pneumonia.         Electronically signed by: Alfred Ponce MD   Date:    03/20/2020   Time:    07:52      X-Ray Chest AP Portable   Final Result      1. Faint bilateral pulmonary opacities suspicious for bilateral pulmonary infiltrates, right greater than left.   2. No other significant findings.         Electronically signed by: Gavino Lazo MD   Date:    03/19/2020   Time:    19:41          Inpatient medications  Scheduled Meds:   chlorhexidine  15 mL Mouth/Throat BID    enoxparin  40 mg Subcutaneous Q24H    famotidine (PF)  20 mg Intravenous BID      Continuous Infusions:   sodium chloride 0.9% 75 mL/hr at 03/29/20 0900     PRN Meds:.acetaminophen, acetaminophen, albuterol, calcium chloride IVPB, calcium chloride IVPB, calcium chloride IVPB, dextrose 50%, dextrose 50%, hydrALAZINE, insulin regular, magnesium sulfate IVPB, magnesium sulfate IVPB, magnesium sulfate IVPB, magnesium sulfate IVPB, morphine, potassium chloride in water, potassium chloride in water, potassium chloride in water, potassium chloride in water, sodium chloride 0.9%, sodium phosphate IVPB, sodium phosphate IVPB, sodium phosphate IVPB, sodium phosphate IVPB, sodium phosphate IVPB, sodium phosphate IVPB    Above encounter included review of the medical records, interviewing and examining the patient face-to-face, discussion with family and other health care providers, ordering and interpreting lab/test results and formulating a plan of care.     Medical Decision Making:    [x] Low Complexity  [] Moderate Complexity  [] High Complexity    Karri Ball  [facility] Hospitalist  03/29/2020

## 2020-03-29 NOTE — PLAN OF CARE
03/29/20 0746   Patient Assessment/Suction   Level of Consciousness (AVPU) alert   Respiratory Effort Unlabored;Normal   Expansion/Accessory Muscles/Retractions no use of accessory muscles;no retractions;expansion symmetric   All Lung Fields Breath Sounds clear   Rhythm/Pattern, Respiratory unlabored;pattern regular;depth regular   Cough Frequency infrequent   Cough Type good   PRE-TX-O2   O2 Device (Oxygen Therapy) nasal cannula   $ Is the patient on Low Flow Oxygen? Yes   Flow (L/min) 3   SpO2 97 %   Pulse Oximetry Type Intermittent   $ Pulse Oximetry - Multiple Charge Pulse Oximetry - Multiple   Pulse 65   Resp 20   Inhaler   $ Inhaler Charges PRN treatment not required

## 2020-03-30 PROCEDURE — 27000221 HC OXYGEN, UP TO 24 HOURS

## 2020-03-30 PROCEDURE — 21400001 HC TELEMETRY ROOM

## 2020-03-30 PROCEDURE — 94761 N-INVAS EAR/PLS OXIMETRY MLT: CPT

## 2020-03-30 NOTE — PLAN OF CARE
Pt on comfort measure. Pain controlled with prn meds. Will cont to monitor. Pt positive for covid.

## 2020-03-30 NOTE — PLAN OF CARE
03/29/20 2000   PRE-TX-O2   O2 Device (Oxygen Therapy) nasal cannula   Flow (L/min) 3   SpO2 98 %   Pulse Oximetry Type Intermittent   $ Pulse Oximetry - Multiple Charge Pulse Oximetry - Multiple   Inhaler   $ Inhaler Charges PRN treatment not required   Daily Review of Necessity (Inhaler) completed   Respiratory Treatment Status (Inhaler) PRN treatment not required   Respiratory Evaluation   $ Care Plan Tech Time 15 min

## 2020-03-31 LAB — GLUCOSE SERPL-MCNC: 75 MG/DL (ref 70–110)

## 2020-03-31 PROCEDURE — 63600175 PHARM REV CODE 636 W HCPCS: Performed by: INTERNAL MEDICINE

## 2020-03-31 PROCEDURE — 21400001 HC TELEMETRY ROOM

## 2020-03-31 RX ADMIN — MORPHINE SULFATE 2 MG: 2 INJECTION, SOLUTION INTRAMUSCULAR; INTRAVENOUS at 01:03

## 2020-03-31 RX ADMIN — LORAZEPAM 2 MG: 2 INJECTION INTRAMUSCULAR; INTRAVENOUS at 06:03

## 2020-03-31 RX ADMIN — MORPHINE SULFATE 2 MG: 2 INJECTION, SOLUTION INTRAMUSCULAR; INTRAVENOUS at 10:03

## 2020-03-31 NOTE — HOSPITAL COURSE
03/31 Unresponsive.Waiting for acceptance back to [facility] with hospice.    04/01 Unresponsive. Waiting for acceptance back to NH. Continues with pyrexia.    04/02 Unresponsive. Waiting for acceptance back to NH. Continues with pyrexia.    04/03 Unresponsive. Waiting for acceptance back to NH. Pyrexia resolved; not on antipyretics.    04/04 Unresponsive. Waiting for acceptance back to NH. Pyrexia resolved; not on antipyretics.    04/05 Unresponsive. Waiting for acceptance back to NH. Pyrexia resolved; not on antipyretics.       04/06 Unresponsive. Waiting for acceptance back to NH. Pyrexia resolved; not on antipyretics.

## 2020-03-31 NOTE — SUBJECTIVE & OBJECTIVE
Interval History: unresponsive    Review of Systems   Unable to perform ROS: Patient nonverbal (Unresponsive)     Objective:     Vital Signs (Most Recent):  Temp: 99.5 °F (37.5 °C) (03/31/20 0854)  Pulse: (!) 56 (03/31/20 0854)  Resp: 19 (03/31/20 0854)  BP: (!) 147/77 (03/31/20 0854)  SpO2: 95 % (03/31/20 0854) Vital Signs (24h Range):  Temp:  [98.6 °F (37 °C)-99.5 °F (37.5 °C)] 99.5 °F (37.5 °C)  Pulse:  [56-66] 56  Resp:  [18-19] 19  SpO2:  [94 %-95 %] 95 %  BP: (117-147)/(67-77) 147/77     Weight: 70.2 kg (154 lb 12.2 oz)  Body mass index is 25.75 kg/m².    Intake/Output Summary (Last 24 hours) at 3/31/2020 1653  Last data filed at 3/31/2020 1400  Gross per 24 hour   Intake --   Output 1225 ml   Net -1225 ml      Physical Exam   Constitutional: He appears well-developed and well-nourished.   HENT:   Head: Normocephalic and atraumatic.   Eyes: Pupils are equal, round, and reactive to light.   Neck: Neck supple.   Cardiovascular: Normal rate, regular rhythm, normal heart sounds and intact distal pulses.   Pulmonary/Chest: Effort normal and breath sounds normal.   Decreased entry bases without adventitious sounds   Abdominal: Soft. Bowel sounds are normal.   Musculoskeletal: Normal range of motion.   Neurological:   Unable to fully assess   Skin: Skin is warm and dry. Capillary refill takes less than 2 seconds.   Psychiatric:   Unable to assess   Nursing note and vitals reviewed.      Significant Labs: BMP: No results for input(s): GLU, NA, K, CL, CO2, BUN, CREATININE, CALCIUM, MG in the last 48 hours.  CBC: No results for input(s): WBC, HGB, HCT, PLT in the last 48 hours.    Significant Imaging: I have reviewed and interpreted all pertinent imaging results/findings within the past 24 hours.

## 2020-03-31 NOTE — PROGRESS NOTES
[facility] Medicine  Progress Note    Patient Name: Mukesh Addison  MRN: [id number]  Patient Class: IP- Inpatient   Admission Date: 3/19/2020  Length of Stay: 11 days  Attending Physician: Landen Lofton MD  Primary Care Provider: Primary Doctor No        Subjective:     Principal Problem:Acute hypoxemic respiratory failure        HPI:  Mr Addison is a 70 yo CM pt who resides in [facility]. He was sent here from there for possible aspiration pneumonia, on xray R>L. They had noted that his temperature spiked at 103 degrees. On arrival here it was 102.2 and now 100.9. He is experiencing tachypnea ( respiratory rate 35 with no hypoxia) and is flushed in appearance. He is nonverbal and cannot provide any history. Has no family listed on his demographics. Pt has a history of a CVA and Parkinson's making him non verbal. He also has contractures to his upper and lower extremities. Respirations with some wheezing. Other history from past records GERD and HTN.    Overview/Hospital Course:  03/31 Unresponsive.Waiting for acceptance back to NH with hospice.    Interval History: unresponsive    Review of Systems   Unable to perform ROS: Patient nonverbal (Unresponsive)     Objective:     Vital Signs (Most Recent):  Temp: 99.5 °F (37.5 °C) (03/31/20 0854)  Pulse: (!) 56 (03/31/20 0854)  Resp: 19 (03/31/20 0854)  BP: (!) 147/77 (03/31/20 0854)  SpO2: 95 % (03/31/20 0854) Vital Signs (24h Range):  Temp:  [98.6 °F (37 °C)-99.5 °F (37.5 °C)] 99.5 °F (37.5 °C)  Pulse:  [56-66] 56  Resp:  [18-19] 19  SpO2:  [94 %-95 %] 95 %  BP: (117-147)/(67-77) 147/77     Weight: 70.2 kg (154 lb 12.2 oz)  Body mass index is 25.75 kg/m².    Intake/Output Summary (Last 24 hours) at 3/31/2020 1653  Last data filed at 3/31/2020 1400  Gross per 24 hour   Intake --   Output 1225 ml   Net -1225 ml      Physical Exam   Constitutional: He appears well-developed and well-nourished.   HENT:   Head: Normocephalic and atraumatic.   Eyes:  Pupils are equal, round, and reactive to light.   Neck: Neck supple.   Cardiovascular: Normal rate, regular rhythm, normal heart sounds and intact distal pulses.   Pulmonary/Chest: Effort normal and breath sounds normal.   Decreased entry bases without adventitious sounds   Abdominal: Soft. Bowel sounds are normal.   Musculoskeletal: Normal range of motion.   Neurological:   Unable to fully assess   Skin: Skin is warm and dry. Capillary refill takes less than 2 seconds.   Psychiatric:   Unable to assess   Nursing note and vitals reviewed.      Significant Labs: BMP: No results for input(s): GLU, NA, K, CL, CO2, BUN, CREATININE, CALCIUM, MG in the last 48 hours.  CBC: No results for input(s): WBC, HGB, HCT, PLT in the last 48 hours.    Significant Imaging: I have reviewed and interpreted all pertinent imaging results/findings within the past 24 hours.      Assessment/Plan:      HTN (hypertension)  Monitor BP  Hold PO meds for now.      Suspected Covid-19 Virus Infection  COVID possibility pt resides in a nursing facility, testing done  Keep in isolation      Aspiration pneumonia  Monitor oxygen levels  Oxygen to keep sats greater then 90%  SLP swallow assessment in am, NPO till then.  IV Azthromycin daily.  Consult to ID regarding Pneumonia vs COVID.        VTE Risk Mitigation (From admission, onward)         Ordered     IP VTE LOW RISK PATIENT  Once      03/20/20 0247     Place sequential compression device  Until discontinued      03/20/20 0247                      Landen Lofton MD  Department of Hospital Medicine   [facility]

## 2020-04-01 LAB
GLUCOSE SERPL-MCNC: 57 MG/DL (ref 70–110)
GLUCOSE SERPL-MCNC: 90 MG/DL (ref 70–110)

## 2020-04-01 PROCEDURE — 25000003 PHARM REV CODE 250: Performed by: INTERNAL MEDICINE

## 2020-04-01 PROCEDURE — 21400001 HC TELEMETRY ROOM

## 2020-04-01 PROCEDURE — 63600175 PHARM REV CODE 636 W HCPCS: Performed by: INTERNAL MEDICINE

## 2020-04-01 PROCEDURE — 27000221 HC OXYGEN, UP TO 24 HOURS

## 2020-04-01 RX ADMIN — DEXTROSE MONOHYDRATE 25 G: 25 INJECTION, SOLUTION INTRAVENOUS at 01:04

## 2020-04-01 RX ADMIN — GLYCOPYRROLATE 0.2 MG: 0.2 INJECTION INTRAMUSCULAR; INTRAVENOUS at 10:04

## 2020-04-01 RX ADMIN — MORPHINE SULFATE 2 MG: 2 INJECTION, SOLUTION INTRAMUSCULAR; INTRAVENOUS at 01:04

## 2020-04-01 RX ADMIN — LORAZEPAM 2 MG: 2 INJECTION INTRAMUSCULAR; INTRAVENOUS at 01:04

## 2020-04-01 RX ADMIN — MORPHINE SULFATE 2 MG: 2 INJECTION, SOLUTION INTRAMUSCULAR; INTRAVENOUS at 03:04

## 2020-04-01 RX ADMIN — MORPHINE SULFATE 2 MG: 2 INJECTION, SOLUTION INTRAMUSCULAR; INTRAVENOUS at 10:04

## 2020-04-01 NOTE — SUBJECTIVE & OBJECTIVE
Interval History: unresponsive/COVID-19 +    Review of Systems   Unable to perform ROS: Other (unresponsive)     Objective:     Vital Signs (Most Recent):  Temp: 98.4 °F (36.9 °C) (04/01/20 1227)  Pulse: (!) 56 (04/01/20 1227)  Resp: 19 (04/01/20 1227)  BP: 125/71 (04/01/20 1227)  SpO2: 99 % (04/01/20 1227) Vital Signs (24h Range):  Temp:  [98.4 °F (36.9 °C)-99.9 °F (37.7 °C)] 98.4 °F (36.9 °C)  Pulse:  [53-62] 56  Resp:  [18-20] 19  SpO2:  [96 %-99 %] 99 %  BP: (119-144)/(71-78) 125/71     Weight: 70.2 kg (154 lb 12.2 oz)  Body mass index is 25.75 kg/m².    Intake/Output Summary (Last 24 hours) at 4/1/2020 1523  Last data filed at 4/1/2020 0400  Gross per 24 hour   Intake --   Output 600 ml   Net -600 ml      Physical Exam    Significant Labs: BMP: No results for input(s): GLU, NA, K, CL, CO2, BUN, CREATININE, CALCIUM, MG in the last 48 hours.  CBC: No results for input(s): WBC, HGB, HCT, PLT in the last 48 hours.    Significant Imaging: I have reviewed and interpreted all pertinent imaging results/findings within the past 24 hours.

## 2020-04-01 NOTE — PROGRESS NOTES
[facility] Medicine  Progress Note    Patient Name: Mukesh Addison  MRN: [id number]  Patient Class: IP- Inpatient   Admission Date: 3/19/2020  Length of Stay: 12 days  Attending Physician: Landen Lofton MD  Primary Care Provider: Primary Doctor No        Subjective:     Principal Problem:Acute hypoxemic respiratory failure        HPI:  Mr Addison is a 70 yo CM pt who resides in [facility]. He was sent here from there for possible aspiration pneumonia, on xray R>L. They had noted that his temperature spiked at 103 degrees. On arrival here it was 102.2 and now 100.9. He is experiencing tachypnea ( respiratory rate 35 with no hypoxia) and is flushed in appearance. He is nonverbal and cannot provide any history. Has no family listed on his demographics. Pt has a history of a CVA and Parkinson's making him non verbal. He also has contractures to his upper and lower extremities. Respirations with some wheezing. Other history from past records GERD and HTN.    Overview/Hospital Course:  03/31 Unresponsive.Waiting for acceptance back to NH with hospice.    04/01 Unresponsive. Waiting for acceptance back to NH. Continues with pyrexia.    Interval History: unresponsive/COVID-19 +    Review of Systems   Unable to perform ROS: Other (unresponsive)     Objective:     Vital Signs (Most Recent):  Temp: 98.4 °F (36.9 °C) (04/01/20 1227)  Pulse: (!) 56 (04/01/20 1227)  Resp: 19 (04/01/20 1227)  BP: 125/71 (04/01/20 1227)  SpO2: 99 % (04/01/20 1227) Vital Signs (24h Range):  Temp:  [98.4 °F (36.9 °C)-99.9 °F (37.7 °C)] 98.4 °F (36.9 °C)  Pulse:  [53-62] 56  Resp:  [18-20] 19  SpO2:  [96 %-99 %] 99 %  BP: (119-144)/(71-78) 125/71     Weight: 70.2 kg (154 lb 12.2 oz)  Body mass index is 25.75 kg/m².    Intake/Output Summary (Last 24 hours) at 4/1/2020 1525  Last data filed at 4/1/2020 0400  Gross per 24 hour   Intake --   Output 600 ml   Net -600 ml      Physical Exam    Significant Labs: BMP: No results for  input(s): GLU, NA, K, CL, CO2, BUN, CREATININE, CALCIUM, MG in the last 48 hours.  CBC: No results for input(s): WBC, HGB, HCT, PLT in the last 48 hours.    Significant Imaging: I have reviewed and interpreted all pertinent imaging results/findings within the past 24 hours.      Assessment/Plan:      HTN (hypertension)  Monitor BP  Hold PO meds for now.      Suspected Covid-19 Virus Infection  COVID possibility pt resides in a nursing facility, testing done  Keep in isolation      Aspiration pneumonia  Monitor oxygen levels  Oxygen to keep sats greater then 90%  SLP swallow assessment in am, NPO till then.  IV Azthromycin daily.  Consult to ID regarding Pneumonia vs COVID.        VTE Risk Mitigation (From admission, onward)         Ordered     IP VTE LOW RISK PATIENT  Once      03/20/20 0247     Place sequential compression device  Until discontinued      03/20/20 0247                      Landen Lofton MD  Department of Hospital Medicine   [facility]

## 2020-04-02 PROCEDURE — 21400001 HC TELEMETRY ROOM

## 2020-04-02 PROCEDURE — 27000221 HC OXYGEN, UP TO 24 HOURS

## 2020-04-02 PROCEDURE — 94761 N-INVAS EAR/PLS OXIMETRY MLT: CPT

## 2020-04-02 PROCEDURE — 63600175 PHARM REV CODE 636 W HCPCS: Performed by: INTERNAL MEDICINE

## 2020-04-02 RX ADMIN — LORAZEPAM 2 MG: 2 INJECTION INTRAMUSCULAR; INTRAVENOUS at 03:04

## 2020-04-02 NOTE — SUBJECTIVE & OBJECTIVE
Interval History: Unresponsive    Review of Systems   Reason unable to perform ROS: unresponsive.     Objective:     Vital Signs (Most Recent):  Temp: 98.6 °F (37 °C) (04/02/20 0706)  Pulse: (!) 48 (04/02/20 0840)  Resp: 20 (04/02/20 0840)  BP: (!) 144/70 (04/02/20 0706)  SpO2: 98 % (04/02/20 0840) Vital Signs (24h Range):  Temp:  [97.8 °F (36.6 °C)-98.6 °F (37 °C)] 98.6 °F (37 °C)  Pulse:  [47-54] 48  Resp:  [18-20] 20  SpO2:  [98 %-99 %] 98 %  BP: (131-144)/(70-73) 144/70     Weight: 70.2 kg (154 lb 12.2 oz)  Body mass index is 25.75 kg/m².    Intake/Output Summary (Last 24 hours) at 4/2/2020 1702  Last data filed at 4/2/2020 0301  Gross per 24 hour   Intake --   Output 350 ml   Net -350 ml      Physical Exam   Constitutional: He appears well-developed.   HENT:   Head: Normocephalic and atraumatic.   Eyes: Pupils are equal, round, and reactive to light.   Neck: Neck supple.   Cardiovascular: Normal rate, regular rhythm, normal heart sounds and intact distal pulses.   Pulmonary/Chest:   Decreased entry bases without adventitious sounds   Abdominal: Soft. Bowel sounds are normal.   Musculoskeletal:   Unable to fully assess; unresponsive   Neurological:   Unable to fully assess; unresponsive   Skin: Skin is warm and dry. Capillary refill takes less than 2 seconds.   Psychiatric:   Unable to fully assess; unresponsive   Nursing note and vitals reviewed.      Significant Labs: BMP: No results for input(s): GLU, NA, K, CL, CO2, BUN, CREATININE, CALCIUM, MG in the last 48 hours.  CBC: No results for input(s): WBC, HGB, HCT, PLT in the last 48 hours.    Significant Imaging: I have reviewed and interpreted all pertinent imaging results/findings within the past 24 hours.

## 2020-04-02 NOTE — PROGRESS NOTES
[facility] Medicine  Progress Note    Patient Name: Mukesh Addison  MRN: [id number]  Patient Class: IP- Inpatient   Admission Date: 3/19/2020  Length of Stay: 13 days  Attending Physician: Landen Lofton MD  Primary Care Provider: Primary Doctor No        Subjective:     Principal Problem:Acute hypoxemic respiratory failure        HPI:  Mr Addison is a 68 yo CM pt who resides in [facility]. He was sent here from there for possible aspiration pneumonia, on xray R>L. They had noted that his temperature spiked at 103 degrees. On arrival here it was 102.2 and now 100.9. He is experiencing tachypnea ( respiratory rate 35 with no hypoxia) and is flushed in appearance. He is nonverbal and cannot provide any history. Has no family listed on his demographics. Pt has a history of a CVA and Parkinson's making him non verbal. He also has contractures to his upper and lower extremities. Respirations with some wheezing. Other history from past records GERD and HTN.    Overview/Hospital Course:  03/31 Unresponsive.Waiting for acceptance back to NH with hospice.    04/01 Unresponsive. Waiting for acceptance back to NH. Continues with pyrexia.    04/02 Unresponsive. Waiting for acceptance back to NH. Continues with pyrexia.    Interval History: Unresponsive    Review of Systems   Reason unable to perform ROS: unresponsive.     Objective:     Vital Signs (Most Recent):  Temp: 98.6 °F (37 °C) (04/02/20 0706)  Pulse: (!) 48 (04/02/20 0840)  Resp: 20 (04/02/20 0840)  BP: (!) 144/70 (04/02/20 0706)  SpO2: 98 % (04/02/20 0840) Vital Signs (24h Range):  Temp:  [97.8 °F (36.6 °C)-98.6 °F (37 °C)] 98.6 °F (37 °C)  Pulse:  [47-54] 48  Resp:  [18-20] 20  SpO2:  [98 %-99 %] 98 %  BP: (131-144)/(70-73) 144/70     Weight: 70.2 kg (154 lb 12.2 oz)  Body mass index is 25.75 kg/m².    Intake/Output Summary (Last 24 hours) at 4/2/2020 1702  Last data filed at 4/2/2020 0301  Gross per 24 hour   Intake --   Output 350 ml   Net  -350 ml      Physical Exam   Constitutional: He appears well-developed.   HENT:   Head: Normocephalic and atraumatic.   Eyes: Pupils are equal, round, and reactive to light.   Neck: Neck supple.   Cardiovascular: Normal rate, regular rhythm, normal heart sounds and intact distal pulses.   Pulmonary/Chest:   Decreased entry bases without adventitious sounds   Abdominal: Soft. Bowel sounds are normal.   Musculoskeletal:   Unable to fully assess; unresponsive   Neurological:   Unable to fully assess; unresponsive   Skin: Skin is warm and dry. Capillary refill takes less than 2 seconds.   Psychiatric:   Unable to fully assess; unresponsive   Nursing note and vitals reviewed.      Significant Labs: BMP: No results for input(s): GLU, NA, K, CL, CO2, BUN, CREATININE, CALCIUM, MG in the last 48 hours.  CBC: No results for input(s): WBC, HGB, HCT, PLT in the last 48 hours.    Significant Imaging: I have reviewed and interpreted all pertinent imaging results/findings within the past 24 hours.      Assessment/Plan:      Anemia  Continue current regimen      Gait instability    Continue current regimen    CVA (cerebral vascular accident)  Continue current regimen      Pharyngeal dysphagia    Continue current regimen    Pneumonia of both lungs due to infectious organism  Continue current regimen      HTN (hypertension)  Continue current regimen      Suspected Covid-19 Virus Infection  COVID Keep in isolation  Continue current regimen    Aspiration pneumonia  Continue current regimen        VTE Risk Mitigation (From admission, onward)         Ordered     IP VTE LOW RISK PATIENT  Once      03/20/20 0247     Place sequential compression device  Until discontinued      03/20/20 0247                      Landen Lofton MD  Department of Hospital Medicine   [facility]

## 2020-04-03 PROBLEM — U07.1 COVID-19 VIRUS DETECTED: Status: ACTIVE | Noted: 2020-04-03

## 2020-04-03 PROCEDURE — 21400001 HC TELEMETRY ROOM

## 2020-04-03 NOTE — PROGRESS NOTES
[facility] Medicine  Progress Note    Patient Name: Mukesh Addison  MRN: [id number]  Patient Class: IP- Inpatient   Admission Date: 3/19/2020  Length of Stay: 14 days  Attending Physician: Landen Lofton MD  Primary Care Provider: Primary Doctor No        Subjective:     Principal Problem:Acute hypoxemic respiratory failure        HPI:  Mr Addison is a 70 yo CM pt who resides in [facility]. He was sent here from there for possible aspiration pneumonia, on xray R>L. They had noted that his temperature spiked at 103 degrees. On arrival here it was 102.2 and now 100.9. He is experiencing tachypnea ( respiratory rate 35 with no hypoxia) and is flushed in appearance. He is nonverbal and cannot provide any history. Has no family listed on his demographics. Pt has a history of a CVA and Parkinson's making him non verbal. He also has contractures to his upper and lower extremities. Respirations with some wheezing. Other history from past records GERD and HTN.    Overview/Hospital Course:  03/31 Unresponsive.Waiting for acceptance back to NH with hospice.    04/01 Unresponsive. Waiting for acceptance back to NH. Continues with pyrexia.    04/02 Unresponsive. Waiting for acceptance back to NH. Continues with pyrexia.    04/03 Unresponsive. Waiting for acceptance back to NH. Pyrexia resolved; not on antipyretics.     Persistent vegetative state (s/p CVA) with functional quadriplegia     Interval History: no change    Review of Systems   Reason unable to perform ROS: unresponsive.     Objective:     Vital Signs (Most Recent):  Temp: 98.4 °F (36.9 °C) (04/03/20 0730)  Pulse: 63 (04/03/20 0730)  Resp: (!) 24 (04/03/20 0730)  BP: 110/72 (04/03/20 0730)  SpO2: 99 % (04/03/20 0730) Vital Signs (24h Range):  Temp:  [98.4 °F (36.9 °C)-98.8 °F (37.1 °C)] 98.4 °F (36.9 °C)  Pulse:  [63-88] 63  Resp:  [18-24] 24  SpO2:  [97 %-99 %] 99 %  BP: (110-116)/(72-75) 110/72     Weight: 70.2 kg (154 lb 12.2 oz)  Body mass  index is 25.75 kg/m².    Intake/Output Summary (Last 24 hours) at 4/3/2020 1513  Last data filed at 4/3/2020 0600  Gross per 24 hour   Intake 0 ml   Output 1050 ml   Net -1050 ml      Physical Exam   Constitutional: He appears well-developed and well-nourished.   HENT:   Head: Normocephalic and atraumatic.   Eyes: Pupils are equal, round, and reactive to light. Conjunctivae are normal.   Neck: Neck supple.   Cardiovascular: Normal rate, regular rhythm, normal heart sounds and intact distal pulses.   Pulmonary/Chest:   Decreased entry without adventitious sounds   Abdominal: Soft. Bowel sounds are normal.   Musculoskeletal: Normal range of motion.   Neurological:   Unresponsive    Skin: Skin is warm and dry. Capillary refill takes less than 2 seconds.   Psychiatric:   Unresponsive    Nursing note and vitals reviewed.      Significant Labs: BMP: No results for input(s): GLU, NA, K, CL, CO2, BUN, CREATININE, CALCIUM, MG in the last 48 hours.  CBC: No results for input(s): WBC, HGB, HCT, PLT in the last 48 hours.    Significant Imaging: I have reviewed and interpreted all pertinent imaging results/findings within the past 24 hours.      Assessment/Plan:      COVID-19 virus detected    Continue current regimen; transfer to NH with hospice when apyrexic X > 3 days off of antipyrexics    Anemia  Continue current regimen      Gait instability    Continue current regimen    CVA (cerebral vascular accident)  Continue current regimen      Pharyngeal dysphagia    Continue current regimen    Pneumonia of both lungs due to infectious organism  Continue current regimen      HTN (hypertension)  Continue current regimen      Suspected Covid-19 Virus Infection  COVID Keep in isolation  Continue current regimen    Aspiration pneumonia  Continue current regimen        VTE Risk Mitigation (From admission, onward)         Ordered     IP VTE LOW RISK PATIENT  Once      03/20/20 0247     Place sequential compression device  Until  discontinued      03/20/20 0247                      Landen Lofton MD  Department of Hospital Medicine   [facility]

## 2020-04-03 NOTE — PHYSICIAN QUERY
PT Name: Mukesh Addison  MR #: [id number]    Physician Query Form - Pneumonia Clarification     CDS/: Chiqui Martinez               Contact information:  This form is a permanent document in the medical record.    Query Date:  April 3, 2020    By submitting this query, we are merely seeking further clarification of documentation. Please utilize your independent clinical judgment when addressing the question(s) below.    The Medical record contains the following:  Pt positive for COVID 19    Dr Mcghee's PN 3/24  Included the chest xray that reveals-Bilateral interstitial and alveolar opacities potentially reflecting viral pneumonia     ER Note-  Concern for Aspiration PNA    H/P and throughout the record-  He was sent here from there for possible aspiration pneumonia, on xray R>L              Provider, please specify type or types of pneumonia confirmed or suspected. HOLLIE ALL THAT APPLY.    [   ] Bacterial Pneumonia (Specify organism):   [   ] VIRAL PNEUMONIA ASSOCIATED WITH COVID 19   [   ] Viral Pneumonia (Specify virus):   [   ] Fungal Pneumonia (Specify organism):   [  x ] Aspiration Pneumonia   [   ] Other type of pneumonia (please specify):   [  ] Clinically undetermined         Please document in your progress notes daily for the duration of treatment, until resolved, and include in your discharge summary.    .

## 2020-04-03 NOTE — SUBJECTIVE & OBJECTIVE
Interval History: no change    Review of Systems   Reason unable to perform ROS: unresponsive.     Objective:     Vital Signs (Most Recent):  Temp: 98.4 °F (36.9 °C) (04/03/20 0730)  Pulse: 63 (04/03/20 0730)  Resp: (!) 24 (04/03/20 0730)  BP: 110/72 (04/03/20 0730)  SpO2: 99 % (04/03/20 0730) Vital Signs (24h Range):  Temp:  [98.4 °F (36.9 °C)-98.8 °F (37.1 °C)] 98.4 °F (36.9 °C)  Pulse:  [63-88] 63  Resp:  [18-24] 24  SpO2:  [97 %-99 %] 99 %  BP: (110-116)/(72-75) 110/72     Weight: 70.2 kg (154 lb 12.2 oz)  Body mass index is 25.75 kg/m².    Intake/Output Summary (Last 24 hours) at 4/3/2020 1513  Last data filed at 4/3/2020 0600  Gross per 24 hour   Intake 0 ml   Output 1050 ml   Net -1050 ml      Physical Exam   Constitutional: He appears well-developed and well-nourished.   HENT:   Head: Normocephalic and atraumatic.   Eyes: Pupils are equal, round, and reactive to light. Conjunctivae are normal.   Neck: Neck supple.   Cardiovascular: Normal rate, regular rhythm, normal heart sounds and intact distal pulses.   Pulmonary/Chest:   Decreased entry without adventitious sounds   Abdominal: Soft. Bowel sounds are normal.   Musculoskeletal: Normal range of motion.   Neurological:   Unresponsive    Skin: Skin is warm and dry. Capillary refill takes less than 2 seconds.   Psychiatric:   Unresponsive    Nursing note and vitals reviewed.      Significant Labs: BMP: No results for input(s): GLU, NA, K, CL, CO2, BUN, CREATININE, CALCIUM, MG in the last 48 hours.  CBC: No results for input(s): WBC, HGB, HCT, PLT in the last 48 hours.    Significant Imaging: I have reviewed and interpreted all pertinent imaging results/findings within the past 24 hours.

## 2020-04-03 NOTE — ASSESSMENT & PLAN NOTE
Continue current regimen; transfer to NH with hospice when apyrexic X > 3 days off of antipyrexics

## 2020-04-04 LAB — GLUCOSE SERPL-MCNC: 86 MG/DL (ref 70–110)

## 2020-04-04 PROCEDURE — 63600175 PHARM REV CODE 636 W HCPCS: Performed by: INTERNAL MEDICINE

## 2020-04-04 PROCEDURE — 94761 N-INVAS EAR/PLS OXIMETRY MLT: CPT

## 2020-04-04 PROCEDURE — 21400001 HC TELEMETRY ROOM

## 2020-04-04 PROCEDURE — 27000221 HC OXYGEN, UP TO 24 HOURS

## 2020-04-04 RX ORDER — SODIUM CHLORIDE 9 MG/ML
INJECTION, SOLUTION INTRAVENOUS CONTINUOUS
Status: DISCONTINUED | OUTPATIENT
Start: 2020-04-04 | End: 2020-04-07

## 2020-04-04 RX ADMIN — SODIUM CHLORIDE: 0.9 INJECTION, SOLUTION INTRAVENOUS at 04:04

## 2020-04-04 RX ADMIN — LORAZEPAM 2 MG: 2 INJECTION INTRAMUSCULAR; INTRAVENOUS at 07:04

## 2020-04-04 NOTE — PROGRESS NOTES
[facility] Medicine  Progress Note    Patient Name: Mukesh Addison  MRN: [id number]  Patient Class: IP- Inpatient   Admission Date: 3/19/2020  Length of Stay: 15 days  Attending Physician: Landen Lofton MD  Primary Care Provider: Primary Doctor No        Subjective:     Principal Problem:Acute hypoxemic respiratory failure        HPI:  Mr Addison is a 68 yo CM pt who resides in [facility]. He was sent here from there for possible aspiration pneumonia, on xray R>L. They had noted that his temperature spiked at 103 degrees. On arrival here it was 102.2 and now 100.9. He is experiencing tachypnea ( respiratory rate 35 with no hypoxia) and is flushed in appearance. He is nonverbal and cannot provide any history. Has no family listed on his demographics. Pt has a history of a CVA and Parkinson's making him non verbal. He also has contractures to his upper and lower extremities. Respirations with some wheezing. Other history from past records GERD and HTN.    Overview/Hospital Course:  03/31 Unresponsive.Waiting for acceptance back to NH with hospice.    04/01 Unresponsive. Waiting for acceptance back to NH. Continues with pyrexia.    04/02 Unresponsive. Waiting for acceptance back to NH. Continues with pyrexia.    04/03 Unresponsive. Waiting for acceptance back to NH. Pyrexia resolved; not on antipyretics.    04/04 Unresponsive. Waiting for acceptance back to NH. Pyrexia resolved; not on antipyretics.    Interval History: covid    Review of Systems   Reason unable to perform ROS: unresponsive.     Objective:     Vital Signs (Most Recent):  Temp: 98.3 °F (36.8 °C) (04/04/20 0846)  Pulse: (!) 59 (04/04/20 1025)  Resp: 16 (04/04/20 1025)  BP: 106/69 (04/04/20 0846)  SpO2: 98 % (04/04/20 1025) Vital Signs (24h Range):  Temp:  [98.3 °F (36.8 °C)] 98.3 °F (36.8 °C)  Pulse:  [55-59] 59  Resp:  [13-16] 16  SpO2:  [98 %-99 %] 98 %  BP: (106)/(69) 106/69     Weight: 70.2 kg (154 lb 12.2 oz)  Body mass  index is 25.75 kg/m².    Intake/Output Summary (Last 24 hours) at 4/4/2020 1451  Last data filed at 4/4/2020 0400  Gross per 24 hour   Intake 0 ml   Output 500 ml   Net -500 ml      Physical Exam   Constitutional: He appears well-developed and well-nourished.   Chronically ill appearing  Unresponsive   HENT:   Head: Normocephalic and atraumatic.   Eyes: Pupils are equal, round, and reactive to light.   Neck: Neck supple.   Cardiovascular: Normal rate, regular rhythm, normal heart sounds and intact distal pulses.   Pulmonary/Chest:   Very decreased entry bases without adventitious sounds   Abdominal: Soft. Bowel sounds are normal.   Genitourinary:   Genitourinary Comments: Thick urine in Gerardo bag   Musculoskeletal: Normal range of motion.   Neurological:   unresponsive   Skin: Skin is warm and dry. Capillary refill takes less than 2 seconds.   Psychiatric:   Unable to assess   Nursing note and vitals reviewed.      Significant Labs: None    Significant Imaging: I have reviewed and interpreted all pertinent imaging results/findings within the past 24 hours.      Assessment/Plan:      COVID-19 virus detected    Continue current regimen; transfer to NH with hospice when apyrexic X > 3 days off of antipyrexics    Anemia  Continue current regimen      Gait instability    Continue current regimen    CVA (cerebral vascular accident)  Continue current regimen      Pharyngeal dysphagia    Continue current regimen    Pneumonia of both lungs due to infectious organism  Continue current regimen      HTN (hypertension)  Continue current regimen      Suspected Covid-19 Virus Infection  COVID Keep in isolation  Continue current regimen    Aspiration pneumonia  Continue current regimen        VTE Risk Mitigation (From admission, onward)         Ordered     IP VTE LOW RISK PATIENT  Once      03/20/20 0247     Place sequential compression device  Until discontinued      03/20/20 0247                      Landen Lofton,  MD  Department of Hospital Medicine   [facility]

## 2020-04-04 NOTE — SUBJECTIVE & OBJECTIVE
Interval History: covid    Review of Systems   Reason unable to perform ROS: unresponsive.     Objective:     Vital Signs (Most Recent):  Temp: 98.3 °F (36.8 °C) (04/04/20 0846)  Pulse: (!) 59 (04/04/20 1025)  Resp: 16 (04/04/20 1025)  BP: 106/69 (04/04/20 0846)  SpO2: 98 % (04/04/20 1025) Vital Signs (24h Range):  Temp:  [98.3 °F (36.8 °C)] 98.3 °F (36.8 °C)  Pulse:  [55-59] 59  Resp:  [13-16] 16  SpO2:  [98 %-99 %] 98 %  BP: (106)/(69) 106/69     Weight: 70.2 kg (154 lb 12.2 oz)  Body mass index is 25.75 kg/m².    Intake/Output Summary (Last 24 hours) at 4/4/2020 1451  Last data filed at 4/4/2020 0400  Gross per 24 hour   Intake 0 ml   Output 500 ml   Net -500 ml      Physical Exam   Constitutional: He appears well-developed and well-nourished.   Chronically ill appearing  Unresponsive   HENT:   Head: Normocephalic and atraumatic.   Eyes: Pupils are equal, round, and reactive to light.   Neck: Neck supple.   Cardiovascular: Normal rate, regular rhythm, normal heart sounds and intact distal pulses.   Pulmonary/Chest:   Very decreased entry bases without adventitious sounds   Abdominal: Soft. Bowel sounds are normal.   Genitourinary:   Genitourinary Comments: Thick urine in Gerardo bag   Musculoskeletal: Normal range of motion.   Neurological:   unresponsive   Skin: Skin is warm and dry. Capillary refill takes less than 2 seconds.   Psychiatric:   Unable to assess   Nursing note and vitals reviewed.      Significant Labs: None    Significant Imaging: I have reviewed and interpreted all pertinent imaging results/findings within the past 24 hours.

## 2020-04-05 PROBLEM — Z20.822 SUSPECTED COVID-19 VIRUS INFECTION: Status: RESOLVED | Noted: 2020-03-19 | Resolved: 2020-04-05

## 2020-04-05 PROCEDURE — 94761 N-INVAS EAR/PLS OXIMETRY MLT: CPT

## 2020-04-05 PROCEDURE — 99900035 HC TECH TIME PER 15 MIN (STAT)

## 2020-04-05 PROCEDURE — 21400001 HC TELEMETRY ROOM

## 2020-04-05 PROCEDURE — 63600175 PHARM REV CODE 636 W HCPCS: Performed by: INTERNAL MEDICINE

## 2020-04-05 PROCEDURE — 27000221 HC OXYGEN, UP TO 24 HOURS

## 2020-04-05 RX ADMIN — MORPHINE SULFATE 2 MG: 2 INJECTION, SOLUTION INTRAMUSCULAR; INTRAVENOUS at 03:04

## 2020-04-05 NOTE — PROGRESS NOTES
[facility] Medicine  Progress Note    Patient Name: Mukesh Addison  MRN: [id number]  Patient Class: IP- Inpatient   Admission Date: 3/19/2020  Length of Stay: 16 days  Attending Physician: Landen Lofton MD  Primary Care Provider: Primary Doctor No        Subjective:     Principal Problem:Acute hypoxemic respiratory failure        HPI:  Mr Addison is a 70 yo CM pt who resides in [facility]. He was sent here from there for possible aspiration pneumonia, on xray R>L. They had noted that his temperature spiked at 103 degrees. On arrival here it was 102.2 and now 100.9. He is experiencing tachypnea ( respiratory rate 35 with no hypoxia) and is flushed in appearance. He is nonverbal and cannot provide any history. Has no family listed on his demographics. Pt has a history of a CVA and Parkinson's making him non verbal. He also has contractures to his upper and lower extremities. Respirations with some wheezing. Other history from past records GERD and HTN.    Overview/Hospital Course:  03/31 Unresponsive.Waiting for acceptance back to NH with hospice.    04/01 Unresponsive. Waiting for acceptance back to NH. Continues with pyrexia.    04/02 Unresponsive. Waiting for acceptance back to NH. Continues with pyrexia.    04/03 Unresponsive. Waiting for acceptance back to NH. Pyrexia resolved; not on antipyretics.    04/04 Unresponsive. Waiting for acceptance back to NH. Pyrexia resolved; not on antipyretics.    04/05     Interval History: covid    Review of Systems   Reason unable to perform ROS: unresponsive.     Objective:     Vital Signs (Most Recent):  Temp: 97.8 °F (36.6 °C) (04/05/20 0820)  Pulse: (!) 55 (04/05/20 0950)  Resp: 14 (04/05/20 0950)  BP: 127/71 (04/05/20 0820)  SpO2: 98 % (04/05/20 0950) Vital Signs (24h Range):  Temp:  [97.8 °F (36.6 °C)-98.2 °F (36.8 °C)] 97.8 °F (36.6 °C)  Pulse:  [53-56] 55  Resp:  [14-19] 14  SpO2:  [98 %-99 %] 98 %  BP: (122-127)/(71) 127/71     Weight: 70.2 kg  (154 lb 12.2 oz)  Body mass index is 25.75 kg/m².    Intake/Output Summary (Last 24 hours) at 4/5/2020 1321  Last data filed at 4/5/2020 1000  Gross per 24 hour   Intake --   Output 650 ml   Net -650 ml      Physical Exam   Constitutional: He appears well-developed and well-nourished.   Chronically ill apearing   HENT:   Head: Normocephalic and atraumatic.   Eyes: Pupils are equal, round, and reactive to light. Conjunctivae and EOM are normal.   Neck: Normal range of motion. Neck supple.   Cardiovascular: Normal rate, regular rhythm, normal heart sounds and intact distal pulses.   Pulmonary/Chest:   Decreased entry without adventitious sounds   Abdominal: Soft. Bowel sounds are normal.   Musculoskeletal: Normal range of motion.   Neurological:   unresponsive   Skin: Skin is warm and dry. Capillary refill takes less than 2 seconds.   Psychiatric: He has a normal mood and affect. His behavior is normal. Judgment and thought content normal.   Nursing note and vitals reviewed.      Significant Labs: None    Significant Imaging: I have reviewed and interpreted all pertinent imaging results/findings within the past 24 hours.      Assessment/Plan:      COVID-19 virus detected    Continue current regimen; transfer to NH with hospice when apyrexic X > 3 days off of antipyrexics    Anemia  Continue current regimen      Gait instability    Continue current regimen    CVA (cerebral vascular accident)  Continue current regimen; DNR      Pharyngeal dysphagia    Continue current regimen    Pneumonia of both lungs due to infectious organism  Continue current regimen      HTN (hypertension)  Continue current regimen      Aspiration pneumonia  Continue current regimen        VTE Risk Mitigation (From admission, onward)         Ordered     IP VTE LOW RISK PATIENT  Once      03/20/20 0247     Place sequential compression device  Until discontinued      03/20/20 0247                      Landen Lofton MD  Department of Hospital  Medicine   [facility]

## 2020-04-05 NOTE — SUBJECTIVE & OBJECTIVE
Interval History: covid    Review of Systems   Reason unable to perform ROS: unresponsive.     Objective:     Vital Signs (Most Recent):  Temp: 97.8 °F (36.6 °C) (04/05/20 0820)  Pulse: (!) 55 (04/05/20 0950)  Resp: 14 (04/05/20 0950)  BP: 127/71 (04/05/20 0820)  SpO2: 98 % (04/05/20 0950) Vital Signs (24h Range):  Temp:  [97.8 °F (36.6 °C)-98.2 °F (36.8 °C)] 97.8 °F (36.6 °C)  Pulse:  [53-56] 55  Resp:  [14-19] 14  SpO2:  [98 %-99 %] 98 %  BP: (122-127)/(71) 127/71     Weight: 70.2 kg (154 lb 12.2 oz)  Body mass index is 25.75 kg/m².    Intake/Output Summary (Last 24 hours) at 4/5/2020 1321  Last data filed at 4/5/2020 1000  Gross per 24 hour   Intake --   Output 650 ml   Net -650 ml      Physical Exam   Constitutional: He appears well-developed and well-nourished.   Chronically ill apearing   HENT:   Head: Normocephalic and atraumatic.   Eyes: Pupils are equal, round, and reactive to light. Conjunctivae and EOM are normal.   Neck: Normal range of motion. Neck supple.   Cardiovascular: Normal rate, regular rhythm, normal heart sounds and intact distal pulses.   Pulmonary/Chest:   Decreased entry without adventitious sounds   Abdominal: Soft. Bowel sounds are normal.   Musculoskeletal: Normal range of motion.   Neurological:   unresponsive   Skin: Skin is warm and dry. Capillary refill takes less than 2 seconds.   Psychiatric: He has a normal mood and affect. His behavior is normal. Judgment and thought content normal.   Nursing note and vitals reviewed.      Significant Labs: None    Significant Imaging: I have reviewed and interpreted all pertinent imaging results/findings within the past 24 hours.

## 2020-04-06 LAB
ANION GAP SERPL CALC-SCNC: 11 MMOL/L (ref 8–16)
BASOPHILS # BLD AUTO: 0.02 K/UL (ref 0–0.2)
BASOPHILS NFR BLD: 0.3 % (ref 0–1.9)
BUN SERPL-MCNC: 23 MG/DL (ref 8–23)
CALCIUM SERPL-MCNC: 9.4 MG/DL (ref 8.7–10.5)
CHLORIDE SERPL-SCNC: 112 MMOL/L (ref 95–110)
CO2 SERPL-SCNC: 26 MMOL/L (ref 23–29)
CREAT SERPL-MCNC: 0.6 MG/DL (ref 0.5–1.4)
DIFFERENTIAL METHOD: ABNORMAL
EOSINOPHIL # BLD AUTO: 0.1 K/UL (ref 0–0.5)
EOSINOPHIL NFR BLD: 1.5 % (ref 0–8)
ERYTHROCYTE [DISTWIDTH] IN BLOOD BY AUTOMATED COUNT: 12.5 % (ref 11.5–14.5)
EST. GFR  (AFRICAN AMERICAN): >60 ML/MIN/1.73 M^2
EST. GFR  (NON AFRICAN AMERICAN): >60 ML/MIN/1.73 M^2
GLUCOSE SERPL-MCNC: 89 MG/DL (ref 70–110)
HCT VFR BLD AUTO: 36.3 % (ref 40–54)
HGB BLD-MCNC: 11.5 G/DL (ref 14–18)
IMM GRANULOCYTES # BLD AUTO: 0.01 K/UL (ref 0–0.04)
IMM GRANULOCYTES NFR BLD AUTO: 0.1 % (ref 0–0.5)
LYMPHOCYTES # BLD AUTO: 1.3 K/UL (ref 1–4.8)
LYMPHOCYTES NFR BLD: 18.3 % (ref 18–48)
MCH RBC QN AUTO: 31.3 PG (ref 27–31)
MCHC RBC AUTO-ENTMCNC: 31.7 G/DL (ref 32–36)
MCV RBC AUTO: 99 FL (ref 82–98)
MONOCYTES # BLD AUTO: 0.5 K/UL (ref 0.3–1)
MONOCYTES NFR BLD: 7.4 % (ref 4–15)
NEUTROPHILS # BLD AUTO: 5.3 K/UL (ref 1.8–7.7)
NEUTROPHILS NFR BLD: 72.4 % (ref 38–73)
NRBC BLD-RTO: 0 /100 WBC
PLATELET # BLD AUTO: 263 K/UL (ref 150–350)
PMV BLD AUTO: 11 FL (ref 9.2–12.9)
POTASSIUM SERPL-SCNC: 3.6 MMOL/L (ref 3.5–5.1)
RBC # BLD AUTO: 3.68 M/UL (ref 4.6–6.2)
SODIUM SERPL-SCNC: 149 MMOL/L (ref 136–145)
WBC # BLD AUTO: 7.33 K/UL (ref 3.9–12.7)

## 2020-04-06 PROCEDURE — 27000221 HC OXYGEN, UP TO 24 HOURS

## 2020-04-06 PROCEDURE — 85025 COMPLETE CBC W/AUTO DIFF WBC: CPT

## 2020-04-06 PROCEDURE — 63600175 PHARM REV CODE 636 W HCPCS: Performed by: INTERNAL MEDICINE

## 2020-04-06 PROCEDURE — 99900035 HC TECH TIME PER 15 MIN (STAT)

## 2020-04-06 PROCEDURE — 21400001 HC TELEMETRY ROOM

## 2020-04-06 PROCEDURE — 80048 BASIC METABOLIC PNL TOTAL CA: CPT

## 2020-04-06 PROCEDURE — 94761 N-INVAS EAR/PLS OXIMETRY MLT: CPT

## 2020-04-06 PROCEDURE — 36415 COLL VENOUS BLD VENIPUNCTURE: CPT

## 2020-04-06 RX ADMIN — LORAZEPAM 2 MG: 2 INJECTION INTRAMUSCULAR; INTRAVENOUS at 12:04

## 2020-04-06 RX ADMIN — SODIUM CHLORIDE: 0.9 INJECTION, SOLUTION INTRAVENOUS at 12:04

## 2020-04-06 NOTE — PLAN OF CARE
04/06/20 1548   Discharge Assessment   Assessment Type Discharge Planning Reassessment     Earlier this am, Mrs Holliday emailed this CM around 815am asking which of their Resident patients are ready to Return Back to , made a list after talking with MDs and emailed back to Mrs Holliday at  this am.     Built and sent referral to Return  Resident back to  at fax [phone] and emailed Mrs Holliday that referral sent, and callback number on referral. [facility] will restart with Hospice care for pt, CM will follow for DC Planning Needs.

## 2020-04-06 NOTE — PROGRESS NOTES
[facility] Medicine  Progress Note    Patient Name: Mukesh Addison  MRN: [id number]  Patient Class: IP- Inpatient   Admission Date: 3/19/2020  Length of Stay: 17 days  Attending Physician: Landen Lofton MD  Primary Care Provider: Primary Doctor No        Subjective:     Principal Problem:Acute hypoxemic respiratory failure        HPI:  Mr Addison is a 68 yo CM pt who resides in [facility]. He was sent here from there for possible aspiration pneumonia, on xray R>L. They had noted that his temperature spiked at 103 degrees. On arrival here it was 102.2 and now 100.9. He is experiencing tachypnea ( respiratory rate 35 with no hypoxia) and is flushed in appearance. He is nonverbal and cannot provide any history. Has no family listed on his demographics. Pt has a history of a CVA and Parkinson's making him non verbal. He also has contractures to his upper and lower extremities. Respirations with some wheezing. Other history from past records GERD and HTN.    Overview/Hospital Course:  03/31 Unresponsive.Waiting for acceptance back to NH with hospice.    04/01 Unresponsive. Waiting for acceptance back to NH. Continues with pyrexia.    04/02 Unresponsive. Waiting for acceptance back to NH. Continues with pyrexia.    04/03 Unresponsive. Waiting for acceptance back to NH. Pyrexia resolved; not on antipyretics.    04/04 Unresponsive. Waiting for acceptance back to NH. Pyrexia resolved; not on antipyretics.    04/05 Unresponsive. Waiting for acceptance back to NH. Pyrexia resolved; not on antipyretics.       04/06 Unresponsive. Waiting for acceptance back to NH. Pyrexia resolved; not on antipyretics.      Interval History: stable    Review of Systems   Reason unable to perform ROS: Unresponsive.     Objective:     Vital Signs (Most Recent):  Temp: 98 °F (36.7 °C) (04/05/20 1942)  Pulse: (!) 55 (04/06/20 0816)  Resp: 18 (04/06/20 0816)  BP: (!) 149/67 (04/05/20 1942)  SpO2: 98 % (04/06/20 0816) Vital  Signs (24h Range):  Temp:  [97.6 °F (36.4 °C)-98 °F (36.7 °C)] 98 °F (36.7 °C)  Pulse:  [47-55] 55  Resp:  [18-20] 18  SpO2:  [98 %-100 %] 98 %  BP: (135-149)/(67-71) 149/67     Weight: 70.2 kg (154 lb 12.2 oz)  Body mass index is 25.75 kg/m².    Intake/Output Summary (Last 24 hours) at 4/6/2020 1142  Last data filed at 4/6/2020 0600  Gross per 24 hour   Intake 0 ml   Output 865 ml   Net -865 ml      Physical Exam   Constitutional: He appears well-developed and well-nourished.   Chronically ill appearing   HENT:   Head: Normocephalic and atraumatic.   Eyes: Pupils are equal, round, and reactive to light.   Neck: Neck supple.   Cardiovascular: Normal rate, regular rhythm, normal heart sounds and intact distal pulses.   Pulmonary/Chest:   Decreased entry without adventitious sounds   Abdominal: Soft. Bowel sounds are normal.   Musculoskeletal:   Unable to fully assess   Neurological:   Unable to fully assess   Skin: Skin is warm and dry. Capillary refill takes less than 2 seconds.   Psychiatric:   Unable to fully assess   Nursing note and vitals reviewed.      Significant Labs:   BMP:   Recent Labs   Lab 04/06/20  0751   GLU 89   *   K 3.6   *   CO2 26   BUN 23   CREATININE 0.6   CALCIUM 9.4     CBC:   Recent Labs   Lab 04/06/20  0751   WBC 7.33   HGB 11.5*   HCT 36.3*          Significant Imaging: I have reviewed and interpreted all pertinent imaging results/findings within the past 24 hours.      Assessment/Plan:      COVID-19 virus detected    Continue current regimen; transfer to NH with hospice when apyrexic X > 3 days off of antipyrexics    Anemia  Continue current regimen      Gait instability    Continue current regimen    CVA (cerebral vascular accident)  Continue current regimen; DNR      Pharyngeal dysphagia    Continue current regimen    Pneumonia of both lungs due to infectious organism  Continue current regimen      HTN (hypertension)  Continue current regimen      Aspiration  pneumonia  Continue current regimen        VTE Risk Mitigation (From admission, onward)         Ordered     IP VTE LOW RISK PATIENT  Once      03/20/20 0247     Place sequential compression device  Until discontinued      03/20/20 0247                      Landen Lofton MD  Department of Hospital Medicine   [facility]

## 2020-04-07 ENCOUNTER — PATIENT OUTREACH (OUTPATIENT)
Dept: ORTHOPEDICS | Facility: CLINIC | Age: 70
End: 2020-04-07

## 2020-04-07 ENCOUNTER — PATIENT OUTREACH (OUTPATIENT)
Dept: FAMILY MEDICINE | Facility: CLINIC | Age: 70
End: 2020-04-07

## 2020-04-07 VITALS
TEMPERATURE: 98 F | SYSTOLIC BLOOD PRESSURE: 137 MMHG | HEART RATE: 55 BPM | HEIGHT: 65 IN | RESPIRATION RATE: 20 BRPM | WEIGHT: 154.75 LBS | BODY MASS INDEX: 25.78 KG/M2 | DIASTOLIC BLOOD PRESSURE: 55 MMHG | OXYGEN SATURATION: 99 %

## 2020-04-07 PROCEDURE — 94761 N-INVAS EAR/PLS OXIMETRY MLT: CPT

## 2020-04-07 PROCEDURE — 27000221 HC OXYGEN, UP TO 24 HOURS

## 2020-04-07 NOTE — PLAN OF CARE
"   04/07/20 1638   Discharge Assessment   Assessment Type Discharge Planning Reassessment     About 1315 pm Mrs Holliday from [facility] called this CM from [phone] that they are ready to accept Mr Addison back to  and to start Hospice Care at . Secure chatted Dr Tadeo and updated and she put DC Orders in. Built and sent DC Referral to  at fax [phone] and emailed Mrs Holliday that DC order referral was sent, and Mrs Holliday at  has this CM callback number.     Called Mrs De Anda with [facility] at [phone] and updated status, called [facility] for transport as pt bedbound, contracted upper and lower extremities, on 2L/NC, COVID+, and [facility] good with transport per COVID + saying "what other way would they get there." Mrs Holliday emailed this CM to call report to [place] [address], pt nurse Mrs Ruvalcaba updated to all.    1620 pm: [facility] called this CM that they are delayed with  Emergency cases and should be here in about an hour to transport, updated pt nurse Mrs Ruvalcaba, emailed Mrs Holliday at  and called Mrs De Anda with [facility] and updated all.  CM wioll follow for DC Planning Needs.  "

## 2020-04-07 NOTE — DISCHARGE SUMMARY
[facility]  Discharge Summary      Patient Name: Mukesh Addison  MRN: [id number]  Admission Date: 3/19/2020  Hospital Length of Stay: 18 days  Discharge Date and Time: No discharge date for patient encounter.  Attending Physician: Vianey Fischer DO   Discharging Provider: Vianey Fischer DO  Primary Care Provider: Primary Doctor No      HPI per NP Alyea on 3/19/2020  Mr Addison is a 70 yo CM pt who resides in [facility]. He was sent here from there for possible aspiration pneumonia, on xray R>L. They had noted that his temperature spiked at 103 degrees. On arrival here it was 102.2 and now 100.9. He is experiencing tachypnea ( respiratory rate 35 with no hypoxia) and is flushed in appearance. He is nonverbal and cannot provide any history. Has no family listed on his demographics. Pt has a history of a CVA and Parkinson's making him non verbal. He also has contractures to his upper and lower extremities. Respirations with some wheezing. Other history from past records GERD and HTN.    * No surgery found *      Hospital Course:   70 yo M with pmh of parkinson's dx, CVa with residual deficits, non-verbal at baseline admitted for acute respiratory failure from suspected aspiration pna and suspected covid infection.  Antibiotic therapy including Rocephin and azithromycin well as hydroxychloroquine for suspected COVID 90 infection.  COVID labs resulted positive.  Patient was initially maintaining good O2 saturations nasal however early morning 3/22 patient became acutely initiation of mechanical ventilation.  Antibiotic coverage was broadened to Zosyn.  Pulmonary and Infectious Disease were consulted.  Patient was successfully Extubated on 3/24.  Patient with history of severe dysphagia.  Discussed with family refused PEG tube placement and TPN started.  3/27 decision by family to transition patient to hospice and patient placed on comfort care measures.  Patient remained stable while  "waiting nursing home with hospice placement.  Patient was discharged on 04/07/2020 to nursing home with hospice.  BP (!) 137/55   Pulse (!) 55   Temp 97.9 °F (36.6 °C) (Axillary)   Resp 20   Ht 5' 5" (1.651 m)   Wt 70.2 kg (154 lb 12.2 oz)   SpO2 99%   BMI 25.75 kg/m²   Gen: nad  Heent; ncat  CV: rrr no mrg  Resp: decreased at bases  AB: +bs soft ntnd  Skin: dry, war    Consults:   Consults (From admission, onward)        Status Ordering Provider     Inpatient consult to Gastroenterology  Once     Provider:  Kevin Staton III, MD    Completed YASH SOTELO     Inpatient consult to Infectious Diseases  Once     Provider:  Marley Dumont MD    Completed MASSIEL STERN     Inpatient consult to PICC team ([facility])  Once     Provider:  (Not yet assigned)    Completed ELYSIA TAYLOR     Inpatient consult to Pulmonology  Once     Provider:  Elysia Taylor MD    Completed ROMMEL BATISTA     Inpatient consult to Registered Dietitian/Nutritionist  Once     Provider:  (Not yet assigned)    Completed MASSIEL STERN     Inpatient consult to Registered Dietitian/Nutritionist  Once     Provider:  (Not yet assigned)    Completed PAOLA VIRK     Inpatient consult to Registered Dietitian/Nutritionist  Once     Provider:  (Not yet assigned)    Completed ROMMEL BATISTA     Inpatient consult to Registered Dietitian/Nutritionist  Once     Provider:  (Not yet assigned)    Completed YASH SOTELO     Inpatient consult to Social Work/Case Management  Once     Provider:  (Not yet assigned)    Acknowledged ELYSIA TAYLOR     IP consult to case management/social wor  Once     Provider:  (Not yet assigned)    Completed MASSIEL STERN          No new Assessment & Plan notes have been filed under this hospital service since the last note was generated.  Service: Hospital Medicine    Final Active Diagnoses:    Diagnosis Date Noted POA    PRINCIPAL PROBLEM:  COVID-19 virus detected [U07.1] " 04/03/2020 Yes    Anemia [D64.9] 03/22/2020 Yes    Pneumonia of both lungs due to infectious organism [J18.9] 03/20/2020 Yes    Pharyngeal dysphagia [R13.13] 03/20/2020 Yes    CVA (cerebral vascular accident) [I63.9] 03/20/2020 Yes     Chronic    Gait instability [R26.81] 03/20/2020 Yes    Aspiration pneumonia [J69.0] 03/19/2020 Yes    HTN (hypertension) [I10] 03/19/2020 Yes      Problems Resolved During this Admission:    Diagnosis Date Noted Date Resolved POA    Hypophosphatemia [E83.39] 03/25/2020 03/27/2020 No    Acute hypoxemic respiratory failure [J96.01] 03/22/2020 03/27/2020 Yes    Hypokalemia [E87.6] 03/22/2020 03/27/2020 No    Suspected Covid-19 Virus Infection [R68.89] 03/19/2020 04/05/2020 Yes       Discharged Condition: stable    Disposition: Nursing Facility with hospice    Follow Up:  Follow-up Information     Primary Doctor No.           [facility].    Specialty:  Emergency Medicine  Why:  As needed  Contact information:  [address] [facility] [address] [phone]  Additional information:  1st floor               Patient Instructions:      Npo comfort per hospice   Order Comments: Diet as per comfort now that he has been transitioned to hospice     Notify your health care provider if you experience any of the following:  temperature >100.4     Notify your health care provider if you experience any of the following:  persistent nausea and vomiting or diarrhea     Notify your health care provider if you experience any of the following:  persistent dizziness, light-headedness, or visual disturbances     Activity as tolerated   Order Comments: Instructions for Patients Awaiting COVID-19 Test Results    You will either be called with your test result or it will be released to the patient portal.  If you have any questions about your test, please visit [email or web address] or call our COVID-19 information line at [phone].    Prevention steps for  "patients with confirmed or suspected COVID-19    Stay home except to get medical care.  Separate yourself from other people and animals in your home  Call ahead before visiting your doctor.  Wear a facemask.  Cover your coughs and sneezes.  Clean your hands often.  Avoid sharing personal household items.  Clean all "high-touch" surfaces every day.  Monitor your symptoms. Seek prompt medical attention if your illness is worsening (e.g., difficulty breathing). Before seeking care, call your healthcare provider.  If you have a medical emergency and need to call 911, notify the dispatch personnel that you have, or are being evaluated for COVID-19. If possible, put on a facemask before emergency medical services arrive.  Discontinuing home isolation. Call your provider about guidance to discontinue home isolation.    Recommended precautions for household members, intimate partners, and caregivers in a nonhealthcare setting of a patient with symptomatic laboratory-confirmed COVID-19 or a patient under investigation.  Household members, intimate partners, and caregivers in a nonhealthcare setting may have close contact with a person with symptomatic, laboratory-confirmed COVID-19 or a person under investigation. Close contacts should monitor their health; they should call their healthcare provider right away if they develop symptoms suggestive of COVID-19 (e.g., fever, cough, shortness of breath).    Close contacts should also follow these recommendations:  Make sure that you understand and can help the patient follow their healthcare provider's instructions for medication(s) and care. You should help the patient with basic needs in the home and provide support for getting groceries, prescriptions, and other personal needs.  Monitor the patient's symptoms. If the patient is getting sicker, call his or her healthcare provider and tell them that the patient has laboratory-confirmed COVID-19. This will help the healthcare " provider's office take steps to keep other people in the office or waiting room from getting infected. Ask the healthcare provider to call the local or state health department for additional guidance. If the patient has a medical emergency and you need to call 911, notify the dispatch personnel that the patient has, or is being evaluated for COVID-19.  Household members should stay in another room or be  from the patient as much as possible. Household members should use a separate bedroom and bathroom, if available.  Prohibit visitors who do not have an essential need to be in the home.  Household members should care for any pets in the home. Do not handle pets or other animals while sick.  Make sure that shared spaces in the home have good air flow, such as by an air conditioner or an opened window, weather permitting.  Perform hand hygiene frequently. Wash your hands often with soap and water for at least 20 seconds or use an alcohol-based hand  that contains 60 to 95% alcohol, covering all surfaces of your hands and rubbing them together until they feel dry. Soap and water should be used preferentially if hands are visibly dirty.  Avoid touching your eyes, nose, and mouth with unwashed hands.  The patient should wear a facemask when you are around other people. If the patient is not able to wear a facemask (for example, because it causes trouble breathing), you, as the caregiver should wear a mask when you are in the same room as the patient.  Wear a disposable facemask and gloves when you touch or have contact with the patient's blood, stool, or body fluids, such as saliva, sputum, nasal mucus, vomit, urine.  Throw out disposable facemasks and gloves after using them. Do not reuse.  When removing personal protective equipment, first remove and dispose of gloves. Then, immediately clean your hands with soap and water or alcohol-based hand . Next, remove and dispose of facemask, and  "immediately clean your hands again with soap and water or alcohol-based hand .  Avoid sharing household items with the patient. You should not share dishes, drinking glasses, cups, eating utensils, towels, bedding, or other items. After the patient uses these items, you should wash them thoroughly (see below "Wash laundry thoroughly").  Clean all "high-touch" surfaces, such as counters, tabletops, doorknobs, bathroom fixtures, toilets, phones, keyboards, tablets, and bedside tables, every day. Also, clean any surfaces that may have blood, stool, or body fluids on them.  Use a household cleaning spray or wipe, according to the label instructions. Labels contain instructions for safe and effective use of the cleaning product including precautions you should take when applying the product, such as wearing gloves and making sure you have good ventilation during use of the product.  Wash laundry thoroughly.  Immediately remove and wash clothes or bedding that have blood, stool, or body fluids on them.  Wear disposable gloves while handling soiled items and keep soiled items away from your body. Clean your hands (with soap and water or an alcohol-based hand ) immediately after removing your gloves.  Read and follow directions on labels of laundry or clothing items and detergent. In general, using a normal laundry detergent according to washing machine instructions and dry thoroughly using the warmest temperatures recommended on the clothing label.  Place all used disposable gloves, facemasks, and other contaminated items in a lined container before disposing of them with other household waste. Clean your hands (with soap and water or an alcohol-based hand ) immediately after handling these items. Soap and water should be used preferentially if hands are visibly dirty.  Discuss any additional questions with your state or local health department or healthcare provider. Check available hours when " contacting your local health department.    For more information see CDC link below.      https://www.cdc.gov/coronavirus/2019-ncov/hcp/guidance-prevent-spread.html#precautions        Sources:  [facility], [place] Department of Health and [facility]     Activity as tolerated       Significant Diagnostic Studies: Labs:   CMP   Recent Labs   Lab 04/06/20  0751   *   K 3.6   *   CO2 26   GLU 89   BUN 23   CREATININE 0.6   CALCIUM 9.4   ANIONGAP 11   ESTGFRAFRICA >60.0   EGFRNONAA >60.0   , CBC   Recent Labs   Lab 04/06/20  0751   WBC 7.33   HGB 11.5*   HCT 36.3*         covid positive 3/19/20    and All labs within the past 24 hours have been reviewed    Pending Diagnostic Studies:     Procedure Component Value Units Date/Time    Basic Metabolic Panel (BMP) [[id number]] Collected:  03/23/20 0419    Order Status:  Sent Lab Status:  In process Updated:  03/23/20 0447    Specimen:  Blood     Narrative:       Collection has been rescheduled by DM5 at 03/23/2020 04:01 Reason:   line    Basic metabolic panel [[id number]] Collected:  04/06/20 0532    Order Status:  Sent Lab Status:  In process Updated:  04/06/20 0532    Specimen:  Blood     C-Reactive Protein [[id number]] Collected:  03/23/20 0419    Order Status:  Sent Lab Status:  In process Updated:  03/23/20 0447    Specimen:  Blood     Narrative:       Collection has been rescheduled by DM5 at 03/23/2020 04:01 Reason:   line    CBC auto differential [[id number]] Collected:  04/06/20 0532    Order Status:  Sent Lab Status:  In process Updated:  04/06/20 0532    Specimen:  Blood     Magnesium [[id number]] Collected:  03/23/20 0419    Order Status:  Sent Lab Status:  In process Updated:  03/23/20 0447    Specimen:  Blood     Narrative:       Collection has been rescheduled by DM5 at 03/23/2020 04:01 Reason:   line    Phosphorus [[id number]] Collected:  03/23/20 0419    Order Status:  Sent Lab Status:  In process Updated:  03/23/20 0447    Specimen:  Blood      Narrative:       Collection has been rescheduled by DM5 at 03/23/2020 04:01 Reason:   line         Medications:  Reconciled Home Medications:      Medication List      CHANGE how you take these medications    carbidopa-levodopa  mg  mg Tbsr  Commonly known as:  SINEMET CR  Take 2 tablets by mouth 3 (three) times daily.  What changed:  Another medication with the same name was removed. Continue taking this medication, and follow the directions you see here.        CONTINUE taking these medications    acetaminophen 650 MG Tbsr  Commonly known as:  TYLENOL  Take 650 mg by mouth every 6 (six) hours as needed.     ARTIFICIAL TEAR SOLUTION OPHT  Place 2 drops into both eyes 2 (two) times daily.     BABY SHAMPOO TOP  Apply 1 application topically 2 (two) times daily.     ketoconazole 2 % shampoo  Commonly known as:  NIZORAL  Apply 1 application topically every Mon, Wed, Fri.     lisinopriL 10 MG tablet  Take 10 mg by mouth every evening.     mirtazapine 15 MG tablet  Commonly known as:  REMERON  Take 15 mg by mouth every evening.     multivitamin per tablet  Commonly known as:  THERAGRAN  Take 1 tablet by mouth once daily.     NUEDEXTA 20-10 mg per capsule  Generic drug:  dextromethorphan-quinidine 20-10 mg  Take 1 capsule by mouth 2 (two) times daily.     triamcinolone acetonide 0.025% 0.025 % cream  Commonly known as:  KENALOG  Apply 1 application topically 2 (two) times daily.     vitamin D 1000 units Tab  Commonly known as:  VITAMIN D3  Take 1,000 Units by mouth once daily.        STOP taking these medications    aspirin 81 MG EC tablet  Commonly known as:  ECOTRIN            Indwelling Lines/Drains at time of discharge:   Lines/Drains/Airways            Drain                 Urethral Catheter 03/19/20 2102 Straight-tip 16 Fr. 18 days                Time spent on the discharge of patient: 34 minutes  Patient was seen and examined on the date of discharge and determined to be suitable for discharge.          Vianey Fischer DO  Department of Hospital Medicine  [facility]

## 2020-04-14 NOTE — PROGRESS NOTES
I attempted to reach out to this patient in several times this is my 2nd time in 1 week and I have been unable to establish contact with this patient in reference to patient not reach for his positive COVID-19 results.  I will close this encounter.